# Patient Record
Sex: FEMALE | Race: WHITE | NOT HISPANIC OR LATINO | Employment: FULL TIME | URBAN - METROPOLITAN AREA
[De-identification: names, ages, dates, MRNs, and addresses within clinical notes are randomized per-mention and may not be internally consistent; named-entity substitution may affect disease eponyms.]

---

## 2019-04-11 ENCOUNTER — OFFICE VISIT (OUTPATIENT)
Dept: ORTHOPEDICS | Facility: CLINIC | Age: 49
End: 2019-04-11
Payer: MEDICAID

## 2019-04-11 VITALS — BODY MASS INDEX: 34.57 KG/M2 | TEMPERATURE: 99 F | HEIGHT: 63 IN | WEIGHT: 195.13 LBS

## 2019-04-11 DIAGNOSIS — S52.351A CLOSED DISPLACED COMMINUTED FRACTURE OF SHAFT OF RIGHT RADIUS, INITIAL ENCOUNTER: Primary | ICD-10-CM

## 2019-04-11 PROCEDURE — 99202 PR OFFICE/OUTPT VISIT, NEW, LEVL II, 15-29 MIN: ICD-10-PCS | Mod: S$GLB,,, | Performed by: ORTHOPAEDIC SURGERY

## 2019-04-11 PROCEDURE — 73090 PR  X-RAY FOREARM 2 VW: ICD-10-PCS | Mod: TC,RT,S$GLB, | Performed by: ORTHOPAEDIC SURGERY

## 2019-04-11 PROCEDURE — 73090 X-RAY EXAM OF FOREARM: CPT | Mod: TC,RT,S$GLB, | Performed by: ORTHOPAEDIC SURGERY

## 2019-04-11 PROCEDURE — 99202 OFFICE O/P NEW SF 15 MIN: CPT | Mod: S$GLB,,, | Performed by: ORTHOPAEDIC SURGERY

## 2019-04-11 RX ORDER — HYDROCODONE BITARTRATE AND ACETAMINOPHEN 10; 325 MG/1; MG/1
TABLET ORAL
COMMUNITY
Start: 2019-04-02 | End: 2021-01-28

## 2019-04-11 RX ORDER — GABAPENTIN 300 MG/1
300 CAPSULE ORAL 3 TIMES DAILY
COMMUNITY
Start: 2019-01-09 | End: 2023-07-05

## 2019-04-11 RX ORDER — TIZANIDINE 4 MG/1
TABLET ORAL
COMMUNITY
Start: 2019-01-10 | End: 2021-01-28

## 2019-04-11 RX ORDER — ESCITALOPRAM OXALATE 5 MG/1
TABLET ORAL
COMMUNITY
Start: 2019-01-09 | End: 2021-01-28

## 2019-04-11 RX ORDER — PROMETHAZINE HYDROCHLORIDE 6.25 MG/5ML
SYRUP ORAL
COMMUNITY
Start: 2019-03-28 | End: 2021-01-28

## 2019-04-11 RX ORDER — IBUPROFEN 800 MG/1
TABLET ORAL
COMMUNITY
Start: 2019-01-10 | End: 2021-01-28

## 2019-04-11 RX ORDER — DOXYCYCLINE 100 MG/1
CAPSULE ORAL
COMMUNITY
Start: 2019-03-28 | End: 2021-01-28

## 2019-04-11 NOTE — PROGRESS NOTES
Subjective:      Patient ID: Cynthia Raymond is a 48 y.o. female.    Chief Complaint: Arm Injury (rt)    HPI patient was thrown from a horse a week ago.  The horse then stepped on her right forearm.  Outside x-rays show a displaced midshaft right radius fracture    Review of Systems   Constitution: Negative for fever and weight loss.   Cardiovascular: Negative for chest pain and leg swelling.   Musculoskeletal: Positive for stiffness. Negative for arthritis, joint pain, joint swelling and muscle weakness.   Gastrointestinal: Negative for change in bowel habit.   Genitourinary: Negative for bladder incontinence and hematuria.   Neurological: Negative for focal weakness, numbness, paresthesias and sensory change.         Objective:            Ortho/SPM Exam            Assessment:       Encounter Diagnosis   Name Primary?    Closed displaced comminuted fracture of shaft of right radius, initial encounter Yes          Plan:       Cynthia was seen today for arm injury.    Diagnoses and all orders for this visit:    Closed displaced comminuted fracture of shaft of right radius, initial encounter     Repeat x-rays today show the fracture has displaced from the initial films which were taken outside.  Recommend open reduction internal fixation.  Complications alternatives wrist indications and benefits were discussed with the patient and she agrees to proceed

## 2019-04-17 ENCOUNTER — OUTSIDE PLACE OF SERVICE (OUTPATIENT)
Dept: ADMINISTRATIVE | Facility: OTHER | Age: 49
End: 2019-04-17

## 2019-04-22 RX ORDER — METHYLPREDNISOLONE 4 MG/1
TABLET ORAL
COMMUNITY
Start: 2019-03-28 | End: 2021-01-28

## 2019-04-23 ENCOUNTER — OFFICE VISIT (OUTPATIENT)
Dept: ORTHOPEDICS | Facility: CLINIC | Age: 49
End: 2019-04-23
Payer: MEDICAID

## 2019-04-23 DIAGNOSIS — S52.351A CLOSED DISPLACED COMMINUTED FRACTURE OF SHAFT OF RIGHT RADIUS, INITIAL ENCOUNTER: Primary | ICD-10-CM

## 2019-04-23 PROCEDURE — 73090 X-RAY EXAM OF FOREARM: CPT | Mod: TC,RT,S$GLB, | Performed by: ORTHOPAEDIC SURGERY

## 2019-04-23 PROCEDURE — 99024 POSTOP FOLLOW-UP VISIT: CPT | Mod: S$GLB,,, | Performed by: ORTHOPAEDIC SURGERY

## 2019-04-23 PROCEDURE — 99024 PR POST-OP FOLLOW-UP VISIT: ICD-10-PCS | Mod: S$GLB,,, | Performed by: ORTHOPAEDIC SURGERY

## 2019-04-23 PROCEDURE — 73090 PR  X-RAY FOREARM 2 VW: ICD-10-PCS | Mod: TC,RT,S$GLB, | Performed by: ORTHOPAEDIC SURGERY

## 2019-04-23 RX ORDER — OXYCODONE AND ACETAMINOPHEN 10; 325 MG/1; MG/1
TABLET ORAL
COMMUNITY
Start: 2019-04-17 | End: 2019-04-30 | Stop reason: DRUGHIGH

## 2019-04-23 NOTE — PROGRESS NOTES
Subjective:      Patient ID: Cynthia Raymond is a 48 y.o. female.    Chief Complaint: Post-op Evaluation of the Right Forearm    HPI patient is 1 week postop ORIF of her right radial shaft fracture  ROS    unchanged from prior visit  Objective:            Ortho/SPM Exam    The incision is clean.  There is minimal swelling. She has no drainage.  Active and passive range of motion of the elbow and wrist is normal        Assessment:       Encounter Diagnosis   Name Primary?    Closed displaced comminuted fracture of shaft of right radius, initial encounter Yes          Plan:       Cynthia was seen today for post-op evaluation.    Diagnoses and all orders for this visit:    Closed displaced comminuted fracture of shaft of right radius, initial encounter     X-ray shows anatomic reduction of the fracture with hardware in good position

## 2019-04-30 ENCOUNTER — OFFICE VISIT (OUTPATIENT)
Dept: ORTHOPEDICS | Facility: CLINIC | Age: 49
End: 2019-04-30
Payer: MEDICAID

## 2019-04-30 DIAGNOSIS — S52.351A CLOSED DISPLACED COMMINUTED FRACTURE OF SHAFT OF RIGHT RADIUS, INITIAL ENCOUNTER: Primary | ICD-10-CM

## 2019-04-30 PROCEDURE — 99024 POSTOP FOLLOW-UP VISIT: CPT | Mod: S$GLB,,, | Performed by: ORTHOPAEDIC SURGERY

## 2019-04-30 PROCEDURE — 99024 PR POST-OP FOLLOW-UP VISIT: ICD-10-PCS | Mod: S$GLB,,, | Performed by: ORTHOPAEDIC SURGERY

## 2019-04-30 RX ORDER — MUPIROCIN 20 MG/G
OINTMENT TOPICAL
COMMUNITY
Start: 2019-04-29 | End: 2021-01-28

## 2019-04-30 RX ORDER — SULFAMETHOXAZOLE AND TRIMETHOPRIM 800; 160 MG/1; MG/1
TABLET ORAL
COMMUNITY
Start: 2019-04-29 | End: 2021-01-28

## 2019-04-30 RX ORDER — OXYCODONE AND ACETAMINOPHEN 7.5; 325 MG/1; MG/1
1 TABLET ORAL EVERY 8 HOURS PRN
Qty: 24 TABLET | Refills: 0
Start: 2019-04-30 | End: 2019-06-04 | Stop reason: SDUPTHER

## 2019-04-30 NOTE — PROGRESS NOTES
Subjective:      Patient ID: Cynthia Ramyond is a 48 y.o. female.    Chief Complaint: Post-op Evaluation of the Right Forearm    HPI patient is 2 weeks postop ORIF of her right radial shaft fracture.  She still complains of moderate discomfort.    ROS unchanged from prior visit      Objective:            Ortho/SPM Exam  The incision is clean.  There is no drainage or significant swelling. She has 45° of extension at the wrist which is painful.  She has 45° of flexion at the wrist. She lacks 10° full extension at the elbow          Assessment:       Encounter Diagnosis   Name Primary?    Closed displaced comminuted fracture of shaft of right radius, initial encounter Yes          Plan:       Cynthia was seen today for post-op evaluation.    Diagnoses and all orders for this visit:    Closed displaced comminuted fracture of shaft of right radius, initial encounter     Staples are removed today.  She is placed in a volar splint which she can remove for active and passive range of motion of her elbow and wrist. Return 2 weeks for x-rays

## 2019-05-03 ENCOUNTER — HISTORICAL (OUTPATIENT)
Dept: ADMINISTRATIVE | Facility: HOSPITAL | Age: 49
End: 2019-05-03

## 2019-05-14 ENCOUNTER — OFFICE VISIT (OUTPATIENT)
Dept: ORTHOPEDICS | Facility: CLINIC | Age: 49
End: 2019-05-14
Payer: MEDICAID

## 2019-05-14 DIAGNOSIS — S52.351A CLOSED DISPLACED COMMINUTED FRACTURE OF SHAFT OF RIGHT RADIUS, INITIAL ENCOUNTER: Primary | ICD-10-CM

## 2019-05-14 PROCEDURE — 99024 POSTOP FOLLOW-UP VISIT: CPT | Mod: S$GLB,,, | Performed by: ORTHOPAEDIC SURGERY

## 2019-05-14 PROCEDURE — 99024 PR POST-OP FOLLOW-UP VISIT: ICD-10-PCS | Mod: S$GLB,,, | Performed by: ORTHOPAEDIC SURGERY

## 2019-05-14 RX ORDER — OXYCODONE AND ACETAMINOPHEN 5; 325 MG/1; MG/1
TABLET ORAL
COMMUNITY
Start: 2019-05-13 | End: 2019-06-04 | Stop reason: SDUPTHER

## 2019-05-14 RX ORDER — TRIPROLIDINE/PSEUDOEPHEDRINE 2.5MG-60MG
TABLET ORAL
COMMUNITY
Start: 2019-05-13 | End: 2021-01-28
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
## 2019-06-04 ENCOUNTER — OFFICE VISIT (OUTPATIENT)
Dept: ORTHOPEDICS | Facility: CLINIC | Age: 49
End: 2019-06-04
Payer: MEDICAID

## 2019-06-04 DIAGNOSIS — S52.351A CLOSED DISPLACED COMMINUTED FRACTURE OF SHAFT OF RIGHT RADIUS, INITIAL ENCOUNTER: Primary | ICD-10-CM

## 2019-06-04 PROCEDURE — 99024 POSTOP FOLLOW-UP VISIT: CPT | Mod: S$GLB,,, | Performed by: ORTHOPAEDIC SURGERY

## 2019-06-04 PROCEDURE — 99024 PR POST-OP FOLLOW-UP VISIT: ICD-10-PCS | Mod: S$GLB,,, | Performed by: ORTHOPAEDIC SURGERY

## 2019-06-04 PROCEDURE — 73090 PR  X-RAY FOREARM 2 VW: ICD-10-PCS | Mod: TC,RT,S$GLB, | Performed by: ORTHOPAEDIC SURGERY

## 2019-06-04 PROCEDURE — 73090 X-RAY EXAM OF FOREARM: CPT | Mod: TC,RT,S$GLB, | Performed by: ORTHOPAEDIC SURGERY

## 2019-06-04 RX ORDER — OXYCODONE AND ACETAMINOPHEN 5; 325 MG/1; MG/1
1 TABLET ORAL EVERY 8 HOURS PRN
Qty: 15 TABLET | Refills: 0
Start: 2019-06-04 | End: 2021-01-28

## 2019-06-04 NOTE — PROGRESS NOTES
Subjective:      Patient ID: Cynthia Raymond is a 48 y.o. female.    Chief Complaint: Post-op Evaluation of the Right Forearm    HPI patient is 6 weeks postop right radius fracture open reduction internal fixation.  She complains of occasional burning pain around the incision  ROS    unchanged from prior visit  Objective:            Ortho/SPM Exam  The incision is well-healed.  Active and passive range of motion the elbow and wrist is normal          Assessment:       Encounter Diagnosis   Name Primary?    Closed displaced comminuted fracture of shaft of right radius, initial encounter Yes          Plan:       Cynthia was seen today for post-op evaluation.    Diagnoses and all orders for this visit:    Closed displaced comminuted fracture of shaft of right radius, initial encounter     X-ray shows internal and external callus formation.  The fracture lines are still slightly visible  She will be seen back in 1 month for final x-rays.  She is given a prescription for Percocet 5/325.  Fifteen.  One 3 times a day p.r.n. pain

## 2019-07-11 ENCOUNTER — OFFICE VISIT (OUTPATIENT)
Dept: ORTHOPEDICS | Facility: CLINIC | Age: 49
End: 2019-07-11
Payer: MEDICAID

## 2019-07-11 DIAGNOSIS — S52.351A CLOSED DISPLACED COMMINUTED FRACTURE OF SHAFT OF RIGHT RADIUS, INITIAL ENCOUNTER: Primary | ICD-10-CM

## 2019-07-11 DIAGNOSIS — S52.351D CLOSED DISPLACED COMMINUTED FRACTURE OF SHAFT OF RIGHT RADIUS WITH ROUTINE HEALING, SUBSEQUENT ENCOUNTER: ICD-10-CM

## 2019-07-11 PROCEDURE — 99024 PR POST-OP FOLLOW-UP VISIT: ICD-10-PCS | Mod: S$GLB,,, | Performed by: ORTHOPAEDIC SURGERY

## 2019-07-11 PROCEDURE — 73090 PR  X-RAY FOREARM 2 VW: ICD-10-PCS | Mod: TC,RT,S$GLB, | Performed by: ORTHOPAEDIC SURGERY

## 2019-07-11 PROCEDURE — 99024 POSTOP FOLLOW-UP VISIT: CPT | Mod: S$GLB,,, | Performed by: ORTHOPAEDIC SURGERY

## 2019-07-11 PROCEDURE — 73090 X-RAY EXAM OF FOREARM: CPT | Mod: TC,RT,S$GLB, | Performed by: ORTHOPAEDIC SURGERY

## 2019-07-11 RX ORDER — HYDROCODONE BITARTRATE AND ACETAMINOPHEN 5; 325 MG/1; MG/1
TABLET ORAL
COMMUNITY
Start: 2019-07-08 | End: 2021-01-28

## 2019-07-11 RX ORDER — FLUOXETINE HYDROCHLORIDE 20 MG/1
CAPSULE ORAL
COMMUNITY
Start: 2019-07-08 | End: 2021-01-28

## 2019-07-11 RX ORDER — LORATADINE 10 MG/1
TABLET ORAL
COMMUNITY
Start: 2019-06-12 | End: 2021-01-28

## 2019-07-11 RX ORDER — OMEPRAZOLE 20 MG/1
20 CAPSULE, DELAYED RELEASE ORAL 2 TIMES DAILY
COMMUNITY
Start: 2019-05-14 | End: 2023-10-04 | Stop reason: SDUPTHER

## 2019-07-11 RX ORDER — PREDNISONE 10 MG/1
TABLET ORAL
COMMUNITY
Start: 2019-06-13 | End: 2021-01-28

## 2019-07-11 NOTE — PROGRESS NOTES
Subjective:      Patient ID: Cynthia Raymond is a 48 y.o. female.    Chief Complaint: No chief complaint on file.    HPI patient is here for follow-up for her right radial shaft fracture.  She has minimal complaints.    ROS    unchanged from prior visit  Objective:            Ortho/SPM Exam    Active and passive range of motion of the elbow and wrist is normal. She has no tenderness with palpation of the fracture site.        Assessment:       Encounter Diagnoses   Name Primary?    Closed displaced comminuted fracture of shaft of right radius, initial encounter Yes    Closed displaced comminuted fracture of shaft of right radius with routine healing, subsequent encounter           Plan:       Diagnoses and all orders for this visit:    Closed displaced comminuted fracture of shaft of right radius, initial encounter    Closed displaced comminuted fracture of shaft of right radius with routine healing, subsequent encounter     X-ray of the right forearm shows the fracture to be solidly healed.  Return p.r.n. she is given a work release for 07/19/2019

## 2019-10-29 ENCOUNTER — HISTORICAL (OUTPATIENT)
Dept: ADMINISTRATIVE | Facility: HOSPITAL | Age: 49
End: 2019-10-29

## 2020-01-17 ENCOUNTER — HISTORICAL (OUTPATIENT)
Dept: ADMINISTRATIVE | Facility: HOSPITAL | Age: 50
End: 2020-01-17

## 2020-01-18 ENCOUNTER — HISTORICAL (OUTPATIENT)
Dept: ADMINISTRATIVE | Facility: HOSPITAL | Age: 50
End: 2020-01-18

## 2020-01-20 ENCOUNTER — HISTORICAL (OUTPATIENT)
Dept: ADMINISTRATIVE | Facility: HOSPITAL | Age: 50
End: 2020-01-20

## 2020-12-29 ENCOUNTER — HISTORICAL (OUTPATIENT)
Dept: ADMINISTRATIVE | Facility: HOSPITAL | Age: 50
End: 2020-12-29

## 2021-01-19 DIAGNOSIS — M77.9 BONE SPUR: Primary | ICD-10-CM

## 2021-01-28 ENCOUNTER — OFFICE VISIT (OUTPATIENT)
Dept: ORTHOPEDICS | Facility: CLINIC | Age: 51
End: 2021-01-28
Payer: MEDICAID

## 2021-01-28 VITALS — TEMPERATURE: 99 F | BODY MASS INDEX: 33.93 KG/M2 | HEIGHT: 62 IN | WEIGHT: 184.38 LBS

## 2021-01-28 DIAGNOSIS — M77.52 RETROCALCANEAL BURSITIS (BACK OF HEEL), LEFT: Primary | ICD-10-CM

## 2021-01-28 DIAGNOSIS — M72.2 PLANTAR FASCIITIS OF LEFT FOOT: ICD-10-CM

## 2021-01-28 PROCEDURE — 99213 PR OFFICE/OUTPT VISIT, EST, LEVL III, 20-29 MIN: ICD-10-PCS | Mod: 25,S$GLB,, | Performed by: ORTHOPAEDIC SURGERY

## 2021-01-28 PROCEDURE — 20550 NJX 1 TENDON SHEATH/LIGAMENT: CPT | Mod: LT,S$GLB,, | Performed by: ORTHOPAEDIC SURGERY

## 2021-01-28 PROCEDURE — 20550 PR INJECT TENDON SHEATH/LIGAMENT: ICD-10-PCS | Mod: LT,S$GLB,, | Performed by: ORTHOPAEDIC SURGERY

## 2021-01-28 PROCEDURE — 99213 OFFICE O/P EST LOW 20 MIN: CPT | Mod: 25,S$GLB,, | Performed by: ORTHOPAEDIC SURGERY

## 2021-01-28 RX ORDER — LOSARTAN POTASSIUM 100 MG/1
TABLET ORAL
COMMUNITY
Start: 2021-01-05 | End: 2023-02-01 | Stop reason: DRUGHIGH

## 2021-01-28 RX ORDER — ATORVASTATIN CALCIUM 20 MG/1
20 TABLET, FILM COATED ORAL DAILY
COMMUNITY
Start: 2021-01-05 | End: 2023-09-01 | Stop reason: SDUPTHER

## 2021-01-28 RX ORDER — AMLODIPINE BESYLATE 5 MG/1
TABLET ORAL
COMMUNITY
Start: 2021-01-05 | End: 2023-02-01

## 2021-01-28 RX ORDER — DICLOFENAC SODIUM 75 MG/1
75 TABLET, DELAYED RELEASE ORAL 2 TIMES DAILY
COMMUNITY
Start: 2021-01-05 | End: 2023-07-13

## 2021-01-28 RX ORDER — IBUPROFEN 600 MG/1
600 TABLET ORAL EVERY 6 HOURS PRN
COMMUNITY
Start: 2020-12-29 | End: 2023-06-14 | Stop reason: SDUPTHER

## 2021-02-23 ENCOUNTER — HISTORICAL (OUTPATIENT)
Dept: ADMINISTRATIVE | Facility: HOSPITAL | Age: 51
End: 2021-02-23

## 2021-02-23 LAB
ANION GAP SERPL CALC-SCNC: 8 MMOL/L (ref 7–16)
ANISOCYTOSIS BLD QL SMEAR: NORMAL
BASOPHILS # BLD AUTO: 0.03 X10(3)/MCL (ref 0.01–0.08)
BASOPHILS NFR BLD AUTO: 0.3 % (ref 0.1–1.2)
BUN SERPL-MCNC: 13 MG/DL (ref 7–20)
CALCIUM SERPL-MCNC: 9.3 MG/DL (ref 8.4–10.2)
CHLORIDE SERPL-SCNC: 101 MMOL/L (ref 94–110)
CO2 SERPL-SCNC: 26 MMOL/L (ref 21–32)
CREAT SERPL-MCNC: 0.7 MG/DL (ref 0.52–1.04)
CREAT/UREA NIT SERPL: 18.6 (ref 12–20)
EOSINOPHIL # BLD AUTO: 0.2 X10(3)/MCL (ref 0.04–0.36)
EOSINOPHIL NFR BLD AUTO: 2.2 % (ref 0.7–7)
ERYTHROCYTE [DISTWIDTH] IN BLOOD BY AUTOMATED COUNT: 16.7 % (ref 11–14.5)
GLUCOSE SERPL-MCNC: 101 MGM./DL (ref 70–115)
HCT VFR BLD AUTO: 34.8 % (ref 36–48)
HGB BLD-MCNC: 10.5 G/DL (ref 11.8–16)
HYPOCHROMIA BLD QL SMEAR: NORMAL
IMM GRANULOCYTES # BLD AUTO: 0.02 X10E3/UL (ref 0–0.03)
IMM GRANULOCYTES NFR BLD AUTO: 0.2 % (ref 0–0.5)
LYMPHOCYTES # BLD AUTO: 1.84 X10(3)/MCL (ref 1.16–3.74)
LYMPHOCYTES NFR BLD AUTO: 19.9 % (ref 20–55)
MCH RBC QN AUTO: 22.2 PG (ref 27–34)
MCHC RBC AUTO-ENTMCNC: 30.2 G/DL (ref 31–37)
MCV RBC AUTO: 73.4 FL (ref 79–99)
MICROCYTES BLD QL SMEAR: NORMAL
MONOCYTES # BLD AUTO: 0.59 X10(3)/MCL (ref 0.24–0.36)
MONOCYTES NFR BLD AUTO: 6.4 % (ref 4.7–12.5)
NEUTROPHILS # BLD AUTO: 6.56 X10(3)/MCL (ref 1.56–6.13)
NEUTROPHILS NFR BLD AUTO: 71 % (ref 37–73)
PLATELET # BLD AUTO: 503 X10(3)/MCL (ref 140–371)
PMV BLD AUTO: 9.8 FL (ref 9.4–12.4)
POTASSIUM SERPL-SCNC: 4.2 MMOL/L (ref 3.5–5.1)
RBC # BLD AUTO: 4.74 X10(6)/MCL (ref 4–5.1)
RBC MORPH BLD: NORMAL
SODIUM SERPL-SCNC: 135 MMOL/L (ref 135–145)
WBC # SPEC AUTO: 9.2 X10(3)/MCL (ref 4–11.5)

## 2021-08-04 LAB
ALBUMIN SERPL-MCNC: 3.5 G/DL (ref 3.4–5)
ALBUMIN/GLOB SERPL: 1.3 {RATIO}
ALP SERPL-CCNC: 84 U/L (ref 50–144)
ALT SERPL-CCNC: 19 U/L (ref 1–45)
ANION GAP SERPL CALC-SCNC: 4 MMOL/L (ref 7–16)
AST SERPL-CCNC: 15 U/L (ref 14–36)
BASOPHILS # BLD AUTO: 0.08 X10(3)/MCL (ref 0.01–0.08)
BASOPHILS NFR BLD AUTO: 0.9 % (ref 0.1–1.2)
BILIRUB SERPL-MCNC: 0.23 MG/DL (ref 0.1–1)
BUN SERPL-MCNC: 17 MG/DL (ref 7–20)
CALCIUM SERPL-MCNC: 8.6 MG/DL (ref 8.4–10.2)
CHLORIDE SERPL-SCNC: 104 MMOL/L (ref 94–110)
CO2 SERPL-SCNC: 27 MMOL/L (ref 21–32)
CREAT SERPL-MCNC: 1.1 MG/DL (ref 0.52–1.04)
CREAT/UREA NIT SERPL: 15.5 (ref 12–20)
EOSINOPHIL # BLD AUTO: 0.33 X10(3)/MCL (ref 0.04–0.36)
EOSINOPHIL NFR BLD AUTO: 3.6 % (ref 0.7–7)
ERYTHROCYTE [DISTWIDTH] IN BLOOD BY AUTOMATED COUNT: 17 % (ref 11–14.5)
GLOBULIN SER-MCNC: 2.7 G/DL (ref 2–3.9)
GLUCOSE SERPL-MCNC: 83 MGM./DL (ref 70–115)
HCT VFR BLD AUTO: 30.9 % (ref 36–48)
HGB BLD-MCNC: 9.5 G/DL (ref 11.8–16)
IMM GRANULOCYTES # BLD AUTO: 0.06 X10E3/UL (ref 0–0.03)
IMM GRANULOCYTES NFR BLD AUTO: 0.7 % (ref 0–0.5)
LYMPHOCYTES # BLD AUTO: 3 X10(3)/MCL (ref 1.16–3.74)
LYMPHOCYTES NFR BLD AUTO: 33 % (ref 20–55)
MCH RBC QN AUTO: 21.9 PG (ref 27–34)
MCHC RBC AUTO-ENTMCNC: 30.7 G/DL (ref 31–37)
MCV RBC AUTO: 71.2 FL (ref 79–99)
MONOCYTES # BLD AUTO: 1.09 X10(3)/MCL (ref 0.24–0.36)
MONOCYTES NFR BLD AUTO: 12 % (ref 4.7–12.5)
NEUTROPHILS # BLD AUTO: 4.54 X10(3)/MCL (ref 1.56–6.13)
NEUTROPHILS NFR BLD AUTO: 49.8 % (ref 37–73)
PLATELET # BLD AUTO: 461 X10(3)/MCL (ref 140–371)
PMV BLD AUTO: 9.5 FL (ref 9.4–12.4)
POTASSIUM SERPL-SCNC: 4.2 MMOL/L (ref 3.5–5.1)
PROT SERPL-MCNC: 6.2 G/DL (ref 6.3–8.2)
RBC # BLD AUTO: 4.34 X10(6)/MCL (ref 4–5.1)
SODIUM SERPL-SCNC: 135 MMOL/L (ref 135–145)
WBC # SPEC AUTO: 9.1 X10(3)/MCL (ref 4–11.5)

## 2021-08-18 ENCOUNTER — HISTORICAL (OUTPATIENT)
Dept: ADMINISTRATIVE | Facility: HOSPITAL | Age: 51
End: 2021-08-18

## 2021-08-23 LAB
BILIRUB SERPL-MCNC: NEGATIVE MG/DL
BLOOD URINE, POC: NEGATIVE
CLARITY, POC UA: CLEAR
COLOR, POC UA: COLORLESS
GLUCOSE UR QL STRIP: NEGATIVE
HUMAN PAPILLOMAVIRUS (HPV): NORMAL
KETONES UR QL STRIP: NEGATIVE
LEUKOCYTE EST, POC UA: NORMAL
NITRITE, POC UA: NEGATIVE
PAP RECOMMENDATION EXT: NORMAL
PAP SMEAR: NORMAL
PH, POC UA: 6
PROTEIN, POC: NEGATIVE
SPECIFIC GRAVITY, POC UA: 1.02
TRICHOMONAS VAGINALIS: POSITIVE
UROBILINOGEN, POC UA: NORMAL

## 2021-08-30 ENCOUNTER — HISTORICAL (OUTPATIENT)
Dept: ADMINISTRATIVE | Facility: HOSPITAL | Age: 51
End: 2021-08-30

## 2021-11-03 LAB
BASOPHILS # BLD AUTO: 0.06 10*3/UL (ref 0.01–0.08)
BASOPHILS NFR BLD AUTO: 0.8 % (ref 0.1–1.2)
EOSINOPHIL # BLD AUTO: 0.11 10*3/UL (ref 0.04–0.36)
EOSINOPHIL NFR BLD AUTO: 1.4 % (ref 0.7–7)
ERYTHROCYTE [DISTWIDTH] IN BLOOD BY AUTOMATED COUNT: 16.9 % (ref 11–14.5)
FERRITIN SERPL-MCNC: 6.5 NG/ML (ref 6.2–264)
HCT VFR BLD AUTO: 38 % (ref 36–48)
HGB BLD-MCNC: 11.3 G/DL (ref 11.8–16)
IMM GRANULOCYTES # BLD AUTO: 0.09 10*3/UL (ref 0–0.03)
IMM GRANULOCYTES NFR BLD AUTO: 1.2 % (ref 0–0.5)
INFLUENZA A ANTIGEN, POC: POSITIVE
INFLUENZA B ANTIGEN, POC: NEGATIVE
IRON SATN MFR SERPL: 6.3 %
IRON SERPL-MCNC: 36 MCG/DL (ref 37–170)
LYMPHOCYTES # BLD AUTO: 1.46 10*3/UL (ref 1.16–3.74)
LYMPHOCYTES NFR BLD AUTO: 18.9 % (ref 20–55)
MCH RBC QN AUTO: 20.8 PG (ref 27–34)
MCHC RBC AUTO-ENTMCNC: 29.7 G/DL (ref 31–37)
MCV RBC AUTO: 70 FL (ref 79–99)
MONOCYTES # BLD AUTO: 0.72 10*3/UL (ref 0.24–0.36)
MONOCYTES NFR BLD AUTO: 9.3 % (ref 4.7–12.5)
NEUTROPHILS # BLD AUTO: 5.28 10*3/UL (ref 1.56–6.13)
NEUTROPHILS NFR BLD AUTO: 68.4 % (ref 37–73)
PLATELET # BLD AUTO: 541 10*3/UL (ref 140–371)
PMV BLD AUTO: 9.6 FL (ref 9.4–12.4)
RBC # BLD AUTO: 5.43 10*6/UL (ref 4–5.1)
SARS-COV-2 RNA RESP QL NAA+PROBE: NEGATIVE
TIBC SERPL-MCNC: 572 MCG/DL (ref 265–497)
WBC # SPEC AUTO: 7.7 10*3/UL (ref 4–11.5)

## 2021-12-23 LAB
INFLUENZA A ANTIGEN, POC: POSITIVE
INFLUENZA B ANTIGEN, POC: NEGATIVE

## 2022-03-21 ENCOUNTER — HISTORICAL (OUTPATIENT)
Dept: ADMINISTRATIVE | Facility: HOSPITAL | Age: 52
End: 2022-03-21

## 2022-04-11 ENCOUNTER — HISTORICAL (OUTPATIENT)
Dept: ADMINISTRATIVE | Facility: HOSPITAL | Age: 52
End: 2022-04-11
Payer: MEDICAID

## 2022-04-25 VITALS
HEIGHT: 63 IN | OXYGEN SATURATION: 98 % | DIASTOLIC BLOOD PRESSURE: 72 MMHG | SYSTOLIC BLOOD PRESSURE: 110 MMHG | BODY MASS INDEX: 35.08 KG/M2 | WEIGHT: 198 LBS

## 2022-05-05 NOTE — HISTORICAL [facility] CERNER
This is a historical note converted from [employer]. Formatting and pictures may have been removed.  Please reference [employer] for original formatting and attached multimedia. Chief Complaint  chest pain since FEb. 8th, SOB  History of Present Illness  50 WF PMH chronic pain, GERD, HLD, HTN, MISAEL, tobacco abuse?here with c/o anterior chest discomfort, it is constant since beginning around ~Feb 6th, worsened with deep breathing, has had dyspnea noted with any activity. She has cough with yellow productive sputum, wheezing occasionally,?and chest congestion. Symptoms persistent >2 weeks. and not getting better.??Also with sinus congestion, decreased appetite, generalized lack of energy, did have several days of nausea but that has now resolved; denies fever, chills, abdominal pain. Works in a public environment, was around people with a viral gastroenteritis last week. No COVID exposure that shes aware of. Went to [facility] on Feb. 8th, tested negative for COVID and for Influenza.  ?   BUN 13, creatinine 0.70; liver fx wnl. Mild anemia-chronic H&H 10.5/34.8  No hx clotting disorder, no recent surg, travel  ?  Review of Systems  Constitutional:?no fever; +fatigue  Eye:?no eye redness, drainage, or pain  ENMT:?no sore throat, ear pain, sinus pain/congestion, nasal congestion/drainage  Respiratory:?no cough, no wheezing, no shortness of breath  Cardiovascular:?no chest pain, no palpitations, no edema  Gastrointestinal:?no nausea, vomiting, or diarrhea. No abdominal pain  Genitourinary:?no dysuria, no urinary frequency or urgency  Musculoskeletal:?no muscle or joint pain, no joint swelling  Integumentary:?no skin rash or abnormal lesion  Neurologic: no headache, no dizziness  ?  Physical Exam  Vitals & Measurements  T:?36.3? ?C (Tympanic)? HR:?71(Peripheral)? BP:?114/70?  HT:?162.00?cm? WT:?84.100?kg? BMI:?32.05? LMP:?02/16/2021 00:00 CST?  General:?AAOx3, in no acute distress  Eye: PERRL, clear conjunctiva, eyelids  normal  HENT:?TMs/ear canals clear, oropharynx without erythema/exudate  Neck: no thyromegaly or lymphadenopathy  Respiratory:?clear to auscultation bilaterally  Cardiovascular:?regular rate and rhythm without murmurs  Gastrointestinal:?soft, non-tender, with normal bowel sounds?  Integumentary: no rashes or skin lesions present, no peripheral edema  Musculoskeletal: steady gait  ?  ?EKG= sinus miroslava, rate 58 bpm; non-specific Twave abnormality  CXR=no actue finding  Assessment/Plan  1.?Lower respiratory infection ? J22  Albuterol INH  Zpack  Medrol dose pack  Guaifenesin  2.?Chest pain, pleuritic ? R07.81  Ibuprofen 600mg Q8 hours  3.?Tobacco abuse ? Z72.0  ?Encouraged smoking cessation   Problem List/Past Medical History  Ongoing  Chronic pain  Gastroesophageal reflux disease  Hypertensive disorder  Mixed hyperlipidemia  Obesity  Restless legs  Tobacco abuse  Historical  No qualifying data  Procedure/Surgical History  Cholecystectomy (2020)  Caesarean Section   section  ORIF - Open reduction and internal fixation of fracture  Tubal Ligation  Unlisted procedure, forearm or wrist   Medications  amLODIPine 5 mg oral tablet, 5 mg= 1 tab(s), Oral, Daily, 2 refills  atorvastatin 20 mg oral tablet, 20 mg= 1 tab(s), Oral, Daily, 2 refills  Azithromycin 5 Day Dose Pack 250 mg oral tablet, 1 packet(s), Oral, Daily  gabapentin 300 mg oral capsule, 300 mg= 1 cap(s), Oral, TID, 4 refills  guaifenesin 600 mg oral tablet, extended release, 600 mg= 1 tab(s), Oral, q12hr  ibuprofen 600 mg oral tablet, 600 mg= 1 tab(s), Oral, q8hr,? ?Still taking, not as prescribed  losartan 100 mg oral tablet, 100 mg= 1 tab(s), Oral, Daily, 2 refills  omeprazole 20 mg oral DR capsule, 20 mg= 1 cap(s), Oral, Daily, PRN, 2 refills  Ventolin HFA 90 mcg/inh inhalation aerosol, 2 puff(s), INH, q6hr, PRN  Allergies  traMADol?(Itching)  Social History  Abuse/Neglect  No, 2020  Tobacco  10 or more cigarettes (1/2 pack or more)/day in  last 30 days, Yes, 02/23/2021  Family History  Cardiac arrhythmia.: Father.  Depression.: Sister.  Diabetes mellitus type 2: Mother.  Hyperlipidemia.: Mother and Father.  Hypertension.: Mother.  Primary malignant neoplasm of prostate: Father.  Immunizations  Vaccine Date Status   influenza virus vaccine, inactivated 12/23/2020 Given   tetanus/diphtheria/pertussis, acel(Tdap) 11/11/2020 Recorded   influenza virus vaccine, inactivated 10/10/2017 Recorded   pneumococcal 13-valent conjugate vaccine 06/13/2017 Recorded

## 2022-09-05 ENCOUNTER — HISTORICAL (OUTPATIENT)
Dept: ADMINISTRATIVE | Facility: HOSPITAL | Age: 52
End: 2022-09-05

## 2022-09-21 ENCOUNTER — HISTORICAL (OUTPATIENT)
Dept: ADMINISTRATIVE | Facility: HOSPITAL | Age: 52
End: 2022-09-21
Payer: MEDICAID

## 2022-09-25 ENCOUNTER — HISTORICAL (OUTPATIENT)
Dept: ADMINISTRATIVE | Facility: HOSPITAL | Age: 52
End: 2022-09-25

## 2023-01-20 ENCOUNTER — DOCUMENTATION ONLY (OUTPATIENT)
Dept: ADMINISTRATIVE | Facility: HOSPITAL | Age: 53
End: 2023-01-20
Payer: MEDICAID

## 2023-02-01 ENCOUNTER — OFFICE VISIT (OUTPATIENT)
Dept: FAMILY MEDICINE | Facility: CLINIC | Age: 53
End: 2023-02-01
Payer: MEDICAID

## 2023-02-01 VITALS
SYSTOLIC BLOOD PRESSURE: 138 MMHG | BODY MASS INDEX: 33.17 KG/M2 | DIASTOLIC BLOOD PRESSURE: 82 MMHG | WEIGHT: 187.19 LBS | HEIGHT: 63 IN | OXYGEN SATURATION: 99 % | TEMPERATURE: 98 F

## 2023-02-01 DIAGNOSIS — S46.811A STRAIN OF RIGHT TRAPEZIUS MUSCLE, INITIAL ENCOUNTER: Primary | ICD-10-CM

## 2023-02-01 DIAGNOSIS — M54.2 NECK PAIN: ICD-10-CM

## 2023-02-01 DIAGNOSIS — Z23 IMMUNIZATION DUE: ICD-10-CM

## 2023-02-01 PROBLEM — E78.2 MIXED HYPERLIPIDEMIA: Status: ACTIVE | Noted: 2019-11-20

## 2023-02-01 PROBLEM — E66.9 OBESITY: Status: ACTIVE | Noted: 2023-02-01

## 2023-02-01 PROBLEM — G25.81 RESTLESS LEGS SYNDROME: Status: ACTIVE | Noted: 2019-11-18

## 2023-02-01 PROBLEM — I10 HYPERTENSION: Status: ACTIVE | Noted: 2019-11-18

## 2023-02-01 PROBLEM — Z72.0 TOBACCO USER: Status: ACTIVE | Noted: 2023-02-01

## 2023-02-01 PROBLEM — Z80.0 FAMILY HISTORY OF MALIGNANT NEOPLASM OF COLON: Status: ACTIVE | Noted: 2023-02-01

## 2023-02-01 PROBLEM — G89.29 OTHER CHRONIC PAIN: Status: ACTIVE | Noted: 2019-11-18

## 2023-02-01 PROBLEM — K21.9 GASTROESOPHAGEAL REFLUX DISEASE: Status: ACTIVE | Noted: 2019-11-18

## 2023-02-01 PROBLEM — D50.9 IRON DEFICIENCY ANEMIA: Status: ACTIVE | Noted: 2023-02-01

## 2023-02-01 PROBLEM — J32.9 CHRONIC RHINOSINUSITIS: Status: ACTIVE | Noted: 2023-02-01

## 2023-02-01 PROBLEM — G43.909 MIGRAINE HEADACHE: Status: ACTIVE | Noted: 2023-02-01

## 2023-02-01 PROCEDURE — 1159F MED LIST DOCD IN RCRD: CPT | Mod: CPTII,,, | Performed by: NURSE PRACTITIONER

## 2023-02-01 PROCEDURE — 90750 HZV VACC RECOMBINANT IM: CPT | Mod: ,,, | Performed by: NURSE PRACTITIONER

## 2023-02-01 PROCEDURE — 3075F PR MOST RECENT SYSTOLIC BLOOD PRESS GE 130-139MM HG: ICD-10-PCS | Mod: CPTII,,, | Performed by: NURSE PRACTITIONER

## 2023-02-01 PROCEDURE — 99213 PR OFFICE/OUTPT VISIT, EST, LEVL III, 20-29 MIN: ICD-10-PCS | Mod: 25,,, | Performed by: NURSE PRACTITIONER

## 2023-02-01 PROCEDURE — 3008F BODY MASS INDEX DOCD: CPT | Mod: CPTII,,, | Performed by: NURSE PRACTITIONER

## 2023-02-01 PROCEDURE — 90472 ZOSTER RECOMBINANT VACCINE: ICD-10-PCS | Mod: ,,, | Performed by: NURSE PRACTITIONER

## 2023-02-01 PROCEDURE — 3079F PR MOST RECENT DIASTOLIC BLOOD PRESSURE 80-89 MM HG: ICD-10-PCS | Mod: CPTII,,, | Performed by: NURSE PRACTITIONER

## 2023-02-01 PROCEDURE — 1160F RVW MEDS BY RX/DR IN RCRD: CPT | Mod: CPTII,,, | Performed by: NURSE PRACTITIONER

## 2023-02-01 PROCEDURE — 3075F SYST BP GE 130 - 139MM HG: CPT | Mod: CPTII,,, | Performed by: NURSE PRACTITIONER

## 2023-02-01 PROCEDURE — 90472 IMMUNIZATION ADMIN EACH ADD: CPT | Mod: ,,, | Performed by: NURSE PRACTITIONER

## 2023-02-01 PROCEDURE — 99213 OFFICE O/P EST LOW 20 MIN: CPT | Mod: 25,,, | Performed by: NURSE PRACTITIONER

## 2023-02-01 PROCEDURE — 1159F PR MEDICATION LIST DOCUMENTED IN MEDICAL RECORD: ICD-10-PCS | Mod: CPTII,,, | Performed by: NURSE PRACTITIONER

## 2023-02-01 PROCEDURE — 1160F PR REVIEW ALL MEDS BY PRESCRIBER/CLIN PHARMACIST DOCUMENTED: ICD-10-PCS | Mod: CPTII,,, | Performed by: NURSE PRACTITIONER

## 2023-02-01 PROCEDURE — 90750 ZOSTER RECOMBINANT VACCINE: ICD-10-PCS | Mod: ,,, | Performed by: NURSE PRACTITIONER

## 2023-02-01 PROCEDURE — 3008F PR BODY MASS INDEX (BMI) DOCUMENTED: ICD-10-PCS | Mod: CPTII,,, | Performed by: NURSE PRACTITIONER

## 2023-02-01 PROCEDURE — 4010F PR ACE/ARB THEARPY RXD/TAKEN: ICD-10-PCS | Mod: CPTII,,, | Performed by: NURSE PRACTITIONER

## 2023-02-01 PROCEDURE — 4010F ACE/ARB THERAPY RXD/TAKEN: CPT | Mod: CPTII,,, | Performed by: NURSE PRACTITIONER

## 2023-02-01 PROCEDURE — 3079F DIAST BP 80-89 MM HG: CPT | Mod: CPTII,,, | Performed by: NURSE PRACTITIONER

## 2023-02-01 PROCEDURE — 90686 FLU VACCINE (QUAD) GREATER THAN OR EQUAL TO 3YO PRESERVATIVE FREE IM: ICD-10-PCS | Mod: ,,, | Performed by: NURSE PRACTITIONER

## 2023-02-01 PROCEDURE — 90686 IIV4 VACC NO PRSV 0.5 ML IM: CPT | Mod: ,,, | Performed by: NURSE PRACTITIONER

## 2023-02-01 PROCEDURE — 90471 FLU VACCINE (QUAD) GREATER THAN OR EQUAL TO 3YO PRESERVATIVE FREE IM: ICD-10-PCS | Mod: ,,, | Performed by: NURSE PRACTITIONER

## 2023-02-01 PROCEDURE — 90471 IMMUNIZATION ADMIN: CPT | Mod: ,,, | Performed by: NURSE PRACTITIONER

## 2023-02-01 RX ORDER — FLUTICASONE PROPIONATE 50 MCG
1 SPRAY, SUSPENSION (ML) NASAL 2 TIMES DAILY
COMMUNITY
Start: 2022-09-07

## 2023-02-01 RX ORDER — ACETAMINOPHEN AND CODEINE PHOSPHATE 300; 30 MG/1; MG/1
1 TABLET ORAL EVERY 6 HOURS PRN
COMMUNITY
Start: 2023-01-31 | End: 2023-06-01

## 2023-02-01 RX ORDER — ALBUTEROL SULFATE 90 UG/1
2 AEROSOL, METERED RESPIRATORY (INHALATION) EVERY 6 HOURS PRN
COMMUNITY
Start: 2022-09-07 | End: 2024-03-18 | Stop reason: SDUPTHER

## 2023-02-01 RX ORDER — TIZANIDINE 4 MG/1
4 TABLET ORAL EVERY 8 HOURS PRN
Qty: 20 TABLET | Refills: 0 | Status: SHIPPED | OUTPATIENT
Start: 2023-02-01 | End: 2023-02-11

## 2023-02-01 RX ORDER — HYDROCHLOROTHIAZIDE 25 MG/1
12.5 TABLET ORAL DAILY
COMMUNITY
Start: 2023-01-24 | End: 2023-07-13

## 2023-02-01 RX ORDER — LOSARTAN POTASSIUM 25 MG/1
25 TABLET ORAL DAILY
COMMUNITY
Start: 2023-01-24 | End: 2023-06-08

## 2023-02-01 NOTE — PROGRESS NOTES
"Patient ID: Cynthia Raymond  MRN: [id number]    Chief Complaint: Neck Pain (Throbbing sharp pain on right side of neck few months )    Allergies: Patient is allergic to tramadol.     Social History:  reports that she has been smoking cigarettes. She has been smoking an average of .5 packs per day. She does not have any smokeless tobacco history on file. She reports that she does not currently use alcohol. She reports that she does not currently use drugs after having used the following drugs: "Crack" cocaine, Marijuana, and Methamphetamines.    Problem List:  Patient Active Problem List   Diagnosis    Other chronic pain    Chronic rhinosinusitis    Family history of malignant neoplasm of colon    Gastroesophageal reflux disease    Hypertension    Iron deficiency anemia    Migraine headache    Mixed hyperlipidemia    Obesity    Restless legs syndrome    Tobacco user      Current Medications:  Current Outpatient Medications   Medication Instructions    acetaminophen-codeine 300-30mg (TYLENOL #3) 300-30 mg Tab 1 tablet, Oral, Every 6 hours PRN    albuterol (PROVENTIL/VENTOLIN HFA) 90 mcg/actuation inhaler 2 puffs, Inhalation, Every 6 hours PRN    atorvastatin (LIPITOR) 20 mg, Oral, Daily    diclofenac (VOLTAREN) 75 mg, Oral, 2 times daily    fluticasone propionate (FLONASE) 50 mcg/actuation nasal spray 1 spray, Each Nostril, 2 times daily    gabapentin (NEURONTIN) 300 mg, Oral, 3 times daily    hydroCHLOROthiazide (HYDRODIURIL) 25 mg, Oral, Daily     mg, Oral, Every 6 hours PRN    losartan (COZAAR) 25 mg, Oral, Daily    omeprazole (PRILOSEC) 20 mg, 2 times daily     History of Present Illness:  The patient is a 52 y.o. White female who presents to clinic for evaluation and management with a chief complaint of Neck Pain (Throbbing sharp pain on right side of neck few months )     Patient reports neck pain began months. Denies any fall or injury. Pain is always located on right side of neck. Denies any radiation " "down arm , back or into ear. No improvement with NSAIDs. Has chronic recurring headaches. Patient is established with ortho for left thumb contracture/weakness.     Neck Pain   This is a recurrent problem. The current episode started more than 1 month ago. The problem occurs constantly. The pain is present in the right side. The quality of the pain is described as aching and stabbing. The pain is at a severity of 5/10. The pain is moderate. The pain is Same all the time. Pertinent negatives include no chest pain, fever, headaches, numbness, pain with swallowing, photophobia, syncope, tingling, trouble swallowing, visual change, weakness or weight loss. She has tried acetaminophen, NSAIDs and oral narcotics for the symptoms.      Review of Systems   Constitutional:  Negative for activity change, appetite change, fever and weight loss.   HENT:  Negative for nasal congestion, rhinorrhea, sore throat and trouble swallowing.    Eyes:  Negative for photophobia.   Cardiovascular:  Negative for chest pain and syncope.   Gastrointestinal:  Negative for abdominal pain, constipation and diarrhea.   Musculoskeletal:  Positive for arthralgias, neck pain, neck stiffness and joint deformity.   Neurological:  Negative for tingling, weakness, numbness and headaches.      Visit Vitals  /82 (BP Location: Right arm)   Temp 98.4 °F (36.9 °C) (Temporal)   Ht 5' 2.99" (1.6 m)   Wt 84.9 kg (187 lb 2.7 oz)   LMP 01/28/2023 (Exact Date)   SpO2 99%   BMI 33.17 kg/m²       Physical Exam  Vitals reviewed.   Constitutional:       General: She is not in acute distress.     Appearance: Normal appearance.   HENT:      Head: Normocephalic and atraumatic.      Right Ear: Tympanic membrane, ear canal and external ear normal.      Left Ear: Tympanic membrane, ear canal and external ear normal.      Nose: Nose normal. No congestion.      Mouth/Throat:      Mouth: Mucous membranes are moist.      Pharynx: Oropharynx is clear. No oropharyngeal " exudate or posterior oropharyngeal erythema.   Eyes:      Extraocular Movements: Extraocular movements intact.      Conjunctiva/sclera: Conjunctivae normal.      Pupils: Pupils are equal, round, and reactive to light.   Cardiovascular:      Rate and Rhythm: Normal rate and regular rhythm.      Pulses: Normal pulses.   Pulmonary:      Effort: Pulmonary effort is normal.      Breath sounds: Normal breath sounds.   Musculoskeletal:         General: Deformity (left thumb contracture) present. No swelling.      Cervical back: Normal range of motion and neck supple. Tenderness (right trapezius, negative spurling, no tenderness over cervical spine) present.   Lymphadenopathy:      Cervical: No cervical adenopathy.   Skin:     General: Skin is warm and dry.      Capillary Refill: Capillary refill takes less than 2 seconds.      Coloration: Skin is not jaundiced.      Findings: No rash.   Neurological:      Mental Status: She is alert and oriented to person, place, and time.      Cranial Nerves: No cranial nerve deficit.   Psychiatric:         Mood and Affect: Mood normal.         Behavior: Behavior normal.        Assessment & Plan:  1. Strain of right trapezius muscle, initial encounter    2. Neck pain    3. Immunization due  Comments:  flu and shingles given, f/u in 2-6 months for 2nd shingles  Orders:  -     Influenza - Quadrivalent *Preferred* (6 months+) (PF)  -     (In Office Administered) Zoster Recombinant Vaccine    Other orders  -     tiZANidine (ZANAFLEX) 4 MG tablet; Take 1 tablet (4 mg total) by mouth every 8 (eight) hours as needed (muscle pain).  Dispense: 20 tablet; Refill: 0    Educated on need to avoid taking more than one NSAID at a time. Educated that muscle relaxants can cause drowsiness. Patient states understanding. If no improvement, Will order xray of cervical spine. Handouts for stretching exercises provided.       Future Appointments   Date Time Provider Department Center   8/24/2023  8:45 AM LAB,  [facility] LABORATORY DRAW STATION [facility] SAHRA Hussein   8/29/2023 10:00 AM GENOVEVA Rodriguez [facility] JOHN Hussein       Follow up if symptoms worsen or fail to improve. Call sooner if needed.    TRINIDAD CHARLES

## 2023-02-06 ENCOUNTER — HOSPITAL ENCOUNTER (EMERGENCY)
Facility: HOSPITAL | Age: 53
Discharge: HOME | End: 2023-02-06
Payer: MEDICAID

## 2023-02-06 VITALS
BODY MASS INDEX: 32.78 KG/M2 | RESPIRATION RATE: 20 BRPM | HEIGHT: 63 IN | HEART RATE: 89 BPM | TEMPERATURE: 99 F | OXYGEN SATURATION: 97 % | SYSTOLIC BLOOD PRESSURE: 109 MMHG | DIASTOLIC BLOOD PRESSURE: 70 MMHG | WEIGHT: 185 LBS

## 2023-02-06 DIAGNOSIS — B34.9 VIRAL ILLNESS: Primary | ICD-10-CM

## 2023-02-06 LAB
INFLUENZA A (OHS): NEGATIVE
INFLUENZA B (OHS): NEGATIVE
SARS-COV-2 RDRP RESP QL NAA+PROBE: NEGATIVE

## 2023-02-06 PROCEDURE — 99283 EMERGENCY DEPT VISIT LOW MDM: CPT

## 2023-02-06 PROCEDURE — 87635 SARS-COV-2 COVID-19 AMP PRB: CPT | Performed by: NURSE PRACTITIONER

## 2023-02-06 PROCEDURE — 87400 INFLUENZA A/B EACH AG IA: CPT | Performed by: NURSE PRACTITIONER

## 2023-02-06 PROCEDURE — 25000003 PHARM REV CODE 250: Performed by: NURSE PRACTITIONER

## 2023-02-06 RX ORDER — IBUPROFEN 600 MG/1
600 TABLET ORAL
Status: COMPLETED | OUTPATIENT
Start: 2023-02-06 | End: 2023-02-06

## 2023-02-06 RX ADMIN — IBUPROFEN 600 MG: 600 TABLET, FILM COATED ORAL at 12:02

## 2023-02-06 NOTE — Clinical Note
"Cynthia Alanis" Segundo was seen and treated in our emergency department on 2/6/2023.  She may return to work on 02/08/2023.       If you have any questions or concerns, please don't hesitate to call.      MARISOL Gaming"

## 2023-02-06 NOTE — ED PROVIDER NOTES
"Encounter Date: 2023       History     Chief Complaint   Patient presents with    Diarrhea    Fever    Headache     Pt ambulatory with c/o dry cough, headache, fever and body aches, onset last evening.  Pt coughing while in triage.  Has been taking otc meds.     C/o fever, cough, headache and body aches x 2 days    Review of patient's allergies indicates:   Allergen Reactions    Tramadol Itching     Past Medical History:   Diagnosis Date    GERD (gastroesophageal reflux disease)     Hypercholesteremia     Hypertension      Past Surgical History:   Procedure Laterality Date     SECTION      SHOULDER ARTHROSCOPY      SHOULDER SURGERY       Family History   Problem Relation Age of Onset    Hypertension Father     Heart disease Father      Social History     Tobacco Use    Smoking status: Every Day     Packs/day: 0.50     Types: Cigarettes   Substance Use Topics    Alcohol use: Not Currently    Drug use: Not Currently     Types: "Crack" cocaine, Marijuana, Methamphetamines     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        Body aches   Respiratory:  Positive for cough.    Neurological:  Positive for headaches.   All other systems reviewed and are negative.    Physical Exam     Initial Vitals [23 1210]   BP Pulse Resp Temp SpO2   117/79 103 (!) 24 (!) 100.5 °F (38.1 °C) 98 %      MAP       --         Physical Exam    Nursing note and vitals reviewed.  Constitutional: She appears well-developed and well-nourished.   HENT:   Head: Normocephalic and atraumatic.   Eyes: Conjunctivae and EOM are normal. Pupils are equal, round, and reactive to light.   Neck: Neck supple.   Normal range of motion.  Cardiovascular:  Normal rate, regular rhythm, normal heart sounds and intact distal pulses.           Pulmonary/Chest: Breath sounds normal.   Musculoskeletal:         General: Normal range of motion.      Cervical back: Normal range of motion and neck supple. "     Neurological: She is alert and oriented to person, place, and time. She has normal strength.   Skin: Skin is warm and dry. Capillary refill takes less than 2 seconds.   Psychiatric: She has a normal mood and affect. Her behavior is normal. Judgment and thought content normal.       ED Course   Procedures  Labs Reviewed   RAPID INFLUENZA A/B - Normal   SARS-COV-2 RNA AMPLIFICATION, QUAL - Normal          Imaging Results    None          Medications   ibuprofen tablet 600 mg (600 mg Oral Given 2/6/23 1244)                              Clinical Impression:   Final diagnoses:  [B34.9] Viral illness (Primary)        ED Disposition Condition    Discharge Stable          ED Prescriptions    None       Follow-up Information       Follow up With Specialties Details Why Contact Info    [facility]  As needed [address]  [address]  [facility] [place] [facility] [address]  [phone]               MARISOL Gaming  02/06/23 1919

## 2023-02-07 ENCOUNTER — TELEPHONE (OUTPATIENT)
Dept: FAMILY MEDICINE | Facility: CLINIC | Age: 53
End: 2023-02-07
Payer: MEDICAID

## 2023-02-07 DIAGNOSIS — B34.9 VIRAL ILLNESS: Primary | ICD-10-CM

## 2023-02-07 RX ORDER — BENZONATATE 200 MG/1
200 CAPSULE ORAL 3 TIMES DAILY PRN
Qty: 30 CAPSULE | Refills: 0 | Status: SHIPPED | OUTPATIENT
Start: 2023-02-07 | End: 2023-02-17

## 2023-02-07 NOTE — TELEPHONE ENCOUNTER
----- Message from Kelly Ibarra MA sent at 2/7/2023  3:14 PM CST -----  Regarding: RE: Call Back  I called and spoke to pt. She has been sick 2 days and went to ER yesterday. I informed her to continue to take the OTC meds and drink plenty of fluids and if not better in a couple days to call us back. Her boyfriend has the flu. She also informed me the only medication that helped her with the cough was cough syrup with  codeine .  ----- Message -----  From: Orlando Menendez  Sent: 2/7/2023   2:59 PM CST  To: Susie GAVIRIA Staff  Subject: Call Back                                        Pt went to the er/miguel, virual influenza, pt was told to take over the counter meds and pt feels like crap, not eating but drinking can not sleep. Pt would like an appt.    [facility]    [phone]

## 2023-02-07 NOTE — TELEPHONE ENCOUNTER
I agree with what she has already been told; take OTC medications and drink plenty of fluids. I would be willing to send Tessalon pearls but will not send a controlled medication for cough.

## 2023-05-04 ENCOUNTER — OFFICE VISIT (OUTPATIENT)
Dept: FAMILY MEDICINE | Facility: CLINIC | Age: 53
End: 2023-05-04
Payer: MEDICAID

## 2023-05-04 VITALS
WEIGHT: 194.63 LBS | TEMPERATURE: 98 F | HEART RATE: 70 BPM | DIASTOLIC BLOOD PRESSURE: 64 MMHG | BODY MASS INDEX: 34.48 KG/M2 | SYSTOLIC BLOOD PRESSURE: 92 MMHG | HEIGHT: 63 IN | OXYGEN SATURATION: 99 %

## 2023-05-04 DIAGNOSIS — M66.842 NONTRAUMATIC RUPTURE OF TENDON OF LEFT THUMB: Primary | ICD-10-CM

## 2023-05-04 PROCEDURE — 3078F DIAST BP <80 MM HG: CPT | Mod: CPTII,,, | Performed by: NURSE PRACTITIONER

## 2023-05-04 PROCEDURE — 4010F PR ACE/ARB THEARPY RXD/TAKEN: ICD-10-PCS | Mod: CPTII,,, | Performed by: NURSE PRACTITIONER

## 2023-05-04 PROCEDURE — 1159F MED LIST DOCD IN RCRD: CPT | Mod: CPTII,,, | Performed by: NURSE PRACTITIONER

## 2023-05-04 PROCEDURE — 99213 PR OFFICE/OUTPT VISIT, EST, LEVL III, 20-29 MIN: ICD-10-PCS | Mod: ,,, | Performed by: NURSE PRACTITIONER

## 2023-05-04 PROCEDURE — 99213 OFFICE O/P EST LOW 20 MIN: CPT | Mod: ,,, | Performed by: NURSE PRACTITIONER

## 2023-05-04 PROCEDURE — 3008F PR BODY MASS INDEX (BMI) DOCUMENTED: ICD-10-PCS | Mod: CPTII,,, | Performed by: NURSE PRACTITIONER

## 2023-05-04 PROCEDURE — 1160F RVW MEDS BY RX/DR IN RCRD: CPT | Mod: CPTII,,, | Performed by: NURSE PRACTITIONER

## 2023-05-04 PROCEDURE — 4010F ACE/ARB THERAPY RXD/TAKEN: CPT | Mod: CPTII,,, | Performed by: NURSE PRACTITIONER

## 2023-05-04 PROCEDURE — 1160F PR REVIEW ALL MEDS BY PRESCRIBER/CLIN PHARMACIST DOCUMENTED: ICD-10-PCS | Mod: CPTII,,, | Performed by: NURSE PRACTITIONER

## 2023-05-04 PROCEDURE — 3074F SYST BP LT 130 MM HG: CPT | Mod: CPTII,,, | Performed by: NURSE PRACTITIONER

## 2023-05-04 PROCEDURE — 3078F PR MOST RECENT DIASTOLIC BLOOD PRESSURE < 80 MM HG: ICD-10-PCS | Mod: CPTII,,, | Performed by: NURSE PRACTITIONER

## 2023-05-04 PROCEDURE — 3074F PR MOST RECENT SYSTOLIC BLOOD PRESSURE < 130 MM HG: ICD-10-PCS | Mod: CPTII,,, | Performed by: NURSE PRACTITIONER

## 2023-05-04 PROCEDURE — 1159F PR MEDICATION LIST DOCUMENTED IN MEDICAL RECORD: ICD-10-PCS | Mod: CPTII,,, | Performed by: NURSE PRACTITIONER

## 2023-05-04 PROCEDURE — 3008F BODY MASS INDEX DOCD: CPT | Mod: CPTII,,, | Performed by: NURSE PRACTITIONER

## 2023-05-04 RX ORDER — SUMATRIPTAN SUCCINATE 25 MG/1
25 TABLET ORAL DAILY PRN
COMMUNITY
Start: 2022-06-08 | End: 2023-09-12 | Stop reason: SDUPTHER

## 2023-05-04 NOTE — PROGRESS NOTES
"Patient ID: Cynthia Raymond  : 1970    Chief Complaint: Hand Pain (Would like a referral to ortho)    Allergies: Patient is allergic to tramadol.     History of Present Illness:  The patient is a 52 y.o. White female who presents to clinic for follow up on Hand Pain (Would like a referral to ortho)     She began with spontaneous left thumb pain last year and ended up at  who referred her to an Orthopedic surgeon in [place]; he did nerve conduction test and told her that she has some degree of Carpal tunnel and some neuropathy of the hand. She was eventually referred to a Ortho in [facility] and was told to have an EPL tendon rupture. She did a MRI yesterday in [place]. She does not have any function of the left thumb at this point in time. She is right hand dominant.    She is requesting a referral to an Orthopedic doctor that is located closer to home as it is difficult to get to [facility].        Social History:  reports that she has been smoking cigarettes. She has a 10.00 pack-year smoking history. She does not have any smokeless tobacco history on file. She reports that she does not currently use alcohol. She reports that she does not currently use drugs after having used the following drugs: "Crack" cocaine, Hydrocodone, Marijuana, and Methamphetamines.    Past Medical History:  has a past medical history of GERD (gastroesophageal reflux disease), Hypercholesteremia, Hypertension, MISAEL (iron deficiency anemia), Migraine headache, and RLS (restless legs syndrome).    Current Medications:  Current Outpatient Medications   Medication Instructions    acetaminophen-codeine 300-30mg (TYLENOL #3) 300-30 mg Tab 1 tablet, Oral, Every 6 hours PRN    albuterol (PROVENTIL/VENTOLIN HFA) 90 mcg/actuation inhaler 2 puffs, Inhalation, Every 6 hours PRN    atorvastatin (LIPITOR) 20 mg, Oral, Daily    diclofenac (VOLTAREN) 75 mg, Oral, 2 times daily    fluticasone propionate (FLONASE) 50 mcg/actuation nasal spray " "1 spray, Each Nostril, 2 times daily    gabapentin (NEURONTIN) 300 mg, Oral, 3 times daily    hydroCHLOROthiazide (HYDRODIURIL) 25 mg, Oral, Daily     mg, Oral, Every 6 hours PRN    losartan (COZAAR) 25 mg, Oral, Daily    omeprazole (PRILOSEC) 20 mg, 2 times daily    sumatriptan (IMITREX) 25 mg, Oral, Daily PRN       Review of Systems   See HPI    Visit Vitals  BP 92/64   Pulse 70   Temp 97.9 °F (36.6 °C)   Ht 5' 3" (1.6 m)   Wt 88.3 kg (194 lb 9.6 oz)   LMP 04/18/2023 (Approximate)   SpO2 99%   BMI 34.47 kg/m²       Physical Exam  Vitals reviewed.   Constitutional:       Appearance: Normal appearance.   Cardiovascular:      Heart sounds: Normal heart sounds.   Pulmonary:      Breath sounds: Normal breath sounds.   Musculoskeletal:      Right hand: Normal.      Left hand: Tenderness present. No swelling. Decreased range of motion (left thumb contracture). Normal sensation. Normal pulse.   Skin:     General: Skin is warm and dry.   Neurological:      Mental Status: She is oriented to person, place, and time.          Assessment & Plan:  1. Nontraumatic rupture of tendon of left thumb  Overview:  MRI pending (05/03/23)  Possible EPL tendon rupture; established with Ortho in [facility] but requesting provider closer to home due to difficulties travelling out of town to [facility].     Will send referral to Dr Kipp Cryer in [place] at patients request, although I am unsure if he works on hands. May need to redirect to Dr Espinosa or Dr Luis Felipe Brower.         Future Appointments   Date Time Provider Department Center   6/14/2023 11:00 AM GENOVEVA Rodriguez [facility]   8/24/2023  8:40 AM LAB, [facility] LABORATORY DRAW STATION [facility] SAHRA Dinh [facility]   8/29/2023 10:00 [facility]     Keep f/u for August.     Follow up if symptoms worsen or fail to improve. Call sooner if needed.    GENOVEVA Harding           "

## 2023-05-31 ENCOUNTER — TELEPHONE (OUTPATIENT)
Dept: FAMILY MEDICINE | Facility: CLINIC | Age: 53
End: 2023-05-31
Payer: MEDICAID

## 2023-05-31 NOTE — TELEPHONE ENCOUNTER
----- Message from Jumana Lim sent at 5/31/2023  9:37 AM CDT -----  Regarding: call back  Pt called says her blood pressure has been jamey running low, just not normal, says its in the mornings and afternoons, wanting to know if she needs to come in and been seen, asking for a call back.       [phone]

## 2023-05-31 NOTE — TELEPHONE ENCOUNTER
Pt stated that when she gets up in the morning she gets dizzy and her bp has been around 107-112/70. She sometimes gets dizzy in the afternoon when she changes position. I explained that she would need to come for an appointment. She is scheduled for tomorrow at 8:30am

## 2023-06-01 ENCOUNTER — OFFICE VISIT (OUTPATIENT)
Dept: FAMILY MEDICINE | Facility: CLINIC | Age: 53
End: 2023-06-01
Payer: MEDICAID

## 2023-06-01 VITALS
HEART RATE: 82 BPM | HEIGHT: 63 IN | DIASTOLIC BLOOD PRESSURE: 58 MMHG | OXYGEN SATURATION: 98 % | BODY MASS INDEX: 34.44 KG/M2 | WEIGHT: 194.38 LBS | SYSTOLIC BLOOD PRESSURE: 100 MMHG | TEMPERATURE: 97 F

## 2023-06-01 DIAGNOSIS — I10 PRIMARY HYPERTENSION: ICD-10-CM

## 2023-06-01 DIAGNOSIS — R42 DIZZINESS: Primary | ICD-10-CM

## 2023-06-01 DIAGNOSIS — Z86.19 HISTORY OF TRICHOMONIASIS: ICD-10-CM

## 2023-06-01 DIAGNOSIS — R30.0 DYSURIA: ICD-10-CM

## 2023-06-01 LAB
ALBUMIN SERPL-MCNC: 4.5 G/DL (ref 3.4–5)
ALBUMIN/GLOB SERPL: 1.6 RATIO
ALP SERPL-CCNC: 119 UNIT/L (ref 50–144)
ALT SERPL-CCNC: 28 UNIT/L (ref 1–45)
ANION GAP SERPL CALC-SCNC: 7 MEQ/L (ref 2–13)
AST SERPL-CCNC: 28 UNIT/L (ref 14–36)
BASOPHILS # BLD AUTO: 0.07 X10(3)/MCL (ref 0.01–0.08)
BASOPHILS NFR BLD AUTO: 0.8 % (ref 0.1–1.2)
BILIRUBIN DIRECT+TOT PNL SERPL-MCNC: 0.5 MG/DL (ref 0–1)
BUN SERPL-MCNC: 10 MG/DL (ref 7–20)
CALCIUM SERPL-MCNC: 9.4 MG/DL (ref 8.4–10.2)
CHLORIDE SERPL-SCNC: 101 MMOL/L (ref 98–110)
CO2 SERPL-SCNC: 29 MMOL/L (ref 21–32)
CREAT SERPL-MCNC: 0.59 MG/DL (ref 0.66–1.25)
CREAT/UREA NIT SERPL: 17 (ref 12–20)
EOSINOPHIL # BLD AUTO: 0.19 X10(3)/MCL (ref 0.04–0.36)
EOSINOPHIL NFR BLD AUTO: 2.2 % (ref 0.7–7)
ERYTHROCYTE [DISTWIDTH] IN BLOOD BY AUTOMATED COUNT: 17.6 % (ref 11–14.5)
GFR SERPLBLD CREATININE-BSD FMLA CKD-EPI: >90 MLS/MIN/1.73/M2
GLOBULIN SER-MCNC: 2.8 GM/DL (ref 2–3.9)
GLUCOSE SERPL-MCNC: 102 MG/DL (ref 70–115)
HCT VFR BLD AUTO: 36.3 % (ref 36–48)
HGB BLD-MCNC: 11.6 G/DL (ref 11.8–16)
IMM GRANULOCYTES # BLD AUTO: 0.04 X10(3)/MCL (ref 0–0.03)
IMM GRANULOCYTES NFR BLD AUTO: 0.5 % (ref 0–0.5)
LYMPHOCYTES # BLD AUTO: 1.78 X10(3)/MCL (ref 1.16–3.74)
LYMPHOCYTES NFR BLD AUTO: 20.4 % (ref 20–55)
MCH RBC QN AUTO: 22.5 PG (ref 27–34)
MCHC RBC AUTO-ENTMCNC: 32 G/DL (ref 31–37)
MCV RBC AUTO: 70.3 FL (ref 79–99)
MONOCYTES # BLD AUTO: 0.9 X10(3)/MCL (ref 0.24–0.36)
MONOCYTES NFR BLD AUTO: 10.3 % (ref 4.7–12.5)
NEUTROPHILS # BLD AUTO: 5.73 X10(3)/MCL (ref 1.56–6.13)
NEUTROPHILS NFR BLD AUTO: 65.8 % (ref 37–73)
NRBC BLD AUTO-RTO: 0 %
PLATELET # BLD AUTO: 543 X10(3)/MCL (ref 140–371)
PMV BLD AUTO: 9.6 FL (ref 9.4–12.4)
POTASSIUM SERPL-SCNC: 3.8 MMOL/L (ref 3.5–5.1)
PROT SERPL-MCNC: 7.3 GM/DL (ref 6.3–8.2)
RBC # BLD AUTO: 5.16 X10(6)/MCL (ref 4–5.1)
SODIUM SERPL-SCNC: 137 MMOL/L (ref 135–145)
WBC # SPEC AUTO: 8.71 X10(3)/MCL (ref 4–11.5)

## 2023-06-01 PROCEDURE — 99213 PR OFFICE/OUTPT VISIT, EST, LEVL III, 20-29 MIN: ICD-10-PCS | Mod: ,,, | Performed by: NURSE PRACTITIONER

## 2023-06-01 PROCEDURE — 1159F PR MEDICATION LIST DOCUMENTED IN MEDICAL RECORD: ICD-10-PCS | Mod: CPTII,,, | Performed by: NURSE PRACTITIONER

## 2023-06-01 PROCEDURE — 3074F PR MOST RECENT SYSTOLIC BLOOD PRESSURE < 130 MM HG: ICD-10-PCS | Mod: CPTII,,, | Performed by: NURSE PRACTITIONER

## 2023-06-01 PROCEDURE — 87591 N.GONORRHOEAE DNA AMP PROB: CPT | Performed by: NURSE PRACTITIONER

## 2023-06-01 PROCEDURE — 3074F SYST BP LT 130 MM HG: CPT | Mod: CPTII,,, | Performed by: NURSE PRACTITIONER

## 2023-06-01 PROCEDURE — 1159F MED LIST DOCD IN RCRD: CPT | Mod: CPTII,,, | Performed by: NURSE PRACTITIONER

## 2023-06-01 PROCEDURE — 85025 COMPLETE CBC W/AUTO DIFF WBC: CPT | Performed by: NURSE PRACTITIONER

## 2023-06-01 PROCEDURE — 3078F PR MOST RECENT DIASTOLIC BLOOD PRESSURE < 80 MM HG: ICD-10-PCS | Mod: CPTII,,, | Performed by: NURSE PRACTITIONER

## 2023-06-01 PROCEDURE — 3008F BODY MASS INDEX DOCD: CPT | Mod: CPTII,,, | Performed by: NURSE PRACTITIONER

## 2023-06-01 PROCEDURE — 1160F PR REVIEW ALL MEDS BY PRESCRIBER/CLIN PHARMACIST DOCUMENTED: ICD-10-PCS | Mod: CPTII,,, | Performed by: NURSE PRACTITIONER

## 2023-06-01 PROCEDURE — 4010F PR ACE/ARB THEARPY RXD/TAKEN: ICD-10-PCS | Mod: CPTII,,, | Performed by: NURSE PRACTITIONER

## 2023-06-01 PROCEDURE — 80053 COMPREHEN METABOLIC PANEL: CPT | Performed by: NURSE PRACTITIONER

## 2023-06-01 PROCEDURE — 4010F ACE/ARB THERAPY RXD/TAKEN: CPT | Mod: CPTII,,, | Performed by: NURSE PRACTITIONER

## 2023-06-01 PROCEDURE — 1160F RVW MEDS BY RX/DR IN RCRD: CPT | Mod: CPTII,,, | Performed by: NURSE PRACTITIONER

## 2023-06-01 PROCEDURE — 3008F PR BODY MASS INDEX (BMI) DOCUMENTED: ICD-10-PCS | Mod: CPTII,,, | Performed by: NURSE PRACTITIONER

## 2023-06-01 PROCEDURE — 3078F DIAST BP <80 MM HG: CPT | Mod: CPTII,,, | Performed by: NURSE PRACTITIONER

## 2023-06-01 PROCEDURE — 99213 OFFICE O/P EST LOW 20 MIN: CPT | Mod: ,,, | Performed by: NURSE PRACTITIONER

## 2023-06-01 RX ORDER — METRONIDAZOLE 500 MG/1
500 TABLET ORAL EVERY 12 HOURS
Qty: 14 TABLET | Refills: 0 | Status: SHIPPED | OUTPATIENT
Start: 2023-06-01 | End: 2023-06-08

## 2023-06-01 RX ORDER — TRAMADOL HYDROCHLORIDE 50 MG/1
50 TABLET ORAL EVERY 8 HOURS PRN
COMMUNITY
Start: 2023-05-17 | End: 2023-07-13

## 2023-06-01 NOTE — PROGRESS NOTES
"Patient ID: Cynthianellie Raymond  : 1970    Chief Complaint: Dizziness (Dizzy low bp x1week)    Allergies: Patient is allergic to tramadol.     History of Present Illness:  The patient is a 52 y.o. White female who presents to clinic for follow up on Dizziness (Dizzy low bp x1week)     C/o dizziness with low BP; notices mostly in the early mornings. She is on HCTZ, which she takes at bedtime. She has been spending a lot of time outside more recently. Symptoms started about a week ago after spending the day at the beach. It is intermittent in nature. No other symptoms.    She has believes that she has recurrence of Trich; was treated previously at GYN (probably last year). Has had new sexual partner since that time. She has itching, burning, thin vaginal discharge and odor. Denies lesions. She states "this is the exact symptoms I had last time Parisa treated me with Trich."    Last labs Aug 2022. Mild anemia at that time-iron deficiency.        Social History:  reports that she has been smoking cigarettes. She has a 10.00 pack-year smoking history. She does not have any smokeless tobacco history on file. She reports that she does not currently use alcohol. She reports that she does not currently use drugs after having used the following drugs: "Crack" cocaine, Hydrocodone, Marijuana, and Methamphetamines.    Past Medical History:  has a past medical history of GERD (gastroesophageal reflux disease), Hypercholesteremia, Hypertension, MISAEL (iron deficiency anemia), Migraine headache, and RLS (restless legs syndrome).    Current Medications:  Current Outpatient Medications   Medication Instructions    albuterol (PROVENTIL/VENTOLIN HFA) 90 mcg/actuation inhaler 2 puffs, Inhalation, Every 6 hours PRN    atorvastatin (LIPITOR) 20 mg, Oral, Daily    diclofenac (VOLTAREN) 75 mg, Oral, 2 times daily    fluticasone propionate (FLONASE) 50 mcg/actuation nasal spray 1 spray, Each Nostril, 2 times daily    gabapentin " "(NEURONTIN) 300 mg, Oral, 3 times daily    hydroCHLOROthiazide (HYDRODIURIL) 12.5 mg, Oral, Daily     mg, Oral, Every 6 hours PRN    losartan (COZAAR) 25 mg, Oral, Daily    omeprazole (PRILOSEC) 20 mg, 2 times daily    sumatriptan (IMITREX) 25 mg, Oral, Daily PRN    traMADoL (ULTRAM) 50 mg, Oral, Every 8 hours PRN       Review of Systems   See HPI    Visit Vitals  BP (!) 100/58 (BP Location: Left arm)   Pulse 82   Temp 97.2 °F (36.2 °C) (Temporal)   Ht 5' 3" (1.6 m)   Wt 88.2 kg (194 lb 6.4 oz)   LMP 04/18/2023 (Approximate)   SpO2 98%   BMI 34.44 kg/m²       Physical Exam  Vitals reviewed.   Constitutional:       Appearance: Normal appearance.   Cardiovascular:      Rate and Rhythm: Normal rate and regular rhythm.      Heart sounds: Normal heart sounds.   Pulmonary:      Breath sounds: Normal breath sounds.   Musculoskeletal:      Right lower leg: No edema.      Left lower leg: No edema.   Skin:     General: Skin is warm and dry.   Neurological:      General: No focal deficit present.      Mental Status: She is oriented to person, place, and time.            Assessment & Plan:  1. Dizziness  Will cut HCTZ in half; encouraged increased water intake, particularly when she is outside in the heat.   Will check some labs today to check renal function and check on anemia.  F/u 1 week.      -     CBC Auto Differential; Future; Expected date: 06/01/2023  -     Comprehensive Metabolic Panel; Future; Expected date: 06/01/2023    2. Primary hypertension  Overview:  On Losartan 25 mg daily and HCTZ 25 mg daily.     Take 1/2 HCTZ daily instead of 1 tab.     3. History of trichomoniasis  Flagyl 500 mg BID.     -     Chlamydia/GC, PCR  -     POCT URINE DIPSTICK WITHOUT MICROSCOPE    4. Dysuria  -     Chlamydia/GC, PCR  -     POCT URINE DIPSTICK WITHOUT MICROSCOPE       Future Appointments   Date Time Provider Department Center   6/2/2023  2:00 PM OA MRI1 450 LB LIMIT [facility]   6/14/2023 11:00 AM Margarita LEI" GENOVEVA Richards   8/24/2023  8:40 AM [facility]   8/29/2023 10:00 [facility]       Follow up in about 1 week (around 6/8/2023) for BP. Call sooner if needed.    GENOVEVA Harding    Lab Frequency Next Occurrence   Ambulatory referral/consult to Orthopedics Once 05/11/2023   MRI Cervical Spine Without Contrast Once 05/31/2023

## 2023-06-02 ENCOUNTER — HOSPITAL ENCOUNTER (OUTPATIENT)
Dept: RADIOLOGY | Facility: HOSPITAL | Age: 53
Discharge: HOME | End: 2023-06-02
Attending: NURSE PRACTITIONER
Payer: MEDICAID

## 2023-06-02 DIAGNOSIS — M50.10 CERVICAL DISC SYNDROME: ICD-10-CM

## 2023-06-02 LAB
C TRACH DNA SPEC QL NAA+PROBE: NOT DETECTED
N GONORRHOEA DNA SPEC QL NAA+PROBE: NOT DETECTED

## 2023-06-02 PROCEDURE — 72141 MRI NECK SPINE W/O DYE: CPT | Mod: TC

## 2023-06-03 ENCOUNTER — NURSE TRIAGE (OUTPATIENT)
Dept: ADMINISTRATIVE | Facility: CLINIC | Age: 53
End: 2023-06-03
Payer: MEDICAID

## 2023-06-04 NOTE — TELEPHONE ENCOUNTER
"Reason for Disposition   [1] Drinking very little AND [2] dehydration suspected (e.g., no urine > 12 hours, very dry mouth, very lightheaded)    Additional Information   Negative: Started suddenly after an allergic medicine, an allergic food, or bee sting   Negative: Shock suspected (e.g., cold/pale/clammy skin, too weak to stand, low BP, rapid pulse)   Negative: Difficult to awaken or acting confused (e.g., disoriented, slurred speech)   Negative: Fainted   Negative: [1] Systolic BP < 90 AND [2] dizzy, lightheaded, or weak   Negative: Chest pain   Negative: Bleeding (e.g., vomiting blood, rectal bleeding or tarry stools, severe vaginal bleeding)(Exception: Fainted from sight of small amount of blood; small cut or abrasion.)   Negative: Extra heartbeats, irregular heart beating, or heart is beating very fast  (i.e., "palpitations")   Negative: Sounds like a life-threatening emergency to the triager   Negative: [1] Systolic BP < 80 AND [2] NOT dizzy, lightheaded or weak   Negative: Abdominal pain   Negative: Fever > 100.4 F (38.0 C)   Negative: Major surgery in the past month    Protocols used: Blood Pressure - Low-A-  Pt called re seen thurs with low bP. Pt states BP dropped over past week. told to stop fluid pill. Didn't take since thurs pm. check BP 96/62HR?. 20 mins ago 101/65 HR? feeling realy bad. tried salty food. Rec ED as pt admits to feeling weak/dizzy. Pt agrees.   "

## 2023-06-05 NOTE — TELEPHONE ENCOUNTER
"I called to check on her. She said that she did not go to the ER. She increased her sodium intake and has felt better, even though her bp continues to fluctuated and goes low. I asked her if she wanted to come tomorrow to see you, but she is unable to. She also said that she her chest has been hurting but thinks she "may be coming down with a cold". I explained to her that if she is having chest pain, I had to advise her to go to the ER. She said "if it gets worse, I'll go" She has a follow up on Thursday for her bp  "

## 2023-06-08 ENCOUNTER — OFFICE VISIT (OUTPATIENT)
Dept: FAMILY MEDICINE | Facility: CLINIC | Age: 53
End: 2023-06-08
Payer: MEDICAID

## 2023-06-08 VITALS
WEIGHT: 200.81 LBS | HEART RATE: 72 BPM | SYSTOLIC BLOOD PRESSURE: 122 MMHG | TEMPERATURE: 97 F | DIASTOLIC BLOOD PRESSURE: 70 MMHG | HEIGHT: 63 IN | OXYGEN SATURATION: 97 % | BODY MASS INDEX: 35.58 KG/M2

## 2023-06-08 DIAGNOSIS — I10 PRIMARY HYPERTENSION: Primary | ICD-10-CM

## 2023-06-08 DIAGNOSIS — Z72.0 TOBACCO USER: ICD-10-CM

## 2023-06-08 DIAGNOSIS — D75.839 THROMBOCYTOSIS: ICD-10-CM

## 2023-06-08 DIAGNOSIS — R60.0 PERIPHERAL EDEMA: ICD-10-CM

## 2023-06-08 DIAGNOSIS — Z12.11 COLON CANCER SCREENING: ICD-10-CM

## 2023-06-08 DIAGNOSIS — Z80.0 FAMILY HISTORY OF MALIGNANT NEOPLASM OF COLON: ICD-10-CM

## 2023-06-08 DIAGNOSIS — M77.32 HEEL SPUR, LEFT: ICD-10-CM

## 2023-06-08 DIAGNOSIS — F19.11 HISTORY OF SUBSTANCE ABUSE: ICD-10-CM

## 2023-06-08 DIAGNOSIS — D50.8 OTHER IRON DEFICIENCY ANEMIA: ICD-10-CM

## 2023-06-08 PROCEDURE — 3078F DIAST BP <80 MM HG: CPT | Mod: CPTII,,, | Performed by: NURSE PRACTITIONER

## 2023-06-08 PROCEDURE — 3078F PR MOST RECENT DIASTOLIC BLOOD PRESSURE < 80 MM HG: ICD-10-PCS | Mod: CPTII,,, | Performed by: NURSE PRACTITIONER

## 2023-06-08 PROCEDURE — 99214 OFFICE O/P EST MOD 30 MIN: CPT | Mod: ,,, | Performed by: NURSE PRACTITIONER

## 2023-06-08 PROCEDURE — 3008F BODY MASS INDEX DOCD: CPT | Mod: CPTII,,, | Performed by: NURSE PRACTITIONER

## 2023-06-08 PROCEDURE — 1160F PR REVIEW ALL MEDS BY PRESCRIBER/CLIN PHARMACIST DOCUMENTED: ICD-10-PCS | Mod: CPTII,,, | Performed by: NURSE PRACTITIONER

## 2023-06-08 PROCEDURE — 3074F PR MOST RECENT SYSTOLIC BLOOD PRESSURE < 130 MM HG: ICD-10-PCS | Mod: CPTII,,, | Performed by: NURSE PRACTITIONER

## 2023-06-08 PROCEDURE — 1159F PR MEDICATION LIST DOCUMENTED IN MEDICAL RECORD: ICD-10-PCS | Mod: CPTII,,, | Performed by: NURSE PRACTITIONER

## 2023-06-08 PROCEDURE — 4010F ACE/ARB THERAPY RXD/TAKEN: CPT | Mod: CPTII,,, | Performed by: NURSE PRACTITIONER

## 2023-06-08 PROCEDURE — 3008F PR BODY MASS INDEX (BMI) DOCUMENTED: ICD-10-PCS | Mod: CPTII,,, | Performed by: NURSE PRACTITIONER

## 2023-06-08 PROCEDURE — 4010F PR ACE/ARB THEARPY RXD/TAKEN: ICD-10-PCS | Mod: CPTII,,, | Performed by: NURSE PRACTITIONER

## 2023-06-08 PROCEDURE — 99214 PR OFFICE/OUTPT VISIT, EST, LEVL IV, 30-39 MIN: ICD-10-PCS | Mod: ,,, | Performed by: NURSE PRACTITIONER

## 2023-06-08 PROCEDURE — 1159F MED LIST DOCD IN RCRD: CPT | Mod: CPTII,,, | Performed by: NURSE PRACTITIONER

## 2023-06-08 PROCEDURE — 3074F SYST BP LT 130 MM HG: CPT | Mod: CPTII,,, | Performed by: NURSE PRACTITIONER

## 2023-06-08 PROCEDURE — 1160F RVW MEDS BY RX/DR IN RCRD: CPT | Mod: CPTII,,, | Performed by: NURSE PRACTITIONER

## 2023-06-08 NOTE — PROGRESS NOTES
"Patient ID: Cynthia Raymond  : 1970    Chief Complaint: Follow-up (B/P f/u, labs)    Allergies: Patient is allergic to tramadol.     History of Present Illness:  The patient is a 52 y.o. White female who presents to clinic for follow up on Follow-up (B/P f/u, labs)     Seen here last week with complains of dizziness and has low BP. HCTZ was cut in half as she had reported that she had been spending a lot of time outside in the heat. We continued the low dose Losartan; however, over the weekend she had another low pressure so she stopped taking both medications. Now she feels better in general and her BP looks great but she now has some mild peripheral edema.      She has a history of drug use in past; clean x 5 years. Cocaine and meth were drugs of choice, used frequently for at least 5 years. Also has family hx of CAD at early age (father, maternal grandfather).     Hx of thrombocytosis; she tells me that this has been ongoing for many years. This was research by the previous provider apparently.     She has never had a colonoscopy; her mother  of colon cancer. Denies changes in bowel patterns, no melena, hematochezia, rectal pain, rectal bleeding, or changes in caliber of stool.      Social History:  reports that she has been smoking cigarettes. She has a 10.00 pack-year smoking history. She does not have any smokeless tobacco history on file. She reports that she does not currently use alcohol. She reports that she does not currently use drugs after having used the following drugs: "Crack" cocaine, Hydrocodone, Marijuana, and Methamphetamines.    Past Medical History:  has a past medical history of GERD (gastroesophageal reflux disease), Hypercholesteremia, Hypertension, MISAEL (iron deficiency anemia), Migraine headache, and RLS (restless legs syndrome).    Current Medications:  Current Outpatient Medications   Medication Instructions    albuterol (PROVENTIL/VENTOLIN HFA) 90 mcg/actuation inhaler 2 " "puffs, Inhalation, Every 6 hours PRN    atorvastatin (LIPITOR) 20 mg, Oral, Daily    diclofenac (VOLTAREN) 75 mg, Oral, 2 times daily    fluticasone propionate (FLONASE) 50 mcg/actuation nasal spray 1 spray, Each Nostril, 2 times daily    gabapentin (NEURONTIN) 300 mg, Oral, 3 times daily    hydroCHLOROthiazide (HYDRODIURIL) 12.5 mg, Oral, Daily     mg, Oral, Every 6 hours PRN    omeprazole (PRILOSEC) 20 mg, 2 times daily    sumatriptan (IMITREX) 25 mg, Oral, Daily PRN    traMADoL (ULTRAM) 50 mg, Oral, Every 8 hours PRN       Review of Systems   See HPI    Visit Vitals  /70 (BP Location: Left arm)   Pulse 72   Temp 96.8 °F (36 °C) (Temporal)   Ht 5' 3" (1.6 m)   Wt 91.1 kg (200 lb 12.8 oz)   SpO2 97%   BMI 35.57 kg/m²       Physical Exam  Vitals reviewed.   Constitutional:       Appearance: Normal appearance.   Cardiovascular:      Heart sounds: Normal heart sounds.   Pulmonary:      Breath sounds: Normal breath sounds.   Skin:     General: Skin is warm and dry.   Neurological:      Mental Status: She is oriented to person, place, and time.          Labs Reviewed:  Chemistry:  Lab Results   Component Value Date     06/01/2023    K 3.8 06/01/2023    CHLORIDE 101 06/01/2023    BUN 10.0 06/01/2023    CREATININE 0.59 (L) 06/01/2023    EGFRNORACEVR >90 06/01/2023    GLUCOSE 102 06/01/2023    CALCIUM 9.4 06/01/2023    ALKPHOS 119 06/01/2023    LABPROT 7.3 06/01/2023    ALBUMIN 4.5 06/01/2023    AST 28 06/01/2023    ALT 28 06/01/2023      Hematology:  Lab Results   Component Value Date    WBC 8.71 06/01/2023    RBC 5.16 (H) 06/01/2023    HGB 11.6 (L) 06/01/2023    HCT 36.3 06/01/2023    MCV 70.3 (L) 06/01/2023    MCH 22.5 (L) 06/01/2023    MCHC 32.0 06/01/2023    RDW 17.6 (H) 06/01/2023     (H) 06/01/2023    MPV 9.6 06/01/2023       Assessment & Plan:  1. Primary hypertension  Overview:  On Losartan 25 mg daily and HCTZ 25 mg daily.     STOP Losartan and continue with HCTZ 12.5 mg daily.   Well " controlled.   Low Sodium Diet (DASH Diet - Less than 2 grams of sodium per day).  Monitor blood pressure daily and log. Report consistent numbers greater than 140/90.  Maintain healthy weight with goal BMI <30. Exercise 30 minutes per day, 5 days per week.  Smoking cessation encouraged to aid in BP reduction.    2. Peripheral edema  Discussed following a low-sodium diet, wear compression stockings, elevate legs frequently.  Hopefully resumption of HCTZ will also help with this peripheral edema.  Given her history of frequent substance abuse in the past we will send her to cardiology for an echocardiogram and further assessment.    3. Other iron deficiency anemia  Stable.    4. Thrombocytosis  Platelets 543; likely reactive secondary MISAEL. Will research in [employer] to see what conclusion previously provider came to.  Add peripheral smear to labs.     5. Colon cancer screening  -     Ambulatory referral/consult to Family Practice; Future; Expected date: 06/15/2023    6. Family history of malignant neoplasm of colon  Overview:  Mother  at 70 y/o with colorectal cancer    7. Tobacco user  Overview:  1/2 PPD    8. History of substance abuse  Overview:  Clean x 5 years; previous frequent cocaine and Meth use.        Future Appointments   Date Time Provider Department Center   2023 11:00 AM GENOVEVA Rodriguez [facility]   2023  2:45 PM NEUROSURGERY RESIDENT [facility] JUAN CARLOS [facility]   2023  8:40 AM LAB, [facility] LABORATORY DRAW STATION [facility] SAHRA Dinh [facility]   2023 10:00 AM [facility]     Cancel shingle appt next week; patient will go to a local pharmacy for vaccination.  Follow up if symptoms worsen or fail to improve. Call sooner if needed.    GENOVEVA Harding    Lab Frequency Next Occurrence   Ambulatory referral/consult to Orthopedics Once 2023

## 2023-06-14 DIAGNOSIS — M54.2 NECK PAIN: Primary | ICD-10-CM

## 2023-06-14 RX ORDER — IBUPROFEN 600 MG/1
600 TABLET ORAL EVERY 6 HOURS PRN
Qty: 30 TABLET | Refills: 3 | Status: SHIPPED | OUTPATIENT
Start: 2023-06-14 | End: 2024-02-05 | Stop reason: SDUPTHER

## 2023-07-01 ENCOUNTER — PATIENT MESSAGE (OUTPATIENT)
Dept: FAMILY MEDICINE | Facility: CLINIC | Age: 53
End: 2023-07-01
Payer: MEDICAID

## 2023-07-05 ENCOUNTER — TELEPHONE (OUTPATIENT)
Dept: FAMILY MEDICINE | Facility: CLINIC | Age: 53
End: 2023-07-05
Payer: MEDICAID

## 2023-07-05 RX ORDER — GABAPENTIN 600 MG/1
600 TABLET ORAL 3 TIMES DAILY
Qty: 90 TABLET | Refills: 11 | Status: SHIPPED | OUTPATIENT
Start: 2023-07-05 | End: 2024-07-04

## 2023-07-05 RX ORDER — CYCLOBENZAPRINE HCL 5 MG
5 TABLET ORAL 3 TIMES DAILY PRN
Qty: 90 TABLET | Refills: 0 | Status: SHIPPED | OUTPATIENT
Start: 2023-07-05 | End: 2023-08-01 | Stop reason: SDUPTHER

## 2023-07-05 NOTE — TELEPHONE ENCOUNTER
----- Message from Orlando Shon sent at 7/5/2023  9:53 AM CDT -----  Regarding: general  Pt went to her neurosurgeon Dr. Marcus told pt for her pcp to increase  to gabapentin 800 mg twice a day and on an muscle relaxer has taken flexeril has been taken in the pass by pt.    [phone]

## 2023-07-12 NOTE — PROGRESS NOTES
"Chief Complaint: Annual exam    Chief Complaint   Patient presents with    Well Woman     Annual Gyn Exam.        HPI:   52 y.o. F  s/p tubal ligation, presents for an annual gyn exam. Pt reports she has missed x2 cycles and reports hot flashes and night sweats since missed cycle.  Has recently been treated for trich by PCP (2023), desires STD screening. Pt c/o irritation to vulva x4-5 days.    Pt also c/o long standing pelvic pain (a couple years), history of ovarian cyst seen on 2021 Pelvic US. Impression of US states "A 4.0 cm, partially exophytic, benign appearing, simple cyst is noted originating from the left ovary."    Second pelvic US requested, pt never completed.     FmHx: +colon cancer in Mother, negative for breast, uterine, and ovarian cancers.     Labs / Significant Studies:  LMP: 2023   Frequency: monthly    Cycle Length: 5-6 days   Flow: heavy  Intermenstrual Bleeding: Occasionally  Postcoital Bleeding: No  Dysmenorrhea: Yes, Severe  Sexually Active: Yes   Dyspareunia: No  Contraception: Tubal ligation   H/o STI: TV  Last pap: 21 - Negative w/ Neg HPV  H/o Abnl pap:no  Colposcopy: no  Gardasil x0  MM2021, benign  H/o abnl MMG: No  Colonoscopy: Never    Family History   Problem Relation Age of Onset    Hypertension Father     Heart disease Father     Arthritis Father     Asthma Father     Colon cancer Mother 72    Cancer Mother     Diabetes Mother     Depression Sister     Breast cancer Neg Hx     Ovarian cancer Neg Hx     Uterine cancer Neg Hx     Cervical cancer Neg Hx          Past Medical History:   Diagnosis Date    GERD (gastroesophageal reflux disease)     Hypercholesteremia     Hypertension     MISAEL (iron deficiency anemia)     Migraine headache     PID (pelvic inflammatory disease)     RLS (restless legs syndrome)     STD (sexually transmitted disease)     Substance abuse     Tobacco use      Past Surgical History:   Procedure Laterality Date     " "SECTION       SECTION       SECTION WITH TUBAL LIGATION  1996    CHOLECYSTECTOMY  2020    DILATION AND CURETTAGE OF UTERUS  1990    FOREARM SURGERY Right     Fractured    KNEE ARTHROSCOPY Left     SHOULDER ARTHROSCOPY Right        Current Outpatient Medications:     albuterol (PROVENTIL/VENTOLIN HFA) 90 mcg/actuation inhaler, Inhale 2 puffs into the lungs every 6 (six) hours as needed., Disp: , Rfl:     atorvastatin (LIPITOR) 20 MG tablet, Take 20 mg by mouth once daily., Disp: , Rfl:     cyclobenzaprine (FLEXERIL) 5 MG tablet, Take 1 tablet (5 mg total) by mouth 3 (three) times daily as needed for Muscle spasms., Disp: 90 tablet, Rfl: 0    fluticasone propionate (FLONASE) 50 mcg/actuation nasal spray, 1 spray by Each Nostril route 2 (two) times daily., Disp: , Rfl:     gabapentin (NEURONTIN) 600 MG tablet, Take 1 tablet (600 mg total) by mouth 3 (three) times daily., Disp: 90 tablet, Rfl: 11    hydroCHLOROthiazide (HYDRODIURIL) 12.5 MG Tab, Take 12.5 mg by mouth., Disp: , Rfl:      mg tablet, Take 1 tablet (600 mg total) by mouth every 6 (six) hours as needed for Pain., Disp: 30 tablet, Rfl: 3    nitroGLYCERIN (NITROSTAT) 0.4 MG SL tablet, Place under the tongue., Disp: , Rfl:     omeprazole (PRILOSEC) 20 MG capsule, 20 mg 2 (two) times a day., Disp: , Rfl:     sumatriptan (IMITREX) 25 MG Tab, Take 25 mg by mouth daily as needed., Disp: , Rfl:     UNABLE TO FIND, Apply 1 mL topically 3 (three) times daily as needed. medication name: Compound Medication (Diclofenac and Lidocaine), Disp: , Rfl:     Review of patient's allergies indicates:   Allergen Reactions    Tramadol Itching       Social History     Tobacco Use    Smoking status: Every Day     Packs/day: 0.50     Years: 20.00     Pack years: 10.00     Types: Cigarettes     Start date:      Passive exposure: Never   Substance Use Topics    Alcohol use: Not Currently    Drug use: Not Currently     Types: "Crack" cocaine, " "Hydrocodone, Marijuana, Methamphetamines, Oxycodone     Comment: 5 years clean       Review of Systems:  General/Constitutional: Chills denies. Fatigue/weakness denies. Fever denies. Night sweats admits. Hot flashes admits    Respiratory: Cough denies. Hemoptysis denies. SOB denies. Sputum production denies. Wheezing denies .   Cardiovascular: Chest pain denies . Dizziness denies. Palpitations denies. Swelling in hands/feet denies    Gastrointestinal: Abdominal pain denies. Blood in stool denies. Constipation denies. Diarrhea denies. Heartburn denies. Nausea denies. Vomiting denies    Genitourinary: Incontinence denies. Blood in urine denies. Frequent urination denies. Painful urination denies. Urinary urgency denies. Nocturia denies    Gynecologic: Irregular menses denies. Heavy bleeding denies. Painful menses denies. Vaginal discharge denies. Vaginal odor denies. Vaginal itching denies. Vaginal lesion denies. Pelvic pain admits. Decreased libido denies. Vulvar lesion denies. Prolapse of genital organs denies. Painful intercourse denies. Postcoital bleeding denies  Vulva irritation, admits.   Psychiatric: Depression denies. Anxiety denies     Physical Exam:   Vitals:    07/13/23 1401   BP: 110/80   BP Location: Left arm   Patient Position: Sitting   BP Method: Large (Manual)   Temp: 97.9 °F (36.6 °C)   TempSrc: Temporal   Weight: 92.5 kg (203 lb 14.8 oz)   Height: 5' 2" (1.575 m)       Body mass index is 37.3 kg/m².       Chaperone: present.     General appearance: healthy, well-nourished and well-developed     Psychiatric: Orientation to time, place and person. Normal mood and affect and active, alert     Skin: Appearance: no rashes or lesions.     Neck:   Neck: supple, FROM, trachea midline. and no masses   Thyroid: no enlargement or nodules and non-tender.       Cardiovascular:   Auscultation: RRR and no murmur.   Peripheral Vascular: no varicosities, LLE edema, RLE edema, calf tenderness, and palpable cord and " pedal pulses intact.     Lungs:   Respiratory effort: no intercostal retractions or accessory muscle usage.   Auscultation: no wheezing, rales/crackles, or rhonchi and clear to auscultation.     Breast:   Inspection/Palpation: no tenderness, discrete/distinct masses, skin changes, or abnormal secretions. Nipple appearance normal.     Abdomen:   Auscultation/Inspection/Palpation: no hepatomegaly, splenomegaly, masses, tenderness or CVA tenderness and soft, non-distended bowel sounds preset.    Hernia: no palpable hernias.     Female Genitalia:    Vulva: no masses, tenderness or lesions    Bladder/Urethra: no urethral discharge or mass, normal meatus, bladder non-distended.    Vagina: no tenderness, erythema, cystocele, rectocele, abnormal vaginal discharge or vesicle(s) or ulcers   +scant gray discharge   Cervix: no discharge, no cervical lacerations noted or motion tenderness  +friable   Uterus: normal size and shape and midline, non-tender, and no uterine prolapse.    Adnexa/Parametria: no parametrial tenderness or mass, no (R) adnexal tenderness or ovarian mass.   +L ovary tender    Lymph Nodes:   Palpation: non tender submandibular nodes, axillary nodes, or inguinal nodes.     Rectal Exam:   Rectum: normal perianal skin.       Assessment:     Patient Active Problem List   Diagnosis    Other chronic pain    Chronic rhinosinusitis    Family history of malignant neoplasm of colon    Gastroesophageal reflux disease    Hypertension    Iron deficiency anemia    Migraine headache    Mixed hyperlipidemia    Obesity    Restless legs syndrome    Tobacco user    Nontraumatic rupture of tendon of left thumb    Thrombocytosis    Peripheral edema    History of substance abuse    Heel spur, left       Health Maintenance Due   Topic Date Due    Hepatitis C Screening  Never done    Lipid Panel  Never done    HIV Screening  Never done    Hemoglobin A1c (Diabetic Prevention Screening)  Never done    Colorectal Cancer Screening   Never done    Pneumococcal Vaccines (Age 0-64) (2 - PPSV23 if available, else PCV20) 08/08/2017    COVID-19 Vaccine (4 - Moderna series) 03/11/2022    Mammogram  08/18/2022    Shingles Vaccine (2 of 2) 03/29/2023     Health Maintenance Topics with due status: Not Due       Topic Last Completion Date    TETANUS VACCINE 07/05/2021    Cervical Cancer Screening 08/23/2021    Influenza Vaccine 02/01/2023         Plan:    Cynthia was seen today for well woman.    Diagnoses and all orders for this visit:    Abnormal gynecological examination  PAP HPV  GC/CZ/TV  One Swab Myco/Urea  Counseled regarding contraceptive options, and need for contraception until no menses for 1 year.  Reviewed calcium needs, exercise, and prevention of osteoporosis.  Healthy diet and light weightbearing exercise if tolerated.  Reviewed normal perimenopausal transition.  Mammogram recommended yearly.  Colonoscopy recommended at age 45.  RTC 1 y    Bacterial vaginosis  Rx: Flagyl 500mg BID x7 days    Vaginal discharge  - AVOID: Scented soaps or shampoos, Bubble bath, Scented detergens, Feminine sprays, douches, powders    - Fragrance-free pH neutral soap    - Unscented detergents    Vaginal irritation    Pelvic pain  - Educated    - NSAIDs, heating pad    - Pain/ER precautions    Pelvic US ordered.    Friable cervix    Oligomenorrhea, unspecified type  -     POCT urine pregnancy (Negative)    Cervical cancer screening    Encounter for screening mammogram for malignant neoplasm of breast  -     Cancel: Mammo Digital Screening Bilat w/ Bigg; Future  -     Mammo Digital Screening Bilat w/ Bigg; Future    Family history of colon cancer  - Educated    - Advised pt to continue with Colonoscopy per PCP.       RTC for u/s results, pap and culture results, MMG results

## 2023-07-13 ENCOUNTER — OFFICE VISIT (OUTPATIENT)
Dept: OBSTETRICS AND GYNECOLOGY | Facility: CLINIC | Age: 53
End: 2023-07-13
Payer: MEDICAID

## 2023-07-13 VITALS
HEIGHT: 62 IN | WEIGHT: 203.94 LBS | DIASTOLIC BLOOD PRESSURE: 80 MMHG | BODY MASS INDEX: 37.53 KG/M2 | TEMPERATURE: 98 F | SYSTOLIC BLOOD PRESSURE: 110 MMHG

## 2023-07-13 DIAGNOSIS — N89.8 VAGINAL IRRITATION: ICD-10-CM

## 2023-07-13 DIAGNOSIS — Z12.4 CERVICAL CANCER SCREENING: ICD-10-CM

## 2023-07-13 DIAGNOSIS — N91.5 OLIGOMENORRHEA, UNSPECIFIED TYPE: ICD-10-CM

## 2023-07-13 DIAGNOSIS — R10.2 PELVIC PAIN: ICD-10-CM

## 2023-07-13 DIAGNOSIS — Z01.411 ABNORMAL GYNECOLOGICAL EXAMINATION: Primary | ICD-10-CM

## 2023-07-13 DIAGNOSIS — N89.8 VAGINAL DISCHARGE: ICD-10-CM

## 2023-07-13 DIAGNOSIS — F17.200 NEEDS SMOKING CESSATION EDUCATION: ICD-10-CM

## 2023-07-13 DIAGNOSIS — Z80.0 FAMILY HISTORY OF COLON CANCER: ICD-10-CM

## 2023-07-13 DIAGNOSIS — N88.8 FRIABLE CERVIX: ICD-10-CM

## 2023-07-13 DIAGNOSIS — Z12.31 ENCOUNTER FOR SCREENING MAMMOGRAM FOR MALIGNANT NEOPLASM OF BREAST: ICD-10-CM

## 2023-07-13 DIAGNOSIS — N76.0 BACTERIAL VAGINOSIS: ICD-10-CM

## 2023-07-13 DIAGNOSIS — B96.89 BACTERIAL VAGINOSIS: ICD-10-CM

## 2023-07-13 LAB
B-HCG UR QL: NEGATIVE
BACTERIA HYPHAE, POC: NEGATIVE
CTP QC/QA: YES
GARDNERELLA VAGINALIS: NEGATIVE
OTHER MICROSC. OBSERVATIONS: POSITIVE
POC BACTERIAL VAGINOSIS: POSITIVE
POC CLUE CELLS: POSITIVE
TRICHOMONAS, POC: NEGATIVE
YEAST WET PREP: NEGATIVE
YEAST, POC: NEGATIVE

## 2023-07-13 PROCEDURE — 4010F ACE/ARB THERAPY RXD/TAKEN: CPT | Mod: CPTII,,, | Performed by: NURSE PRACTITIONER

## 2023-07-13 PROCEDURE — 3079F DIAST BP 80-89 MM HG: CPT | Mod: CPTII,,, | Performed by: NURSE PRACTITIONER

## 2023-07-13 PROCEDURE — 87210 SMEAR WET MOUNT SALINE/INK: CPT | Mod: QW,,, | Performed by: NURSE PRACTITIONER

## 2023-07-13 PROCEDURE — 81025 URINE PREGNANCY TEST: CPT | Mod: ,,, | Performed by: NURSE PRACTITIONER

## 2023-07-13 PROCEDURE — 3008F PR BODY MASS INDEX (BMI) DOCUMENTED: ICD-10-PCS | Mod: CPTII,,, | Performed by: NURSE PRACTITIONER

## 2023-07-13 PROCEDURE — 81025 POCT URINE PREGNANCY: ICD-10-PCS | Mod: ,,, | Performed by: NURSE PRACTITIONER

## 2023-07-13 PROCEDURE — 3074F PR MOST RECENT SYSTOLIC BLOOD PRESSURE < 130 MM HG: ICD-10-PCS | Mod: CPTII,,, | Performed by: NURSE PRACTITIONER

## 2023-07-13 PROCEDURE — 99396 PREV VISIT EST AGE 40-64: CPT | Mod: ,,, | Performed by: NURSE PRACTITIONER

## 2023-07-13 PROCEDURE — 1160F RVW MEDS BY RX/DR IN RCRD: CPT | Mod: CPTII,,, | Performed by: NURSE PRACTITIONER

## 2023-07-13 PROCEDURE — 3074F SYST BP LT 130 MM HG: CPT | Mod: CPTII,,, | Performed by: NURSE PRACTITIONER

## 2023-07-13 PROCEDURE — 4010F PR ACE/ARB THEARPY RXD/TAKEN: ICD-10-PCS | Mod: CPTII,,, | Performed by: NURSE PRACTITIONER

## 2023-07-13 PROCEDURE — 87210 POCT KOH: ICD-10-PCS | Mod: QW,,, | Performed by: NURSE PRACTITIONER

## 2023-07-13 PROCEDURE — 1159F PR MEDICATION LIST DOCUMENTED IN MEDICAL RECORD: ICD-10-PCS | Mod: CPTII,,, | Performed by: NURSE PRACTITIONER

## 2023-07-13 PROCEDURE — 1160F PR REVIEW ALL MEDS BY PRESCRIBER/CLIN PHARMACIST DOCUMENTED: ICD-10-PCS | Mod: CPTII,,, | Performed by: NURSE PRACTITIONER

## 2023-07-13 PROCEDURE — 1159F MED LIST DOCD IN RCRD: CPT | Mod: CPTII,,, | Performed by: NURSE PRACTITIONER

## 2023-07-13 PROCEDURE — 3008F BODY MASS INDEX DOCD: CPT | Mod: CPTII,,, | Performed by: NURSE PRACTITIONER

## 2023-07-13 PROCEDURE — 99396 PR PREVENTIVE VISIT,EST,40-64: ICD-10-PCS | Mod: ,,, | Performed by: NURSE PRACTITIONER

## 2023-07-13 PROCEDURE — 3079F PR MOST RECENT DIASTOLIC BLOOD PRESSURE 80-89 MM HG: ICD-10-PCS | Mod: CPTII,,, | Performed by: NURSE PRACTITIONER

## 2023-07-13 RX ORDER — HYDROCHLOROTHIAZIDE 12.5 MG/1
12.5 TABLET ORAL
COMMUNITY
Start: 2023-06-14 | End: 2023-10-20 | Stop reason: SDUPTHER

## 2023-07-13 RX ORDER — METRONIDAZOLE 500 MG/1
500 TABLET ORAL EVERY 12 HOURS
Qty: 14 TABLET | Refills: 0 | Status: SHIPPED | OUTPATIENT
Start: 2023-07-13 | End: 2023-07-20

## 2023-07-13 RX ORDER — NITROGLYCERIN 0.4 MG/1
TABLET SUBLINGUAL
COMMUNITY
Start: 2023-07-05

## 2023-07-14 ENCOUNTER — OFFICE VISIT (OUTPATIENT)
Dept: FAMILY MEDICINE | Facility: CLINIC | Age: 53
End: 2023-07-14
Payer: MEDICAID

## 2023-07-14 VITALS
HEIGHT: 62 IN | WEIGHT: 205 LBS | SYSTOLIC BLOOD PRESSURE: 100 MMHG | HEART RATE: 63 BPM | BODY MASS INDEX: 37.73 KG/M2 | OXYGEN SATURATION: 96 % | TEMPERATURE: 97 F | DIASTOLIC BLOOD PRESSURE: 68 MMHG

## 2023-07-14 DIAGNOSIS — B02.9 HERPES ZOSTER WITHOUT COMPLICATION: Primary | ICD-10-CM

## 2023-07-14 DIAGNOSIS — R23.8 VESICULAR RASH: ICD-10-CM

## 2023-07-14 PROCEDURE — 1159F MED LIST DOCD IN RCRD: CPT | Mod: CPTII,,, | Performed by: NURSE PRACTITIONER

## 2023-07-14 PROCEDURE — 87254 VIRUS INOCULATION SHELL VIA: CPT | Performed by: NURSE PRACTITIONER

## 2023-07-14 PROCEDURE — 3074F SYST BP LT 130 MM HG: CPT | Mod: CPTII,,, | Performed by: NURSE PRACTITIONER

## 2023-07-14 PROCEDURE — 3078F PR MOST RECENT DIASTOLIC BLOOD PRESSURE < 80 MM HG: ICD-10-PCS | Mod: CPTII,,, | Performed by: NURSE PRACTITIONER

## 2023-07-14 PROCEDURE — 99214 PR OFFICE/OUTPT VISIT, EST, LEVL IV, 30-39 MIN: ICD-10-PCS | Mod: ,,, | Performed by: NURSE PRACTITIONER

## 2023-07-14 PROCEDURE — 3008F BODY MASS INDEX DOCD: CPT | Mod: CPTII,,, | Performed by: NURSE PRACTITIONER

## 2023-07-14 PROCEDURE — 1159F PR MEDICATION LIST DOCUMENTED IN MEDICAL RECORD: ICD-10-PCS | Mod: CPTII,,, | Performed by: NURSE PRACTITIONER

## 2023-07-14 PROCEDURE — 4010F ACE/ARB THERAPY RXD/TAKEN: CPT | Mod: CPTII,,, | Performed by: NURSE PRACTITIONER

## 2023-07-14 PROCEDURE — 3008F PR BODY MASS INDEX (BMI) DOCUMENTED: ICD-10-PCS | Mod: CPTII,,, | Performed by: NURSE PRACTITIONER

## 2023-07-14 PROCEDURE — 1160F PR REVIEW ALL MEDS BY PRESCRIBER/CLIN PHARMACIST DOCUMENTED: ICD-10-PCS | Mod: CPTII,,, | Performed by: NURSE PRACTITIONER

## 2023-07-14 PROCEDURE — 99214 OFFICE O/P EST MOD 30 MIN: CPT | Mod: ,,, | Performed by: NURSE PRACTITIONER

## 2023-07-14 PROCEDURE — 3074F PR MOST RECENT SYSTOLIC BLOOD PRESSURE < 130 MM HG: ICD-10-PCS | Mod: CPTII,,, | Performed by: NURSE PRACTITIONER

## 2023-07-14 PROCEDURE — 4010F PR ACE/ARB THEARPY RXD/TAKEN: ICD-10-PCS | Mod: CPTII,,, | Performed by: NURSE PRACTITIONER

## 2023-07-14 PROCEDURE — 1160F RVW MEDS BY RX/DR IN RCRD: CPT | Mod: CPTII,,, | Performed by: NURSE PRACTITIONER

## 2023-07-14 PROCEDURE — 3078F DIAST BP <80 MM HG: CPT | Mod: CPTII,,, | Performed by: NURSE PRACTITIONER

## 2023-07-14 RX ORDER — TRAMADOL HYDROCHLORIDE 50 MG/1
50 TABLET ORAL EVERY 8 HOURS PRN
Qty: 20 TABLET | Refills: 0 | Status: SHIPPED | OUTPATIENT
Start: 2023-07-14 | End: 2023-07-21

## 2023-07-14 RX ORDER — VALACYCLOVIR HYDROCHLORIDE 1 G/1
1000 TABLET, FILM COATED ORAL
Qty: 14 TABLET | Refills: 0 | Status: SHIPPED | OUTPATIENT
Start: 2023-07-14 | End: 2023-08-29

## 2023-07-14 NOTE — PROGRESS NOTES
"Patient ID: Cynthianellie Raymond  : 1970     Chief Complaint: Rash    Allergies: Patient is allergic to tramadol.     History of Present Illness:  The patient is a 52 y.o. White female who presents to clinic for evaluation and management with a chief complaint of Rash   Reports burning in right chest /shoulder area 2 days ago and then noticed 3 bumps to area last night     Rash  This is a new problem. The current episode started yesterday. The problem has been gradually worsening since onset. The affected locations include the chest. The rash is characterized by blistering and burning. It is unknown if there was an exposure to a precipitant. Associated symptoms include fatigue and joint pain. Pertinent negatives include no anorexia, congestion, cough, diarrhea, eye pain, facial edema, fever, nail changes, rhinorrhea, shortness of breath, sore throat or vomiting. Past treatments include analgesics. The treatment provided no relief.      Past Medical History:  has a past medical history of GERD (gastroesophageal reflux disease), Hypercholesteremia, Hypertension, MISAEL (iron deficiency anemia), Migraine headache, PID (pelvic inflammatory disease), RLS (restless legs syndrome), STD (sexually transmitted disease), Substance abuse, and Tobacco use.    Social History:  reports that she has been smoking cigarettes. She started smoking about 20 years ago. She has a 10.00 pack-year smoking history. She has never been exposed to tobacco smoke. She does not have any smokeless tobacco history on file. She reports that she does not currently use alcohol. She reports that she does not currently use drugs after having used the following drugs: "Crack" cocaine, Hydrocodone, Marijuana, Methamphetamines, and Oxycodone.    Care Team: Patient Care Team:  GENOVEVA Rodriguez as PCP - General (Family Medicine)  Evan Mahajan OD as Consulting Physician (Optometry)  Tong Rodriguez MD (Orthopedic Surgery)  Parisa " "Kale Mendoza NP as Nurse Practitioner (Obstetrics and Gynecology)     Current Medications:  Current Outpatient Medications   Medication Instructions    albuterol (PROVENTIL/VENTOLIN HFA) 90 mcg/actuation inhaler 2 puffs, Inhalation, Every 6 hours PRN    atorvastatin (LIPITOR) 20 mg, Oral, Daily    cyclobenzaprine (FLEXERIL) 5 mg, Oral, 3 times daily PRN    fluticasone propionate (FLONASE) 50 mcg/actuation nasal spray 1 spray, Each Nostril, 2 times daily    gabapentin (NEURONTIN) 600 mg, Oral, 3 times daily    hydroCHLOROthiazide (HYDRODIURIL) 12.5 mg, Oral     mg, Oral, Every 6 hours PRN    metroNIDAZOLE (FLAGYL) 500 mg, Oral, Every 12 hours    nitroGLYCERIN (NITROSTAT) 0.4 MG SL tablet Sublingual    omeprazole (PRILOSEC) 20 mg, 2 times daily    sumatriptan (IMITREX) 25 mg, Oral, Daily PRN    UNABLE TO FIND 1 mL, Topical (Top), 3 times daily PRN, medication name: Compound Medication (Diclofenac and Lidocaine)       Review of Systems   Constitutional:  Positive for fatigue. Negative for fever.   HENT:  Negative for nasal congestion, rhinorrhea and sore throat.    Eyes:  Negative for pain.   Respiratory:  Negative for cough and shortness of breath.    Gastrointestinal:  Negative for anorexia, diarrhea and vomiting.   Musculoskeletal:  Positive for joint pain.   Integumentary:  Positive for rash. Negative for nail changes.      Visit Vitals  /68   Pulse 63   Temp 97.2 °F (36.2 °C) (Temporal)   Ht 5' 2" (1.575 m)   Wt 93 kg (205 lb)   LMP 05/15/2023 (Approximate)   SpO2 96%   BMI 37.49 kg/m²       Physical Exam  Vitals reviewed.   Constitutional:       General: She is not in acute distress.     Appearance: Normal appearance.   HENT:      Head: Normocephalic and atraumatic.      Nose: Nose normal. No congestion.      Mouth/Throat:      Mouth: Mucous membranes are moist.      Pharynx: Oropharynx is clear.   Eyes:      Conjunctiva/sclera: Conjunctivae normal.   Cardiovascular:      Rate and Rhythm: Normal " rate and regular rhythm.      Pulses: Normal pulses.   Pulmonary:      Effort: Pulmonary effort is normal.      Breath sounds: Normal breath sounds.   Musculoskeletal:      Cervical back: Tenderness: right trapezius, negative spurling, no tenderness over cervical spine.   Lymphadenopathy:      Cervical: No cervical adenopathy.   Skin:     General: Skin is warm and dry.      Capillary Refill: Capillary refill takes less than 2 seconds.      Coloration: Skin is not jaundiced.      Findings: Rash present. Rash is vesicular (4 vesicles with clear fluid to right anterior chest wall with mild erythema, significant tenderness with mild palpation).          Neurological:      Mental Status: She is alert and oriented to person, place, and time.      Cranial Nerves: No cranial nerve deficit.   Psychiatric:         Mood and Affect: Mood normal.         Behavior: Behavior normal.        Labs Reviewed:  Chemistry:  Lab Results   Component Value Date     06/01/2023    K 3.8 06/01/2023    CHLORIDE 101 06/01/2023    BUN 10.0 06/01/2023    CREATININE 0.59 (L) 06/01/2023    EGFRNORACEVR >90 06/01/2023    GLUCOSE 102 06/01/2023    CALCIUM 9.4 06/01/2023    ALKPHOS 119 06/01/2023    LABPROT 7.3 06/01/2023    ALBUMIN 4.5 06/01/2023    AST 28 06/01/2023    ALT 28 06/01/2023        Hematology:  Lab Results   Component Value Date    WBC 8.71 06/01/2023    RBC 5.16 (H) 06/01/2023    HGB 11.6 (L) 06/01/2023    HCT 36.3 06/01/2023    MCV 70.3 (L) 06/01/2023    MCH 22.5 (L) 06/01/2023    MCHC 32.0 06/01/2023    RDW 17.6 (H) 06/01/2023     (H) 06/01/2023    MPV 9.6 06/01/2023         Assessment & Plan:  1. Herpes zoster without complication  Comments:  treat based on symptoms. culture obtained  Orders:  -     valACYclovir (VALTREX) 1000 MG tablet; Take 1 tablet (1,000 mg total) by mouth every 8 (eight) hours while awake. for 7 days  Dispense: 14 tablet; Refill: 0  -     traMADoL (ULTRAM) 50 mg tablet; Take 1 tablet (50 mg total) by  mouth every 8 (eight) hours as needed for Pain.  Dispense: 20 tablet; Refill: 0    2. Vesicular rash  -     Varicella-Zoster Virus, PCR; Future; Expected date: 07/14/2023         Future Appointments   Date Time Provider Department Center   7/27/2023 10:20 AM EBONI Fontanez OB   8/24/2023  8:40 AM LAB, [facility] LABORATORY DRAW STATION [facility] Fabrizio Hussein   8/29/2023 10:00 AM GENOVEVA Rodriguez [facility] Fabrizio [facility]       Follow up if symptoms worsen or fail to improve, for Keep Apt as Scheduled. Call sooner if needed.    TRINIDAD CHARLES    Lab Frequency Next Occurrence   Ambulatory referral/consult to Orthopedics Once 05/11/2023   Ambulatory referral/consult to Family Practice Once 06/15/2023   Path Review, Peripheral Smear Once 06/08/2023   Colonoscopy Once 06/26/2023   Mammo Digital Screening Bilat w/ Bigg Once 07/13/2023   US Pelvis Comp with Transvag NON-OB (xpd Once 07/13/2023   Ambulatory referral/consult to Smoking Cessation Program Once 07/20/2023

## 2023-07-18 ENCOUNTER — HOSPITAL ENCOUNTER (OUTPATIENT)
Dept: RADIOLOGY | Facility: HOSPITAL | Age: 53
Discharge: HOME | End: 2023-07-18
Attending: NURSE PRACTITIONER
Payer: MEDICAID

## 2023-07-18 VITALS — WEIGHT: 205 LBS | HEIGHT: 62 IN | BODY MASS INDEX: 37.73 KG/M2

## 2023-07-18 DIAGNOSIS — Z12.31 ENCOUNTER FOR SCREENING MAMMOGRAM FOR MALIGNANT NEOPLASM OF BREAST: ICD-10-CM

## 2023-07-18 DIAGNOSIS — R10.2 PELVIC PAIN: ICD-10-CM

## 2023-07-18 LAB — PSYCHE PATHOLOGY RESULT: NORMAL

## 2023-07-18 PROCEDURE — 76856 US EXAM PELVIC COMPLETE: CPT | Mod: TC

## 2023-07-18 PROCEDURE — 77067 SCR MAMMO BI INCL CAD: CPT | Mod: TC

## 2023-07-20 NOTE — PROGRESS NOTES
Attempted to call pt to notify her of +UU results from 007/13/2023. Pt did not answer, no vm. Please treat per protocol.

## 2023-07-21 ENCOUNTER — TELEPHONE (OUTPATIENT)
Dept: OBSTETRICS AND GYNECOLOGY | Facility: CLINIC | Age: 53
End: 2023-07-21
Payer: MEDICAID

## 2023-07-21 DIAGNOSIS — Z22.39 CARRIER OF UREAPLASMA UREALYTICUM: Primary | ICD-10-CM

## 2023-07-21 NOTE — TELEPHONE ENCOUNTER
----- Message from Parisa Mendoza NP sent at 7/19/2023  9:18 AM CDT -----    ----- Message -----  From: Parisa Mendoza NP  Sent: 7/19/2023   7:57 AM CDT  To: Kelly Henley Staff    + ureaplasma, please treat according to protocol

## 2023-07-24 ENCOUNTER — PATIENT MESSAGE (OUTPATIENT)
Dept: ADMINISTRATIVE | Facility: HOSPITAL | Age: 53
End: 2023-07-24
Payer: MEDICAID

## 2023-07-26 RX ORDER — DOXYCYCLINE 100 MG/1
100 CAPSULE ORAL 2 TIMES DAILY
Qty: 14 CAPSULE | Refills: 0 | Status: SHIPPED | OUTPATIENT
Start: 2023-07-26 | End: 2023-08-29 | Stop reason: ALTCHOICE

## 2023-07-26 NOTE — PROGRESS NOTES
Chief Complaint:     No chief complaint on file.        HPI:   52 y.o.  presents to follow up on Pelvic US and One swab results.     Pelvic US: 2023 reviewed with patient.  Findings:   Uterus: The uterus measures 7.7 x 4.3 x 5.1 cm with no worrisome myometrial abnormalities appreciated.  The endometrial stripe measures 5 mm in AP thickness on transvaginal imaging.     Ovaries: The right ovary measures 2.2 x 1.9 x 1.2 cm in the left ovary measures 2.2 x 2.0 x 2.3 cm with no worrisome ovarian or adnexal abnormalities appreciated.     Miscellaneous: There is no evidence of free fluid within the pelvis in the patient was not tender during the exam.     Impression:  1. No significant abnormalities are noted.  See above comments.    Myco/Urea One Swab 2023: +UU    LMP: 2023   Frequency: monthly    Cycle Length: 5-6 days   Flow: heavy  Intermenstrual Bleeding: Occasionally  Postcoital Bleeding: No  Dysmenorrhea: Yes, Severe  Sexually Active: Yes   Dyspareunia: No  Contraception: Tubal ligation   H/o STI: TV  Last pap: 21 - Negative w/ Neg HPV  H/o Abn        Current Outpatient Medications:     albuterol (PROVENTIL/VENTOLIN HFA) 90 mcg/actuation inhaler, Inhale 2 puffs into the lungs every 6 (six) hours as needed., Disp: , Rfl:     atorvastatin (LIPITOR) 20 MG tablet, Take 20 mg by mouth once daily., Disp: , Rfl:     cyclobenzaprine (FLEXERIL) 5 MG tablet, Take 1 tablet (5 mg total) by mouth 3 (three) times daily as needed for Muscle spasms., Disp: 90 tablet, Rfl: 0    fluticasone propionate (FLONASE) 50 mcg/actuation nasal spray, 1 spray by Each Nostril route 2 (two) times daily., Disp: , Rfl:     gabapentin (NEURONTIN) 600 MG tablet, Take 1 tablet (600 mg total) by mouth 3 (three) times daily., Disp: 90 tablet, Rfl: 11    hydroCHLOROthiazide (HYDRODIURIL) 12.5 MG Tab, Take 12.5 mg by mouth., Disp: , Rfl:      mg tablet, Take 1 tablet (600 mg total) by mouth every 6 (six) hours as needed  "for Pain., Disp: 30 tablet, Rfl: 3    nitroGLYCERIN (NITROSTAT) 0.4 MG SL tablet, Place under the tongue., Disp: , Rfl:     omeprazole (PRILOSEC) 20 MG capsule, 20 mg 2 (two) times a day., Disp: , Rfl:     sumatriptan (IMITREX) 25 MG Tab, Take 25 mg by mouth daily as needed., Disp: , Rfl:     UNABLE TO FIND, Apply 1 mL topically 3 (three) times daily as needed. medication name: Compound Medication (Diclofenac and Lidocaine), Disp: , Rfl:     valACYclovir (VALTREX) 1000 MG tablet, Take 1 tablet (1,000 mg total) by mouth every 8 (eight) hours while awake. for 7 days, Disp: 14 tablet, Rfl: 0    Review of patient's allergies indicates:   Allergen Reactions    Tramadol Itching       Social History     Tobacco Use    Smoking status: Every Day     Packs/day: 0.50     Years: 20.00     Pack years: 10.00     Types: Cigarettes     Start date: 2003     Passive exposure: Never   Substance Use Topics    Alcohol use: Not Currently    Drug use: Not Currently     Types: "Crack" cocaine, Hydrocodone, Marijuana, Methamphetamines, Oxycodone     Comment: 5 years clean       Review of Systems:   General/Constitutional: Chills denies. Fatigue/weakness denies. Fever denies. Night sweats denies. Hot flashes admits   Respiratory: Cough denies. Hemoptysis denies. SOB denies. Sputum production denies. Wheezing denies .   Cardiovascular: Chest pain denies . Dizziness denies. Palpitations denies. Swelling in hands/feet denies    Gastrointestinal: Abdominal pain denies. Blood in stool denies. Constipation denies. Diarrhea denies. Heartburn denies. Nausea denies. Vomiting denies    Genitourinary: Incontinence denies. Blood in urine denies. Frequent urination denies. Painful urination denies. Urinary urgency denies. Nocturia denies    Gynecologic: Irregular menses denies. Heavy bleeding denies. Painful menses denies. Vaginal discharge denies. Vaginal odor denies. Vaginal itching denies. Vaginal lesion denies. Pelvic pain admits. Decreased libido " denies. Vulvar lesion denies. Prolapse of genital organs denies. Painful intercourse denies. Postcoital bleeding denies    Psychiatric: Depression denies. Anxiety denies       Physical Exam:   There were no vitals filed for this visit.    There is no height or weight on file to calculate BMI.    Constitutional:       Appearance: She is well-developed  Abdominal:       General: Abdomen is flat.  Neurological:        Mental Status: She is alert and oriented to person, place and time.   Psychiatric:       Attention and Perception: Attention normal.       Mood and Affect: Mood normal. Mood is not anxious or depressed.       Assessment:     Patient Active Problem List   Diagnosis    Other chronic pain    Chronic rhinosinusitis    Family history of malignant neoplasm of colon    Gastroesophageal reflux disease    Hypertension    Iron deficiency anemia    Migraine headache    Mixed hyperlipidemia    Obesity    Restless legs syndrome    Tobacco user    Nontraumatic rupture of tendon of left thumb    Thrombocytosis    Peripheral edema    History of substance abuse    Heel spur, left       Health Maintenance Due   Topic Date Due    Hepatitis C Screening  Never done    Lipid Panel  Never done    HIV Screening  Never done    Hemoglobin A1c (Diabetic Prevention Screening)  Never done    Colorectal Cancer Screening  Never done    Pneumococcal Vaccines (Age 0-64) (2 - PPSV23 if available, else PCV20) 08/08/2017    COVID-19 Vaccine (4 - Moderna series) 03/11/2022    Shingles Vaccine (2 of 2) 03/29/2023     Health Maintenance Topics with due status: Not Due       Topic Last Completion Date    TETANUS VACCINE 07/05/2021    Influenza Vaccine 02/01/2023    Cervical Cancer Screening 07/13/2023    Mammogram 07/18/2023           Plan:    Diagnoses and all orders for this visit:    Pelvic pain  U/s results discussed with patient  - Educated    - NSAIDs, heating pad, warm bath    - Pain precautions    Carrier of ureaplasma  urealyticum  Doxycycline as prescribed - begin today  LORRAINE 6 weeks

## 2023-07-27 ENCOUNTER — OFFICE VISIT (OUTPATIENT)
Dept: OBSTETRICS AND GYNECOLOGY | Facility: CLINIC | Age: 53
End: 2023-07-27
Payer: MEDICAID

## 2023-07-27 VITALS
HEIGHT: 62 IN | TEMPERATURE: 98 F | DIASTOLIC BLOOD PRESSURE: 74 MMHG | SYSTOLIC BLOOD PRESSURE: 114 MMHG | BODY MASS INDEX: 38.28 KG/M2 | WEIGHT: 208 LBS

## 2023-07-27 DIAGNOSIS — R10.2 PELVIC PAIN: Primary | ICD-10-CM

## 2023-07-27 DIAGNOSIS — Z22.39 CARRIER OF UREAPLASMA UREALYTICUM: ICD-10-CM

## 2023-07-27 PROCEDURE — 99213 OFFICE O/P EST LOW 20 MIN: CPT | Mod: ,,, | Performed by: NURSE PRACTITIONER

## 2023-07-27 PROCEDURE — 3078F PR MOST RECENT DIASTOLIC BLOOD PRESSURE < 80 MM HG: ICD-10-PCS | Mod: CPTII,,, | Performed by: NURSE PRACTITIONER

## 2023-07-27 PROCEDURE — 1159F PR MEDICATION LIST DOCUMENTED IN MEDICAL RECORD: ICD-10-PCS | Mod: CPTII,,, | Performed by: NURSE PRACTITIONER

## 2023-07-27 PROCEDURE — 3078F DIAST BP <80 MM HG: CPT | Mod: CPTII,,, | Performed by: NURSE PRACTITIONER

## 2023-07-27 PROCEDURE — 4010F ACE/ARB THERAPY RXD/TAKEN: CPT | Mod: CPTII,,, | Performed by: NURSE PRACTITIONER

## 2023-07-27 PROCEDURE — 3074F PR MOST RECENT SYSTOLIC BLOOD PRESSURE < 130 MM HG: ICD-10-PCS | Mod: CPTII,,, | Performed by: NURSE PRACTITIONER

## 2023-07-27 PROCEDURE — 99213 PR OFFICE/OUTPT VISIT, EST, LEVL III, 20-29 MIN: ICD-10-PCS | Mod: ,,, | Performed by: NURSE PRACTITIONER

## 2023-07-27 PROCEDURE — 1160F PR REVIEW ALL MEDS BY PRESCRIBER/CLIN PHARMACIST DOCUMENTED: ICD-10-PCS | Mod: CPTII,,, | Performed by: NURSE PRACTITIONER

## 2023-07-27 PROCEDURE — 4010F PR ACE/ARB THEARPY RXD/TAKEN: ICD-10-PCS | Mod: CPTII,,, | Performed by: NURSE PRACTITIONER

## 2023-07-27 PROCEDURE — 1159F MED LIST DOCD IN RCRD: CPT | Mod: CPTII,,, | Performed by: NURSE PRACTITIONER

## 2023-07-27 PROCEDURE — 1160F RVW MEDS BY RX/DR IN RCRD: CPT | Mod: CPTII,,, | Performed by: NURSE PRACTITIONER

## 2023-07-27 PROCEDURE — 3008F BODY MASS INDEX DOCD: CPT | Mod: CPTII,,, | Performed by: NURSE PRACTITIONER

## 2023-07-27 PROCEDURE — 3008F PR BODY MASS INDEX (BMI) DOCUMENTED: ICD-10-PCS | Mod: CPTII,,, | Performed by: NURSE PRACTITIONER

## 2023-07-27 PROCEDURE — 3074F SYST BP LT 130 MM HG: CPT | Mod: CPTII,,, | Performed by: NURSE PRACTITIONER

## 2023-08-03 RX ORDER — CYCLOBENZAPRINE HCL 5 MG
5 TABLET ORAL 3 TIMES DAILY PRN
Qty: 90 TABLET | Refills: 0 | Status: SHIPPED | OUTPATIENT
Start: 2023-08-03 | End: 2023-08-18 | Stop reason: SDUPTHER

## 2023-08-18 DIAGNOSIS — S46.811A STRAIN OF RIGHT TRAPEZIUS MUSCLE, INITIAL ENCOUNTER: Primary | ICD-10-CM

## 2023-08-21 RX ORDER — CYCLOBENZAPRINE HCL 5 MG
5 TABLET ORAL 3 TIMES DAILY PRN
Qty: 90 TABLET | Refills: 0 | Status: SHIPPED | OUTPATIENT
Start: 2023-08-21 | End: 2023-10-04 | Stop reason: ALTCHOICE

## 2023-08-24 PROCEDURE — 80053 COMPREHEN METABOLIC PANEL: CPT | Performed by: NURSE PRACTITIONER

## 2023-08-24 PROCEDURE — 85025 COMPLETE CBC W/AUTO DIFF WBC: CPT | Performed by: NURSE PRACTITIONER

## 2023-08-24 PROCEDURE — 83036 HEMOGLOBIN GLYCOSYLATED A1C: CPT | Performed by: NURSE PRACTITIONER

## 2023-08-24 PROCEDURE — 80061 LIPID PANEL: CPT | Performed by: NURSE PRACTITIONER

## 2023-08-24 PROCEDURE — 84443 ASSAY THYROID STIM HORMONE: CPT | Performed by: NURSE PRACTITIONER

## 2023-08-29 ENCOUNTER — OFFICE VISIT (OUTPATIENT)
Dept: FAMILY MEDICINE | Facility: CLINIC | Age: 53
End: 2023-08-29
Payer: MEDICAID

## 2023-08-29 VITALS
WEIGHT: 206.81 LBS | BODY MASS INDEX: 38.06 KG/M2 | HEART RATE: 95 BPM | HEIGHT: 62 IN | DIASTOLIC BLOOD PRESSURE: 70 MMHG | TEMPERATURE: 97 F | SYSTOLIC BLOOD PRESSURE: 124 MMHG | OXYGEN SATURATION: 97 %

## 2023-08-29 DIAGNOSIS — M79.18 CHRONIC MUSCULOSKELETAL PAIN: ICD-10-CM

## 2023-08-29 DIAGNOSIS — K21.9 GASTROESOPHAGEAL REFLUX DISEASE WITHOUT ESOPHAGITIS: ICD-10-CM

## 2023-08-29 DIAGNOSIS — I10 PRIMARY HYPERTENSION: ICD-10-CM

## 2023-08-29 DIAGNOSIS — G89.29 CHRONIC MUSCULOSKELETAL PAIN: ICD-10-CM

## 2023-08-29 DIAGNOSIS — Z00.00 ROUTINE GENERAL MEDICAL EXAMINATION AT A HEALTH CARE FACILITY: Primary | ICD-10-CM

## 2023-08-29 DIAGNOSIS — M51.16 LUMBAR DISC DISEASE WITH RADICULOPATHY: ICD-10-CM

## 2023-08-29 DIAGNOSIS — R73.01 IFG (IMPAIRED FASTING GLUCOSE): ICD-10-CM

## 2023-08-29 DIAGNOSIS — E78.2 MIXED HYPERLIPIDEMIA: ICD-10-CM

## 2023-08-29 DIAGNOSIS — Z72.0 TOBACCO USER: ICD-10-CM

## 2023-08-29 DIAGNOSIS — D50.8 IRON DEFICIENCY ANEMIA SECONDARY TO INADEQUATE DIETARY IRON INTAKE: ICD-10-CM

## 2023-08-29 PROBLEM — E66.812 CLASS 2 OBESITY DUE TO EXCESS CALORIES WITHOUT SERIOUS COMORBIDITY WITH BODY MASS INDEX (BMI) OF 37.0 TO 37.9 IN ADULT: Status: ACTIVE | Noted: 2023-02-01

## 2023-08-29 PROBLEM — E66.09 CLASS 2 OBESITY DUE TO EXCESS CALORIES WITHOUT SERIOUS COMORBIDITY WITH BODY MASS INDEX (BMI) OF 37.0 TO 37.9 IN ADULT: Status: ACTIVE | Noted: 2023-02-01

## 2023-08-29 PROCEDURE — 1159F MED LIST DOCD IN RCRD: CPT | Mod: CPTII,,, | Performed by: NURSE PRACTITIONER

## 2023-08-29 PROCEDURE — 3008F BODY MASS INDEX DOCD: CPT | Mod: CPTII,,, | Performed by: NURSE PRACTITIONER

## 2023-08-29 PROCEDURE — 3008F PR BODY MASS INDEX (BMI) DOCUMENTED: ICD-10-PCS | Mod: CPTII,,, | Performed by: NURSE PRACTITIONER

## 2023-08-29 PROCEDURE — 3044F PR MOST RECENT HEMOGLOBIN A1C LEVEL <7.0%: ICD-10-PCS | Mod: CPTII,,, | Performed by: NURSE PRACTITIONER

## 2023-08-29 PROCEDURE — 3074F PR MOST RECENT SYSTOLIC BLOOD PRESSURE < 130 MM HG: ICD-10-PCS | Mod: CPTII,,, | Performed by: NURSE PRACTITIONER

## 2023-08-29 PROCEDURE — 1159F PR MEDICATION LIST DOCUMENTED IN MEDICAL RECORD: ICD-10-PCS | Mod: CPTII,,, | Performed by: NURSE PRACTITIONER

## 2023-08-29 PROCEDURE — 99396 PREV VISIT EST AGE 40-64: CPT | Mod: ,,, | Performed by: NURSE PRACTITIONER

## 2023-08-29 PROCEDURE — 4010F ACE/ARB THERAPY RXD/TAKEN: CPT | Mod: CPTII,,, | Performed by: NURSE PRACTITIONER

## 2023-08-29 PROCEDURE — 3078F DIAST BP <80 MM HG: CPT | Mod: CPTII,,, | Performed by: NURSE PRACTITIONER

## 2023-08-29 PROCEDURE — 3074F SYST BP LT 130 MM HG: CPT | Mod: CPTII,,, | Performed by: NURSE PRACTITIONER

## 2023-08-29 PROCEDURE — 4010F PR ACE/ARB THEARPY RXD/TAKEN: ICD-10-PCS | Mod: CPTII,,, | Performed by: NURSE PRACTITIONER

## 2023-08-29 PROCEDURE — 3078F PR MOST RECENT DIASTOLIC BLOOD PRESSURE < 80 MM HG: ICD-10-PCS | Mod: CPTII,,, | Performed by: NURSE PRACTITIONER

## 2023-08-29 PROCEDURE — 3044F HG A1C LEVEL LT 7.0%: CPT | Mod: CPTII,,, | Performed by: NURSE PRACTITIONER

## 2023-08-29 PROCEDURE — 99396 PR PREVENTIVE VISIT,EST,40-64: ICD-10-PCS | Mod: ,,, | Performed by: NURSE PRACTITIONER

## 2023-08-29 RX ORDER — FERROUS SULFATE 325(65) MG
325 TABLET ORAL EVERY OTHER DAY
Qty: 15 TABLET | Refills: 11 | Status: SHIPPED | OUTPATIENT
Start: 2023-08-29

## 2023-08-29 RX ORDER — DICLOFENAC SODIUM 10 MG/G
GEL TOPICAL
COMMUNITY
Start: 2023-08-11

## 2023-08-29 RX ORDER — DULOXETIN HYDROCHLORIDE 30 MG/1
CAPSULE, DELAYED RELEASE ORAL
Qty: 60 CAPSULE | Refills: 0 | Status: SHIPPED | OUTPATIENT
Start: 2023-08-29 | End: 2023-10-04 | Stop reason: SDUPTHER

## 2023-08-29 RX ORDER — DULOXETIN HYDROCHLORIDE 30 MG/1
CAPSULE, DELAYED RELEASE ORAL
Qty: 30 CAPSULE | Refills: 11 | Status: SHIPPED | OUTPATIENT
Start: 2023-08-29 | End: 2023-08-29

## 2023-08-29 NOTE — ASSESSMENT & PLAN NOTE
Low Sodium Diet (DASH Diet - Less than 2 grams of sodium per day).  Monitor blood pressure daily and log. Report consistent numbers greater than 140/90.  Maintain healthy weight with goal BMI <30. Exercise 30 minutes per day, 5 days per week.  Smoking cessation encouraged to aid in BP reduction.

## 2023-08-29 NOTE — ASSESSMENT & PLAN NOTE
LDL Goal: 100  Educated on dietary modifications. Follow a low cholesterol, low saturated fat diet with less that 200mg of cholesterol a day.  Avoid fried foods and high saturated fats.  Increase dietary fiber.  Regular exercise can reduce LDL (bad cholesterol) and raise HDL (good cholesterol). Encourage physical activity 5 times per week for 30 minutes per day.

## 2023-08-29 NOTE — PROGRESS NOTES
Patient ID: Cynthia Raymond  : 1970    Chief Complaint: Annual Exam (Wellness/labs)    Allergies: Patient is allergic to tramadol.     History of Present Illness:  The patient is a 52 y.o. White female who presents to clinic for annual wellness visit.      Here for wellness. Doing well in general except that she is having trouble with her lower back pain again since she went back to work. She is not doing any lifting or anything strenuous. While she was off for the 6 months it did nto bother her. She is on Gabapentin but it does not seem to be helping anymore. She is having radicular pain to the RT lower extremity.  No loss of bowel or bladder function, no loss of sensory or motor function.    A1c 5.9%; this is new. She does have a family history of diabetes.     Long history anemia, iron deficiency.  She has never tried taking iron supplementation in the past.      She was sent for colonoscopy and scheduled with Dr Saldana but was told she needed Cardiology clearance first. Saw cardiology and got cleared so she will call back to reschedule the colonoscopy.        Past Medical History:  has a past medical history of GERD (gastroesophageal reflux disease), Hypercholesteremia, Hypertension, MISAEL (iron deficiency anemia), Migraine headache, PID (pelvic inflammatory disease), RLS (restless legs syndrome), STD (sexually transmitted disease), Substance abuse, and Tobacco use.    Surgical History:  has a past surgical history that includes  section (); Shoulder arthroscopy (Right); Forearm surgery (Right); Cholecystectomy (2020); Knee arthroscopy (Left);  section ();  section with tubal ligation (); and Dilation and curettage of uterus ().    Family History: family history includes Arthritis in her father; Asthma in her father; Cancer in her mother; Colon cancer (age of onset: 72) in her mother; Depression in her sister; Diabetes in her mother; Heart disease in her  "father; Hypertension in her father.    Social History:  reports that she has been smoking cigarettes. She started smoking about 20 years ago. She has a 10.3 pack-year smoking history. She has never been exposed to tobacco smoke. She does not have any smokeless tobacco history on file. She reports that she does not currently use alcohol. She reports that she does not currently use drugs after having used the following drugs: "Crack" cocaine, Hydrocodone, Marijuana, Methamphetamines, and Oxycodone.    Care Team: Patient Care Team:  Margarita Richards FNP-C as PCP - General (Family Medicine)  Evan Mahajan OD as Consulting Physician (Optometry)  Tong Rodrgiuez MD (Orthopedic Surgery)  Parisa Mendoza NP as Nurse Practitioner (Obstetrics and Gynecology)  Hugh Quispe MD as Consulting Physician (Cardiology)     Current Medications:  Current Outpatient Medications   Medication Instructions    albuterol (PROVENTIL/VENTOLIN HFA) 90 mcg/actuation inhaler 2 puffs, Inhalation, Every 6 hours PRN    atorvastatin (LIPITOR) 20 mg, Oral, Daily    cyclobenzaprine (FLEXERIL) 5 mg, Oral, 3 times daily PRN    diclofenac sodium (VOLTAREN) 1 % Gel Topical (Top)    DULoxetine (CYMBALTA) 30 MG capsule Take 1 cap PO daily x 1 week, then increase to 2 caps daily    ferrous sulfate (FEOSOL) 325 mg, Oral, Every other day    fluticasone propionate (FLONASE) 50 mcg/actuation nasal spray 1 spray, Each Nostril, 2 times daily    gabapentin (NEURONTIN) 600 mg, Oral, 3 times daily    GABAPENTIN 4% CREAM APPLY 1 GRAM 3-4 TIMES DAILY FOR TREATMENT OF PAIN    hydroCHLOROthiazide (HYDRODIURIL) 12.5 mg, Oral     mg, Oral, Every 6 hours PRN    nitroGLYCERIN (NITROSTAT) 0.4 MG SL tablet Sublingual    omeprazole (PRILOSEC) 20 mg, 2 times daily    sumatriptan (IMITREX) 25 mg, Oral, Daily PRN    UNABLE TO FIND 1 mL, Topical (Top), 3 times daily PRN, medication name: Compound Medication (Diclofenac and Lidocaine)       Review " "of Systems   Constitutional:  Negative for activity change, appetite change, fatigue and unexpected weight change.   HENT:  Negative for ear pain and hearing loss.    Eyes:  Negative for visual disturbance.   Respiratory:  Negative for apnea, cough, chest tightness, shortness of breath and wheezing.    Cardiovascular:  Negative for chest pain, palpitations, leg swelling and claudication.   Gastrointestinal:  Negative for abdominal pain, anal bleeding, blood in stool, change in bowel habit, nausea, vomiting, reflux and change in bowel habit.   Endocrine: Negative for cold intolerance, heat intolerance, polydipsia, polyphagia and polyuria.   Genitourinary:  Negative for dysuria, frequency, hematuria and urgency.   Musculoskeletal:  Negative for arthralgias.   Integumentary:  Negative for rash and mole/lesion.   Allergic/Immunologic: Negative for environmental allergies.   Neurological:  Negative for dizziness, headaches and memory loss.          Visit Vitals  /70 (BP Location: Left arm)   Pulse 95   Temp 97.2 °F (36.2 °C) (Temporal)   Ht 5' 2" (1.575 m)   Wt 93.8 kg (206 lb 12.8 oz)   SpO2 97%   BMI 37.82 kg/m²       Physical Exam  Vitals reviewed.   Constitutional:       Appearance: Normal appearance. She is normal weight.   HENT:      Right Ear: Tympanic membrane, ear canal and external ear normal.      Left Ear: Tympanic membrane, ear canal and external ear normal.      Nose: Nose normal.      Mouth/Throat:      Mouth: Mucous membranes are moist.      Pharynx: Oropharynx is clear.   Eyes:      Conjunctiva/sclera: Conjunctivae normal.   Cardiovascular:      Rate and Rhythm: Normal rate and regular rhythm.      Pulses: Normal pulses.      Heart sounds: Normal heart sounds.   Pulmonary:      Effort: Pulmonary effort is normal.      Breath sounds: Normal breath sounds.   Abdominal:      General: Bowel sounds are normal.      Palpations: Abdomen is soft.   Musculoskeletal:         General: Normal range of motion. "      Cervical back: Normal range of motion and neck supple.   Skin:     General: Skin is warm and dry.      Capillary Refill: Capillary refill takes less than 2 seconds.   Neurological:      Mental Status: She is alert and oriented to person, place, and time.   Psychiatric:         Mood and Affect: Mood normal.         Behavior: Behavior normal.          Labs Reviewed:  Chemistry:  Lab Results   Component Value Date     08/24/2023    K 3.5 08/24/2023    CHLORIDE 99 08/24/2023    BUN 12.0 08/24/2023    CREATININE 0.72 08/24/2023    EGFRNORACEVR >90 08/24/2023    GLUCOSE 106 08/24/2023    CALCIUM 9.3 08/24/2023    ALKPHOS 94 08/24/2023    LABPROT 6.7 08/24/2023    ALBUMIN 3.9 08/24/2023    AST 29 08/24/2023    ALT 30 08/24/2023    TSH 1.450 08/24/2023        Lab Results   Component Value Date    HGBA1C 5.9 08/24/2023        Hematology:  Lab Results   Component Value Date    WBC 6.38 08/24/2023    RBC 5.04 08/24/2023    HGB 11.4 (L) 08/24/2023    HCT 35.9 (L) 08/24/2023    MCV 71.2 (L) 08/24/2023    MCH 22.6 (L) 08/24/2023    MCHC 31.8 08/24/2023    RDW 18.8 (H) 08/24/2023     (H) 08/24/2023    MPV 9.8 08/24/2023       Lipid Panel:  Lab Results   Component Value Date    CHOL 184 08/24/2023    HDL 40 08/24/2023    DLDL 108.3 (H) 08/24/2023    TRIG 170 08/24/2023        Assessment & Plan:  1. Routine general medical examination at a health care facility    2. Primary hypertension  Overview:  On Losartan 25 mg daily and HCTZ 25 mg daily.     Assessment & Plan:  Low Sodium Diet (DASH Diet - Less than 2 grams of sodium per day).  Monitor blood pressure daily and log. Report consistent numbers greater than 140/90.  Maintain healthy weight with goal BMI <30. Exercise 30 minutes per day, 5 days per week.  Smoking cessation encouraged to aid in BP reduction.        3. Mixed hyperlipidemia  Overview:  Lipid Panel:  Lab Results   Component Value Date    CHOL 184 08/24/2023    HDL 40 08/24/2023    DLDL 108.3 (H)  08/24/2023    TRIG 170 08/24/2023      On Atorvastatin 20 mg daily.     Assessment & Plan:  LDL Goal: 100  Educated on dietary modifications. Follow a low cholesterol, low saturated fat diet with less that 200mg of cholesterol a day.  Avoid fried foods and high saturated fats.  Increase dietary fiber.  Regular exercise can reduce LDL (bad cholesterol) and raise HDL (good cholesterol). Encourage physical activity 5 times per week for 30 minutes per day.       4. IFG (impaired fasting glucose)  Overview:  A1c 5.9%    Assessment & Plan:  Discussed lifestyle modifications at this point.      5. Iron deficiency anemia secondary to inadequate dietary iron intake  Assessment & Plan:  Start ferrous sulfate to 325 mg q.o.d.    Orders:  -     ferrous sulfate (FEOSOL) 325 mg (65 mg iron) Tab tablet; Take 1 tablet (325 mg total) by mouth every other day.  Dispense: 15 tablet; Refill: 11    6. Gastroesophageal reflux disease without esophagitis  Overview:  On Omeprazole 20 mg BID.      7. Lumbar disc disease with radiculopathy  Overview:  On Gabapentin 600 mg TID    Assessment & Plan:  Add Cymbalta 30 mg daily x 1 week, then increase to 60 mg daily      8. Chronic musculoskeletal pain  -     Discontinue: DULoxetine (CYMBALTA) 30 MG capsule; Take 1 cap PO daily x 1 week, then increase to 2 caps daily  Dispense: 30 capsule; Refill: 11  -     DULoxetine (CYMBALTA) 30 MG capsule; Take 1 cap PO daily x 1 week, then increase to 2 caps daily  Dispense: 60 capsule; Refill: 0    9. Tobacco user  Overview:  1/2 PPD    Assessment & Plan:  Smoking cessation encouraged.           Cervical Cancer Screening- done  Breast Cancer Screening- done   Colon Cancer Screening -  she will reschedule with Saldana  Eye Exam- Recommend annually.  Dental Exam- Recommend biannually.    Vaccinations:  Immunization History   Administered Date(s) Administered    COVID-19, MRNA, LN-S, PF (MODERNA FULL 0.5 ML DOSE) 04/07/2021, 05/05/2021, 01/14/2022    Influenza  - Quadrivalent 12/23/2020, 11/24/2021    Influenza - Quadrivalent - PF *Preferred* (6 months and older) 10/10/2017, 12/23/2020, 11/24/2021, 02/01/2023    Influenza - Trivalent - PF (ADULT) 10/10/2017, 11/18/2019    Pneumococcal Conjugate - 13 Valent 06/13/2017    Tdap 11/11/2020, 07/05/2021    Zoster Recombinant 02/01/2023       Future Appointments   Date Time Provider Department Center   9/11/2023 10:20 AM Parisa Mendoza, NP [facility]   10/4/2023  8:00 AM [facility]   8/27/2024  8:10 AM LAB, [facility] LABORATORY DRAW STATION [facility] SA[facility]RA [facility]   9/3/2024  9:00 AM Margarita Richards FNP-C [facility]       Follow up in about 1 year (around 8/29/2024) for wellness, labs prior PLUS 1 month f/u on back pain & new IFG. Call sooner if needed.    GENOVEVA Harding    Lab Frequency Next Occurrence   Ambulatory referral/consult to Orthopedics Once 05/11/2023   Ambulatory referral/consult to Family Practice Once 06/15/2023   Path Review, Peripheral Smear Once 06/08/2023   Colonoscopy Once 06/26/2023   Ambulatory referral/consult to Smoking Cessation Program Once 07/20/2023

## 2023-08-30 ENCOUNTER — TELEPHONE (OUTPATIENT)
Dept: FAMILY MEDICINE | Facility: CLINIC | Age: 53
End: 2023-08-30
Payer: MEDICAID

## 2023-08-30 NOTE — TELEPHONE ENCOUNTER
I called Geoff and spoke to Lali. She stated that the patient's insurance will no pay for her to take to caps daily but will pay for her to take one 60mg capsule of Cymbalta please advise.

## 2023-08-31 RX ORDER — DULOXETIN HYDROCHLORIDE 60 MG/1
60 CAPSULE, DELAYED RELEASE ORAL DAILY
Qty: 23 CAPSULE | Refills: 0 | Status: SHIPPED | OUTPATIENT
Start: 2023-08-31 | End: 2023-09-12

## 2023-08-31 RX ORDER — DULOXETIN HYDROCHLORIDE 30 MG/1
CAPSULE, DELAYED RELEASE ORAL
Qty: 7 CAPSULE | Refills: 0 | Status: SHIPPED | OUTPATIENT
Start: 2023-08-31 | End: 2023-09-12

## 2023-08-31 NOTE — TELEPHONE ENCOUNTER
I will adjust the prescription to make it work.   I'll send 7 caps of the 30 mg and then send 23 of the 60 mg caps

## 2023-09-01 DIAGNOSIS — E78.2 MIXED HYPERLIPIDEMIA: Primary | ICD-10-CM

## 2023-09-01 RX ORDER — ATORVASTATIN CALCIUM 20 MG/1
20 TABLET, FILM COATED ORAL DAILY
Qty: 30 TABLET | Refills: 11 | Status: SHIPPED | OUTPATIENT
Start: 2023-09-01

## 2023-09-11 ENCOUNTER — TELEPHONE (OUTPATIENT)
Dept: FAMILY MEDICINE | Facility: CLINIC | Age: 53
End: 2023-09-11
Payer: MEDICAID

## 2023-09-11 DIAGNOSIS — G43.909 MIGRAINE WITHOUT STATUS MIGRAINOSUS, NOT INTRACTABLE, UNSPECIFIED MIGRAINE TYPE: ICD-10-CM

## 2023-09-11 DIAGNOSIS — M51.16 LUMBAR DISC DISEASE WITH RADICULOPATHY: Primary | ICD-10-CM

## 2023-09-12 RX ORDER — DULOXETIN HYDROCHLORIDE 30 MG/1
30 CAPSULE, DELAYED RELEASE ORAL DAILY
Qty: 30 CAPSULE | Refills: 5 | Status: SHIPPED | OUTPATIENT
Start: 2023-09-12 | End: 2024-03-22 | Stop reason: SDUPTHER

## 2023-09-12 RX ORDER — SUMATRIPTAN SUCCINATE 25 MG/1
25 TABLET ORAL DAILY PRN
Qty: 9 TABLET | Refills: 1 | Status: SHIPPED | OUTPATIENT
Start: 2023-09-12

## 2023-10-04 ENCOUNTER — TELEPHONE (OUTPATIENT)
Dept: FAMILY MEDICINE | Facility: CLINIC | Age: 53
End: 2023-10-04

## 2023-10-04 ENCOUNTER — OFFICE VISIT (OUTPATIENT)
Dept: FAMILY MEDICINE | Facility: CLINIC | Age: 53
End: 2023-10-04
Payer: MEDICAID

## 2023-10-04 VITALS
HEIGHT: 62 IN | OXYGEN SATURATION: 99 % | TEMPERATURE: 98 F | DIASTOLIC BLOOD PRESSURE: 68 MMHG | WEIGHT: 200.63 LBS | BODY MASS INDEX: 36.92 KG/M2 | HEART RATE: 67 BPM | SYSTOLIC BLOOD PRESSURE: 118 MMHG

## 2023-10-04 DIAGNOSIS — M51.16 LUMBAR DISC DISEASE WITH RADICULOPATHY: Primary | ICD-10-CM

## 2023-10-04 DIAGNOSIS — E66.09 CLASS 2 OBESITY DUE TO EXCESS CALORIES WITHOUT SERIOUS COMORBIDITY WITH BODY MASS INDEX (BMI) OF 36.0 TO 36.9 IN ADULT: ICD-10-CM

## 2023-10-04 DIAGNOSIS — K21.9 GASTROESOPHAGEAL REFLUX DISEASE WITHOUT ESOPHAGITIS: Primary | ICD-10-CM

## 2023-10-04 DIAGNOSIS — R73.01 IFG (IMPAIRED FASTING GLUCOSE): ICD-10-CM

## 2023-10-04 DIAGNOSIS — D50.8 IRON DEFICIENCY ANEMIA SECONDARY TO INADEQUATE DIETARY IRON INTAKE: ICD-10-CM

## 2023-10-04 PROCEDURE — 99213 OFFICE O/P EST LOW 20 MIN: CPT | Mod: ,,, | Performed by: NURSE PRACTITIONER

## 2023-10-04 PROCEDURE — 3008F BODY MASS INDEX DOCD: CPT | Mod: CPTII,,, | Performed by: NURSE PRACTITIONER

## 2023-10-04 PROCEDURE — 1159F MED LIST DOCD IN RCRD: CPT | Mod: CPTII,,, | Performed by: NURSE PRACTITIONER

## 2023-10-04 PROCEDURE — 3078F DIAST BP <80 MM HG: CPT | Mod: CPTII,,, | Performed by: NURSE PRACTITIONER

## 2023-10-04 PROCEDURE — 3044F HG A1C LEVEL LT 7.0%: CPT | Mod: CPTII,,, | Performed by: NURSE PRACTITIONER

## 2023-10-04 PROCEDURE — 3008F PR BODY MASS INDEX (BMI) DOCUMENTED: ICD-10-PCS | Mod: CPTII,,, | Performed by: NURSE PRACTITIONER

## 2023-10-04 PROCEDURE — 3044F PR MOST RECENT HEMOGLOBIN A1C LEVEL <7.0%: ICD-10-PCS | Mod: CPTII,,, | Performed by: NURSE PRACTITIONER

## 2023-10-04 PROCEDURE — 3078F PR MOST RECENT DIASTOLIC BLOOD PRESSURE < 80 MM HG: ICD-10-PCS | Mod: CPTII,,, | Performed by: NURSE PRACTITIONER

## 2023-10-04 PROCEDURE — 1159F PR MEDICATION LIST DOCUMENTED IN MEDICAL RECORD: ICD-10-PCS | Mod: CPTII,,, | Performed by: NURSE PRACTITIONER

## 2023-10-04 PROCEDURE — 1160F PR REVIEW ALL MEDS BY PRESCRIBER/CLIN PHARMACIST DOCUMENTED: ICD-10-PCS | Mod: CPTII,,, | Performed by: NURSE PRACTITIONER

## 2023-10-04 PROCEDURE — 1160F RVW MEDS BY RX/DR IN RCRD: CPT | Mod: CPTII,,, | Performed by: NURSE PRACTITIONER

## 2023-10-04 PROCEDURE — 3074F PR MOST RECENT SYSTOLIC BLOOD PRESSURE < 130 MM HG: ICD-10-PCS | Mod: CPTII,,, | Performed by: NURSE PRACTITIONER

## 2023-10-04 PROCEDURE — 4010F PR ACE/ARB THEARPY RXD/TAKEN: ICD-10-PCS | Mod: CPTII,,, | Performed by: NURSE PRACTITIONER

## 2023-10-04 PROCEDURE — 4010F ACE/ARB THERAPY RXD/TAKEN: CPT | Mod: CPTII,,, | Performed by: NURSE PRACTITIONER

## 2023-10-04 PROCEDURE — 3074F SYST BP LT 130 MM HG: CPT | Mod: CPTII,,, | Performed by: NURSE PRACTITIONER

## 2023-10-04 PROCEDURE — 99213 PR OFFICE/OUTPT VISIT, EST, LEVL III, 20-29 MIN: ICD-10-PCS | Mod: ,,, | Performed by: NURSE PRACTITIONER

## 2023-10-04 RX ORDER — TIZANIDINE 4 MG/1
4 TABLET ORAL 2 TIMES DAILY PRN
Qty: 60 TABLET | Refills: 5 | Status: SHIPPED | OUTPATIENT
Start: 2023-10-04

## 2023-10-04 RX ORDER — OMEPRAZOLE 20 MG/1
20 CAPSULE, DELAYED RELEASE ORAL 2 TIMES DAILY
Qty: 60 CAPSULE | Refills: 11 | Status: SHIPPED | OUTPATIENT
Start: 2023-10-04

## 2023-10-04 NOTE — ASSESSMENT & PLAN NOTE

## 2023-10-04 NOTE — PROGRESS NOTES
"Patient ID: Cynthia Raymond  : 1970    Chief Complaint: Back Pain (1mth f/u back pain and new IFG)    Allergies: Patient is allergic to tramadol.     History of Present Illness:  The patient is a 52 y.o. White female who presents to clinic for follow up on Back Pain (1mth f/u back pain and new IFG)     At wellness visit we discussed her recent flare of her chronic lower back radicualr pain. Gabapentin no longer effective. Added Cymbalta and advised to titrate from 30mg to 60 mg. She was unable to tolerate the 60 mg dose, she felt that it made her feel confused. The low dose seems to be helping with her pain.     Also dx with IFG; recommended lifestyle modifications. Has lost 8# over the last 10 weeks. A1c 5.9%.    Started on iron supplement, takes every other day and not having any issue with constipation.     Social History:  reports that she has been smoking cigarettes. She started smoking about 20 years ago. She has a 10.4 pack-year smoking history. She has never been exposed to tobacco smoke. She does not have any smokeless tobacco history on file. She reports that she does not currently use alcohol. She reports that she does not currently use drugs after having used the following drugs: "Crack" cocaine, Hydrocodone, Marijuana, Methamphetamines, and Oxycodone.    Past Medical History:  has a past medical history of GERD (gastroesophageal reflux disease), Hypercholesteremia, Hypertension, MISAEL (iron deficiency anemia), Migraine headache, PID (pelvic inflammatory disease), RLS (restless legs syndrome), STD (sexually transmitted disease), Substance abuse, and Tobacco use.    Current Medications:  Current Outpatient Medications   Medication Instructions    albuterol (PROVENTIL/VENTOLIN HFA) 90 mcg/actuation inhaler 2 puffs, Inhalation, Every 6 hours PRN    atorvastatin (LIPITOR) 20 mg, Oral, Daily    diclofenac sodium (VOLTAREN) 1 % Gel Topical (Top)    DULoxetine (CYMBALTA) 30 mg, Oral, Daily, Take 1 " "capsule by mouth once daily.    ferrous sulfate (FEOSOL) 325 mg, Oral, Every other day    fluticasone propionate (FLONASE) 50 mcg/actuation nasal spray 1 spray, Each Nostril, 2 times daily    gabapentin (NEURONTIN) 600 mg, Oral, 3 times daily    GABAPENTIN 4% CREAM APPLY 1 GRAM 3-4 TIMES DAILY FOR TREATMENT OF PAIN    hydroCHLOROthiazide (HYDRODIURIL) 12.5 mg, Oral     mg, Oral, Every 6 hours PRN    nitroGLYCERIN (NITROSTAT) 0.4 MG SL tablet Sublingual    omeprazole (PRILOSEC) 20 mg, 2 times daily    sumatriptan (IMITREX) 25 mg, Oral, Daily PRN, Take 1 tablet by mouth daily as needed. May repeat dose after 2 hours up to a maximum of 200mg in 24 hours    tiZANidine (ZANAFLEX) 4 mg, Oral, 2 times daily PRN    UNABLE TO FIND 1 mL, Topical (Top), 3 times daily PRN, medication name: Compound Medication (Diclofenac and Lidocaine)       Review of Systems   See HPI    Visit Vitals  /68 (BP Location: Left arm)   Pulse 67   Temp 97.6 °F (36.4 °C) (Oral)   Ht 5' 2" (1.575 m)   Wt 91 kg (200 lb 9.6 oz)   SpO2 99%   BMI 36.69 kg/m²       Physical Exam  Vitals reviewed.   Constitutional:       Appearance: Normal appearance.   Cardiovascular:      Heart sounds: Normal heart sounds.   Pulmonary:      Breath sounds: Normal breath sounds.   Musculoskeletal:      Comments: Muscle tenderness along upper edge left shoulder blade; no decreased in ROM   Skin:     General: Skin is warm and dry.   Neurological:      Mental Status: She is oriented to person, place, and time.          Labs Reviewed:  Chemistry:  Lab Results   Component Value Date     08/24/2023    K 3.5 08/24/2023    CHLORIDE 99 08/24/2023    BUN 12.0 08/24/2023    CREATININE 0.72 08/24/2023    EGFRNORACEVR >90 08/24/2023    GLUCOSE 106 08/24/2023    CALCIUM 9.3 08/24/2023    ALKPHOS 94 08/24/2023    LABPROT 6.7 08/24/2023    ALBUMIN 3.9 08/24/2023    AST 29 08/24/2023    ALT 30 08/24/2023    TSH 1.450 08/24/2023        Lab Results   Component Value Date    " HGBA1C 5.9 08/24/2023        Hematology:  Lab Results   Component Value Date    WBC 6.38 08/24/2023    RBC 5.04 08/24/2023    HGB 11.4 (L) 08/24/2023    HCT 35.9 (L) 08/24/2023    MCV 71.2 (L) 08/24/2023    MCH 22.6 (L) 08/24/2023    MCHC 31.8 08/24/2023    RDW 18.8 (H) 08/24/2023     (H) 08/24/2023    MPV 9.8 08/24/2023       Lipid Panel:  Lab Results   Component Value Date    CHOL 184 08/24/2023    HDL 40 08/24/2023    DLDL 108.3 (H) 08/24/2023    TRIG 170 08/24/2023        Assessment & Plan:  1. Lumbar disc disease with radiculopathy  Overview:  On Gabapentin 600 mg TID, Cyclobenzaprine TID and Cymbalta 30 mg daily.     Assessment & Plan:  Stop Cyclobenzaprine and try Tizanidine 4 mg BID only as needed.     Orders:  -     tiZANidine (ZANAFLEX) 4 MG tablet; Take 1 tablet (4 mg total) by mouth 2 (two) times daily as needed (muscle spasms).  Dispense: 60 tablet; Refill: 5    2. Class 2 obesity due to excess calories without serious comorbidity with body mass index (BMI) of 36.0 to 36.9 in adult  Assessment & Plan:  Body mass index is 36.69 kg/m².  Goal BMI <30.  Exercise 5 times a week for 30 minutes per day.  Avoid soda, simple sugars, excessive rice, potatoes or bread. Limit fast foods and fried foods.  Choose complex carbs in moderation (example: green vegetables, beans, oatmeal). Eat plenty of fresh fruits and vegetables with lean meats daily.  Do not skip meals. Eat a balanced portion size.  Avoid fad diets. Consider permanent healthy life style changes.         3. Iron deficiency anemia secondary to inadequate dietary iron intake  Assessment & Plan:  Continue iron supplement QOD.      4. IFG (impaired fasting glucose)  Overview:  A1c 5.9%    Assessment & Plan:  Continue with lifestyle modifications and efforts to lose weight.            Future Appointments   Date Time Provider Department Center   8/27/2024  8:10 AM LAB, [facility] LABORATORY DRAW STATION [facility] SA[facility]RA Hussein   9/3/2024  9:00 AM Susie,  MARISOL Mcdonough-C [facility] JOHN Hussein       Follow up if symptoms worsen or fail to improve. Call sooner if needed.    GENOVEVA Harding    Lab Frequency Next Occurrence   Ambulatory referral/consult to Orthopedics Once 05/11/2023   Ambulatory referral/consult to Family Practice Once 06/15/2023   Path Review, Peripheral Smear Once 06/08/2023   Colonoscopy Once 06/26/2023   Ambulatory referral/consult to Smoking Cessation Program Once 07/20/2023

## 2023-10-20 ENCOUNTER — TELEPHONE (OUTPATIENT)
Dept: FAMILY MEDICINE | Facility: CLINIC | Age: 53
End: 2023-10-20
Payer: MEDICAID

## 2023-10-20 DIAGNOSIS — I10 PRIMARY HYPERTENSION: Primary | ICD-10-CM

## 2023-10-20 RX ORDER — HYDROCHLOROTHIAZIDE 12.5 MG/1
12.5 TABLET ORAL DAILY
Qty: 30 TABLET | Refills: 3 | Status: SHIPPED | OUTPATIENT
Start: 2023-10-20 | End: 2024-03-22 | Stop reason: SDUPTHER

## 2023-11-28 ENCOUNTER — OFFICE VISIT (OUTPATIENT)
Dept: FAMILY MEDICINE | Facility: CLINIC | Age: 53
End: 2023-11-28
Payer: MEDICAID

## 2023-11-28 VITALS
HEART RATE: 75 BPM | BODY MASS INDEX: 36.8 KG/M2 | WEIGHT: 200 LBS | OXYGEN SATURATION: 99 % | DIASTOLIC BLOOD PRESSURE: 80 MMHG | SYSTOLIC BLOOD PRESSURE: 116 MMHG | HEIGHT: 62 IN | TEMPERATURE: 98 F

## 2023-11-28 DIAGNOSIS — J06.9 VIRAL URI WITH COUGH: Primary | ICD-10-CM

## 2023-11-28 LAB
CTP QC/QA: YES
CTP QC/QA: YES
FLUAV AG NPH QL: NEGATIVE
FLUBV AG NPH QL: NEGATIVE
SARS-COV-2 AG RESP QL IA.RAPID: NEGATIVE

## 2023-11-28 PROCEDURE — 87400 INFLUENZA A/B EACH AG IA: CPT | Mod: QW,,, | Performed by: NURSE PRACTITIONER

## 2023-11-28 PROCEDURE — 3044F PR MOST RECENT HEMOGLOBIN A1C LEVEL <7.0%: ICD-10-PCS | Mod: CPTII,,, | Performed by: NURSE PRACTITIONER

## 2023-11-28 PROCEDURE — 1159F PR MEDICATION LIST DOCUMENTED IN MEDICAL RECORD: ICD-10-PCS | Mod: CPTII,,, | Performed by: NURSE PRACTITIONER

## 2023-11-28 PROCEDURE — 1160F PR REVIEW ALL MEDS BY PRESCRIBER/CLIN PHARMACIST DOCUMENTED: ICD-10-PCS | Mod: CPTII,,, | Performed by: NURSE PRACTITIONER

## 2023-11-28 PROCEDURE — 99213 OFFICE O/P EST LOW 20 MIN: CPT | Mod: ,,, | Performed by: NURSE PRACTITIONER

## 2023-11-28 PROCEDURE — 87811 SARS-COV-2 COVID19 W/OPTIC: CPT | Mod: QW,,, | Performed by: NURSE PRACTITIONER

## 2023-11-28 PROCEDURE — 3074F PR MOST RECENT SYSTOLIC BLOOD PRESSURE < 130 MM HG: ICD-10-PCS | Mod: CPTII,,, | Performed by: NURSE PRACTITIONER

## 2023-11-28 PROCEDURE — 1159F MED LIST DOCD IN RCRD: CPT | Mod: CPTII,,, | Performed by: NURSE PRACTITIONER

## 2023-11-28 PROCEDURE — 3044F HG A1C LEVEL LT 7.0%: CPT | Mod: CPTII,,, | Performed by: NURSE PRACTITIONER

## 2023-11-28 PROCEDURE — 99213 PR OFFICE/OUTPT VISIT, EST, LEVL III, 20-29 MIN: ICD-10-PCS | Mod: ,,, | Performed by: NURSE PRACTITIONER

## 2023-11-28 PROCEDURE — 4010F ACE/ARB THERAPY RXD/TAKEN: CPT | Mod: CPTII,,, | Performed by: NURSE PRACTITIONER

## 2023-11-28 PROCEDURE — 1160F RVW MEDS BY RX/DR IN RCRD: CPT | Mod: CPTII,,, | Performed by: NURSE PRACTITIONER

## 2023-11-28 PROCEDURE — 3008F PR BODY MASS INDEX (BMI) DOCUMENTED: ICD-10-PCS | Mod: CPTII,,, | Performed by: NURSE PRACTITIONER

## 2023-11-28 PROCEDURE — 3079F DIAST BP 80-89 MM HG: CPT | Mod: CPTII,,, | Performed by: NURSE PRACTITIONER

## 2023-11-28 PROCEDURE — 3079F PR MOST RECENT DIASTOLIC BLOOD PRESSURE 80-89 MM HG: ICD-10-PCS | Mod: CPTII,,, | Performed by: NURSE PRACTITIONER

## 2023-11-28 PROCEDURE — 3008F BODY MASS INDEX DOCD: CPT | Mod: CPTII,,, | Performed by: NURSE PRACTITIONER

## 2023-11-28 PROCEDURE — 4010F PR ACE/ARB THEARPY RXD/TAKEN: ICD-10-PCS | Mod: CPTII,,, | Performed by: NURSE PRACTITIONER

## 2023-11-28 PROCEDURE — 87400 POCT INFLUENZA A/B: ICD-10-PCS | Mod: QW,,, | Performed by: NURSE PRACTITIONER

## 2023-11-28 PROCEDURE — 3074F SYST BP LT 130 MM HG: CPT | Mod: CPTII,,, | Performed by: NURSE PRACTITIONER

## 2023-11-28 PROCEDURE — 87811 SARS CORONAVIRUS 2 ANTIGEN POCT, MANUAL READ: ICD-10-PCS | Mod: QW,,, | Performed by: NURSE PRACTITIONER

## 2023-11-28 RX ORDER — LORATADINE 10 MG/1
10 TABLET ORAL DAILY
COMMUNITY
Start: 2023-11-16 | End: 2023-11-28 | Stop reason: SDUPTHER

## 2023-11-28 RX ORDER — LORATADINE 10 MG/1
10 TABLET ORAL DAILY
Qty: 30 TABLET | Refills: 2 | Status: SHIPPED | OUTPATIENT
Start: 2023-11-28 | End: 2024-03-18

## 2023-11-28 RX ORDER — GUAIFENESIN/DEXTROMETHORPHAN 100-10MG/5
5 SYRUP ORAL EVERY 6 HOURS PRN
Qty: 118 ML | Refills: 0 | Status: SHIPPED | OUTPATIENT
Start: 2023-11-28 | End: 2023-12-08

## 2023-11-28 NOTE — PROGRESS NOTES
Patient ID: [id number]     Chief Complaint: Cough, Headache, Sore Throat, and Otalgia (today)      HPI:     Cynthia Raymond is a 53 y.o. female here today for a problem visit. Complaining of dry cough, right ear blockage, headache, nasal blockage, and sore throat  x 1 days.  Denies fever, body aches, chills, SOB, difficulty breathing, or CP. Admits to co-workers with similar symptoms.. No other complaints today.     ROS: See HPI for details      Past Medical History:  has a past medical history of GERD (gastroesophageal reflux disease), Hypercholesteremia, Hypertension, MISAEL (iron deficiency anemia), Migraine headache, PID (pelvic inflammatory disease), RLS (restless legs syndrome), STD (sexually transmitted disease), Substance abuse, and Tobacco use.    Current Outpatient Medications   Medication Instructions    albuterol (PROVENTIL/VENTOLIN HFA) 90 mcg/actuation inhaler 2 puffs, Inhalation, Every 6 hours PRN    atorvastatin (LIPITOR) 20 mg, Oral, Daily    diclofenac sodium (VOLTAREN) 1 % Gel Topical (Top)    DULoxetine (CYMBALTA) 30 mg, Oral, Daily, Take 1 capsule by mouth once daily.    ferrous sulfate (FEOSOL) 325 mg, Oral, Every other day    fluticasone propionate (FLONASE) 50 mcg/actuation nasal spray 1 spray, Each Nostril, 2 times daily    gabapentin (NEURONTIN) 600 mg, Oral, 3 times daily    GABAPENTIN 4% CREAM APPLY 1 GRAM 3-4 TIMES DAILY FOR TREATMENT OF PAIN    hydroCHLOROthiazide (HYDRODIURIL) 12.5 mg, Oral, Daily     mg, Oral, Every 6 hours PRN    loratadine (CLARITIN) 10 mg, Oral, Daily    nitroGLYCERIN (NITROSTAT) 0.4 MG SL tablet Sublingual    omeprazole (PRILOSEC) 20 mg, Oral, 2 times daily    sumatriptan (IMITREX) 25 mg, Oral, Daily PRN, Take 1 tablet by mouth daily as needed. May repeat dose after 2 hours up to a maximum of 200mg in 24 hours    tiZANidine (ZANAFLEX) 4 mg, Oral, 2 times daily PRN    UNABLE TO FIND 1 mL, Topical (Top), 3 times daily PRN, medication name: Compound  "Medication (Diclofenac and Lidocaine)       Patient is allergic to tramadol.     Patient Care Team:  Margarita Richards FNP-C as PCP - General (Family Medicine)  Evan Mahajan OD as Consulting Physician (Optometry)  Tong Rodriguez MD (Orthopedic Surgery)  Parisa Mendoza NP as Nurse Practitioner (Obstetrics and Gynecology)  Hugh Quispe MD as Consulting Physician (Cardiology)        Visit Vitals  /80 (BP Location: Left arm)   Pulse 75   Temp 97.9 °F (36.6 °C) (Temporal)   Ht 5' 2" (1.575 m)   Wt 90.7 kg (200 lb)   SpO2 99%   BMI 36.58 kg/m²       Physical Exam  Vitals reviewed.   Constitutional:       Appearance: Normal appearance.   HENT:      Right Ear: Tympanic membrane normal.      Left Ear: Tympanic membrane normal.      Nose: Congestion and rhinorrhea present.      Mouth/Throat:      Mouth: Mucous membranes are moist.      Pharynx: Oropharynx is clear. Posterior oropharyngeal erythema present. No oropharyngeal exudate.   Eyes:      Conjunctiva/sclera: Conjunctivae normal.   Cardiovascular:      Pulses: Normal pulses.      Heart sounds: Normal heart sounds.   Pulmonary:      Breath sounds: Normal breath sounds. No wheezing or rales.   Lymphadenopathy:      Cervical: No cervical adenopathy.   Skin:     General: Skin is warm and dry.       Negative FLU and COVID      Assessment:       ICD-10-CM ICD-9-CM   1. Viral URI with cough  J06.9 465.9        - POCT Influenza A/B Rapid Antigen  - SARS Coronavirus 2 Antigen, POCT Manual Read      Start  Use OTC cold meds for symptoms (HTN pt to avoid Sudafed or pseudoephedrine products).  Use OTC Tylenol or Ibuprofen for fever or body aches.  Get plenty of rest, eat a healthy diet, avoid alcohol and smoking.  Sore Throat: Use OTC lozenges or throat sprays, gargle with warm salt and water, warm tea with honey.  Nasal Congestion: Use OTC Mucinex D, humidifier or steamy shower.  Cough: Use Dextromethorphan/Doxylamine Cough Syrup at " night.  Report fever greater than 101 F, breathing problems, painful or worsening cough, or changes in mucous (green, yellow, bloody).  Cover your mouth with coughing, avoid sharing cups or lip balm, wash your hand before eating or touching your face.      No follow-ups on file. In addition to their scheduled follow up, the patient has also been instructed to follow up on as needed basis.     Future Appointments   Date Time Provider Department Center   8/27/2024  8:10 AM LAB, [facility] LABORATORY DRAW STATION [facility] SAHRA Hussein   9/3/2024  9:00 AM Margarita Richards FNP-AIR[facility] GENOVEVA Bundy

## 2023-11-28 NOTE — LETTER
November 28, 2023      [facility]  [address], [place] [address]  Phone: [phone]  Fax: [phone]       Patient: Cynthia Raymond   YOB: 1970  Date of Visit: 11/28/2023    To Whom It May Concern:    Lavell Raymond  was at [facility] on 11/28/2023 with a viral illness. The patient may return to work on Thursday, Nov 30th with no restrictions. If you have any questions or concerns, or if I can be of further assistance, please do not hesitate to contact me.      Sincerely,              MARISOL Rodriguez-C

## 2023-12-26 ENCOUNTER — OFFICE VISIT (OUTPATIENT)
Dept: FAMILY MEDICINE | Facility: CLINIC | Age: 53
End: 2023-12-26
Payer: MEDICAID

## 2023-12-26 VITALS
TEMPERATURE: 98 F | HEIGHT: 62 IN | DIASTOLIC BLOOD PRESSURE: 66 MMHG | WEIGHT: 205 LBS | BODY MASS INDEX: 37.73 KG/M2 | SYSTOLIC BLOOD PRESSURE: 118 MMHG | OXYGEN SATURATION: 97 % | HEART RATE: 82 BPM

## 2023-12-26 DIAGNOSIS — S69.92XA WRIST INJURY, LEFT, INITIAL ENCOUNTER: Primary | ICD-10-CM

## 2023-12-26 PROCEDURE — 99213 OFFICE O/P EST LOW 20 MIN: CPT | Mod: ,,, | Performed by: NURSE PRACTITIONER

## 2023-12-26 PROCEDURE — 3008F PR BODY MASS INDEX (BMI) DOCUMENTED: ICD-10-PCS | Mod: CPTII,,, | Performed by: NURSE PRACTITIONER

## 2023-12-26 PROCEDURE — 1160F RVW MEDS BY RX/DR IN RCRD: CPT | Mod: CPTII,,, | Performed by: NURSE PRACTITIONER

## 2023-12-26 PROCEDURE — 99213 PR OFFICE/OUTPT VISIT, EST, LEVL III, 20-29 MIN: ICD-10-PCS | Mod: ,,, | Performed by: NURSE PRACTITIONER

## 2023-12-26 PROCEDURE — 3074F PR MOST RECENT SYSTOLIC BLOOD PRESSURE < 130 MM HG: ICD-10-PCS | Mod: CPTII,,, | Performed by: NURSE PRACTITIONER

## 2023-12-26 PROCEDURE — 1159F PR MEDICATION LIST DOCUMENTED IN MEDICAL RECORD: ICD-10-PCS | Mod: CPTII,,, | Performed by: NURSE PRACTITIONER

## 2023-12-26 PROCEDURE — 3078F DIAST BP <80 MM HG: CPT | Mod: CPTII,,, | Performed by: NURSE PRACTITIONER

## 2023-12-26 PROCEDURE — 3044F PR MOST RECENT HEMOGLOBIN A1C LEVEL <7.0%: ICD-10-PCS | Mod: CPTII,,, | Performed by: NURSE PRACTITIONER

## 2023-12-26 PROCEDURE — 3008F BODY MASS INDEX DOCD: CPT | Mod: CPTII,,, | Performed by: NURSE PRACTITIONER

## 2023-12-26 PROCEDURE — 3074F SYST BP LT 130 MM HG: CPT | Mod: CPTII,,, | Performed by: NURSE PRACTITIONER

## 2023-12-26 PROCEDURE — 3078F PR MOST RECENT DIASTOLIC BLOOD PRESSURE < 80 MM HG: ICD-10-PCS | Mod: CPTII,,, | Performed by: NURSE PRACTITIONER

## 2023-12-26 PROCEDURE — 4010F PR ACE/ARB THEARPY RXD/TAKEN: ICD-10-PCS | Mod: CPTII,,, | Performed by: NURSE PRACTITIONER

## 2023-12-26 PROCEDURE — 3044F HG A1C LEVEL LT 7.0%: CPT | Mod: CPTII,,, | Performed by: NURSE PRACTITIONER

## 2023-12-26 PROCEDURE — 4010F ACE/ARB THERAPY RXD/TAKEN: CPT | Mod: CPTII,,, | Performed by: NURSE PRACTITIONER

## 2023-12-26 PROCEDURE — 1160F PR REVIEW ALL MEDS BY PRESCRIBER/CLIN PHARMACIST DOCUMENTED: ICD-10-PCS | Mod: CPTII,,, | Performed by: NURSE PRACTITIONER

## 2023-12-26 PROCEDURE — 1159F MED LIST DOCD IN RCRD: CPT | Mod: CPTII,,, | Performed by: NURSE PRACTITIONER

## 2023-12-26 NOTE — PROGRESS NOTES
"Patient ID: Cynthiaever Raymond  : 1970    Chief Complaint: Wrist Pain (left)    Allergies: Patient is allergic to tramadol.     History of Present Illness:  The patient is a 53 y.o. White female who presents to clinic for follow up on Wrist Pain (left)     Tripped and fell this past weekend. Landed on outstretched left hand; pain constant since then. Swelling has improved some after ice application. She has been taking Ibuprofen for pain and swelling since then.     Social History:  reports that she has been smoking cigarettes. She started smoking about 20 years ago. She has a 10.5 pack-year smoking history. She has never been exposed to tobacco smoke. She does not have any smokeless tobacco history on file. She reports that she does not currently use alcohol. She reports that she does not currently use drugs after having used the following drugs: "Crack" cocaine, Hydrocodone, Marijuana, Methamphetamines, and Oxycodone.    Past Medical History:  has a past medical history of GERD (gastroesophageal reflux disease), Hypercholesteremia, Hypertension, MISAEL (iron deficiency anemia), Migraine headache, PID (pelvic inflammatory disease), RLS (restless legs syndrome), STD (sexually transmitted disease), Substance abuse, and Tobacco use.    Current Medications:  Current Outpatient Medications   Medication Instructions    albuterol (PROVENTIL/VENTOLIN HFA) 90 mcg/actuation inhaler 2 puffs, Inhalation, Every 6 hours PRN    atorvastatin (LIPITOR) 20 mg, Oral, Daily    diclofenac sodium (VOLTAREN) 1 % Gel Topical (Top)    DULoxetine (CYMBALTA) 30 mg, Oral, Daily, Take 1 capsule by mouth once daily.    ferrous sulfate (FEOSOL) 325 mg, Oral, Every other day    fluticasone propionate (FLONASE) 50 mcg/actuation nasal spray 1 spray, Each Nostril, 2 times daily    gabapentin (NEURONTIN) 600 mg, Oral, 3 times daily    GABAPENTIN 4% CREAM APPLY 1 GRAM 3-4 TIMES DAILY FOR TREATMENT OF PAIN    hydroCHLOROthiazide (HYDRODIURIL) " "12.5 mg, Oral, Daily     mg, Oral, Every 6 hours PRN    loratadine (CLARITIN) 10 mg, Oral, Daily    nitroGLYCERIN (NITROSTAT) 0.4 MG SL tablet Sublingual    omeprazole (PRILOSEC) 20 mg, Oral, 2 times daily    sumatriptan (IMITREX) 25 mg, Oral, Daily PRN, Take 1 tablet by mouth daily as needed. May repeat dose after 2 hours up to a maximum of 200mg in 24 hours    tiZANidine (ZANAFLEX) 4 mg, Oral, 2 times daily PRN    UNABLE TO FIND 1 mL, Topical (Top), 3 times daily PRN, medication name: Compound Medication (Diclofenac and Lidocaine)       Review of Systems   See HPI    Visit Vitals  /66 (BP Location: Right arm, Patient Position: Sitting, BP Method: Medium (Manual))   Pulse 82   Temp 98.2 °F (36.8 °C) (Temporal)   Ht 5' 2.01" (1.575 m)   Wt 93 kg (205 lb)   SpO2 97%   BMI 37.49 kg/m²       Physical Exam  Vitals reviewed.   Constitutional:       Appearance: Normal appearance.   Musculoskeletal:      Left wrist: Swelling and tenderness (radial wrist) present. No snuff box tenderness. Normal range of motion.   Skin:     General: Skin is warm and dry.            Assessment & Plan:  1. Wrist injury, left, initial encounter  Comments:  Xray left wrist today.  Continue ice, NSAIDs, comperession and rest.  Orders:  -     X-Ray Wrist 2 View Left; Future; Expected date: 12/26/2023         Future Appointments   Date Time Provider Department Center   8/27/2024  8:10 AM LAB, [facility] LABORATORY DRAW STATION [facility] SA[facility]RA Hussein   9/3/2024  9:00 AM [facility] [place]       Follow up if symptoms worsen or fail to improve. Call sooner if needed.    GENOVEVA Harding    Lab Frequency Next Occurrence   Ambulatory referral/consult to Orthopedics Once 05/11/2023   Ambulatory referral/consult to Family Practice Once 06/15/2023   Path Review, Peripheral Smear Once 06/08/2023   Colonoscopy Once 06/26/2023   Ambulatory referral/consult to Smoking Cessation Program Once 07/20/2023          "

## 2024-01-03 ENCOUNTER — TELEPHONE (OUTPATIENT)
Dept: FAMILY MEDICINE | Facility: CLINIC | Age: 54
End: 2024-01-03
Payer: MEDICAID

## 2024-01-03 NOTE — TELEPHONE ENCOUNTER
Patient states her wrist is no doing to good she states she does have carpal tunnel. She states at night its worse.

## 2024-01-03 NOTE — TELEPHONE ENCOUNTER
----- Message from Margarita Richards, CARLAP-C sent at 1/2/2024  6:13 PM CST -----  Wondering how her wrist is doing?  X-ray did not show any fractures dislocations or other abnormalities.

## 2024-01-04 NOTE — TELEPHONE ENCOUNTER
I called an notified her. She said that she is going to call [facility] since they are the one that dx carpal tunnel

## 2024-01-04 NOTE — TELEPHONE ENCOUNTER
Has a said previously the x-ray did not show any bony injury.  So likely her pain really is related to her carpal tunnel.  She just needs to do what we talked about which is get back to wearing her wrist splint, take anti-inflammatories, use ice and try to rest it.  Unless she is ready for referral to a hand surgeon for consideration of carpal tunnel release?

## 2024-01-08 ENCOUNTER — TELEPHONE (OUTPATIENT)
Dept: FAMILY MEDICINE | Facility: CLINIC | Age: 54
End: 2024-01-08
Payer: MEDICAID

## 2024-01-08 DIAGNOSIS — S69.92XA WRIST INJURY, LEFT, INITIAL ENCOUNTER: Primary | ICD-10-CM

## 2024-01-08 RX ORDER — KETOROLAC TROMETHAMINE 10 MG/1
10 TABLET, FILM COATED ORAL EVERY 6 HOURS
Qty: 20 TABLET | Refills: 0 | Status: SHIPPED | OUTPATIENT
Start: 2024-01-08 | End: 2024-01-13

## 2024-02-05 ENCOUNTER — TELEPHONE (OUTPATIENT)
Dept: FAMILY MEDICINE | Facility: CLINIC | Age: 54
End: 2024-02-05
Payer: MEDICAID

## 2024-02-05 DIAGNOSIS — M54.2 NECK PAIN: ICD-10-CM

## 2024-02-05 RX ORDER — IBUPROFEN 600 MG/1
600 TABLET ORAL EVERY 6 HOURS PRN
Qty: 30 TABLET | Refills: 3 | Status: SHIPPED | OUTPATIENT
Start: 2024-02-05

## 2024-03-08 ENCOUNTER — OFFICE VISIT (OUTPATIENT)
Dept: FAMILY MEDICINE | Facility: CLINIC | Age: 54
End: 2024-03-08
Payer: MEDICAID

## 2024-03-08 VITALS
DIASTOLIC BLOOD PRESSURE: 82 MMHG | TEMPERATURE: 98 F | BODY MASS INDEX: 39.34 KG/M2 | WEIGHT: 213.81 LBS | HEART RATE: 103 BPM | HEIGHT: 62 IN | OXYGEN SATURATION: 96 % | SYSTOLIC BLOOD PRESSURE: 122 MMHG

## 2024-03-08 DIAGNOSIS — J10.1 INFLUENZA A: Primary | ICD-10-CM

## 2024-03-08 DIAGNOSIS — R05.1 ACUTE COUGH: ICD-10-CM

## 2024-03-08 LAB
CTP QC/QA: YES
CTP QC/QA: YES
FLUAV AG NPH QL: POSITIVE
FLUBV AG NPH QL: NEGATIVE
SARS-COV-2 AG RESP QL IA.RAPID: NEGATIVE

## 2024-03-08 PROCEDURE — 3079F DIAST BP 80-89 MM HG: CPT | Mod: CPTII,,, | Performed by: NURSE PRACTITIONER

## 2024-03-08 PROCEDURE — 87811 SARS-COV-2 COVID19 W/OPTIC: CPT | Mod: QW,,, | Performed by: NURSE PRACTITIONER

## 2024-03-08 PROCEDURE — 1160F RVW MEDS BY RX/DR IN RCRD: CPT | Mod: CPTII,,, | Performed by: NURSE PRACTITIONER

## 2024-03-08 PROCEDURE — 3074F SYST BP LT 130 MM HG: CPT | Mod: CPTII,,, | Performed by: NURSE PRACTITIONER

## 2024-03-08 PROCEDURE — 99213 OFFICE O/P EST LOW 20 MIN: CPT | Mod: ,,, | Performed by: NURSE PRACTITIONER

## 2024-03-08 PROCEDURE — 1159F MED LIST DOCD IN RCRD: CPT | Mod: CPTII,,, | Performed by: NURSE PRACTITIONER

## 2024-03-08 PROCEDURE — 87400 INFLUENZA A/B EACH AG IA: CPT | Mod: 59,QW,, | Performed by: NURSE PRACTITIONER

## 2024-03-08 PROCEDURE — 3008F BODY MASS INDEX DOCD: CPT | Mod: CPTII,,, | Performed by: NURSE PRACTITIONER

## 2024-03-08 RX ORDER — OSELTAMIVIR PHOSPHATE 75 MG/1
75 CAPSULE ORAL 2 TIMES DAILY
Qty: 10 CAPSULE | Refills: 0 | Status: SHIPPED | OUTPATIENT
Start: 2024-03-08 | End: 2024-03-13

## 2024-03-08 RX ORDER — GABAPENTIN 300 MG/1
300 CAPSULE ORAL 3 TIMES DAILY
COMMUNITY
Start: 2024-02-06

## 2024-03-08 RX ORDER — HYDROCODONE BITARTRATE AND ACETAMINOPHEN 5; 325 MG/1; MG/1
1 TABLET ORAL EVERY 6 HOURS PRN
COMMUNITY
Start: 2024-03-07 | End: 2024-04-30

## 2024-03-08 NOTE — PROGRESS NOTES
"Patient ID: Cynthianellie Raymond  : 1970    Chief Complaint: Sore Throat, Cough, and Nasal Congestion    Allergies: Patient is allergic to tramadol.     History of Present Illness:  The patient is a 53 y.o. White female who presents to clinic for follow up on Sore Throat, Cough, and Nasal Congestion.  She complains of sore throat, nonproductive cough, nasal congestion, facial pressure, myalgia, and headache.  Onset of symptoms about 2 days ago.  Mild improvement with [place] cold.  Multiple family members with similar symptoms.  Denies fever or chills.  No vomiting or diarrhea.  No shortness of breath or wheezing.    Social History:  reports that she has been smoking cigarettes. She started smoking about 21 years ago. She has a 10.6 pack-year smoking history. She has never been exposed to tobacco smoke. She does not have any smokeless tobacco history on file. She reports that she does not currently use alcohol. She reports that she does not currently use drugs after having used the following drugs: "Crack" cocaine, Hydrocodone, Marijuana, Methamphetamines, and Oxycodone.    Past Medical History:  has a past medical history of GERD (gastroesophageal reflux disease), Hypercholesteremia, Hypertension, MISAEL (iron deficiency anemia), Migraine headache, PID (pelvic inflammatory disease), RLS (restless legs syndrome), STD (sexually transmitted disease), Substance abuse, and Tobacco use.    Current Medications:  Current Outpatient Medications   Medication Instructions    albuterol (PROVENTIL/VENTOLIN HFA) 90 mcg/actuation inhaler 2 puffs, Inhalation, Every 6 hours PRN    atorvastatin (LIPITOR) 20 mg, Oral, Daily    diclofenac sodium (VOLTAREN) 1 % Gel Topical (Top)    DULoxetine (CYMBALTA) 30 mg, Oral, Daily, Take 1 capsule by mouth once daily.    ferrous sulfate (FEOSOL) 325 mg, Oral, Every other day    fluticasone propionate (FLONASE) 50 mcg/actuation nasal spray 1 spray, Each Nostril, 2 times daily    " "gabapentin (NEURONTIN) 600 mg, Oral, 3 times daily    gabapentin (NEURONTIN) 300 mg, Oral, 3 times daily    GABAPENTIN 4% CREAM APPLY 1 GRAM 3-4 TIMES DAILY FOR TREATMENT OF PAIN    hydroCHLOROthiazide (HYDRODIURIL) 12.5 mg, Oral, Daily    HYDROcodone-acetaminophen (NORCO) 5-325 mg per tablet 1 tablet, Oral, Every 6 hours PRN     mg, Oral, Every 6 hours PRN    loratadine (CLARITIN) 10 mg, Oral, Daily    nitroGLYCERIN (NITROSTAT) 0.4 MG SL tablet Sublingual    omeprazole (PRILOSEC) 20 mg, Oral, 2 times daily    oseltamivir (TAMIFLU) 75 mg, Oral, 2 times daily    sumatriptan (IMITREX) 25 mg, Oral, Daily PRN, Take 1 tablet by mouth daily as needed. May repeat dose after 2 hours up to a maximum of 200mg in 24 hours    tiZANidine (ZANAFLEX) 4 mg, Oral, 2 times daily PRN    UNABLE TO FIND 1 mL, Topical (Top), 3 times daily PRN, medication name: Compound Medication (Diclofenac and Lidocaine)       ROS: See HPI    Visit Vitals  /82 (BP Location: Right arm, Patient Position: Sitting, BP Method: Medium (Manual))   Pulse 103   Temp 97.7 °F (36.5 °C) (Temporal)   Ht 5' 2.01" (1.575 m)   Wt 97 kg (213 lb 12.8 oz)   SpO2 96%   BMI 39.09 kg/m²       Physical Exam  Vitals reviewed.   Constitutional:       Appearance: Normal appearance.   HENT:      Right Ear: Tympanic membrane, ear canal and external ear normal.      Left Ear: Tympanic membrane, ear canal and external ear normal.      Nose: Congestion present.      Mouth/Throat:      Pharynx: Posterior oropharyngeal erythema present. No oropharyngeal exudate.   Cardiovascular:      Rate and Rhythm: Normal rate and regular rhythm.      Heart sounds: Normal heart sounds.   Pulmonary:      Effort: Pulmonary effort is normal.      Breath sounds: Normal breath sounds.   Musculoskeletal:      Cervical back: Normal range of motion and neck supple. No tenderness.   Lymphadenopathy:      Cervical: No cervical adenopathy.   Skin:     General: Skin is warm and dry. "   Neurological:      Mental Status: She is alert and oriented to person, place, and time. Mental status is at baseline.          Influenza A: positive   Influenza B: negative  COVID-19: negative     Assessment & Plan:  1. Influenza A  -     oseltamivir (TAMIFLU) 75 MG capsule; Take 1 capsule (75 mg total) by mouth 2 (two) times daily. for 5 days  Dispense: 10 capsule; Refill: 0    2. Acute cough  -     POCT Influenza A/B Rapid Antigen  -     SARS Coronavirus 2 Antigen, POCT Manual Read     Tamiflu for 5 days   Continue symptomatic treatment with over-the-counter medications  Rest, hydration   Call office if symptoms persist over the next week or worsen, may need antibiotics or secondary sinusitis      Future Appointments   Date Time Provider Department Center   8/27/2024  8:10 AM LAB, [facility] LABORATORY DRAW STATION [facility] SA[facility]RA Hussein   9/18/2024 10:30 AM Margarita Richards FNP-C [facility] JOHN Hussein       No follow-ups on file. Call sooner if needed.    MARISOL Grewal    Lab Frequency Next Occurrence   Ambulatory referral/consult to Orthopedics Once 05/11/2023   Ambulatory referral/consult to Family Practice Once 06/15/2023   Path Review, Peripheral Smear Once 06/08/2023   Colonoscopy Once 06/26/2023   Ambulatory referral/consult to Smoking Cessation Program Once 07/20/2023

## 2024-03-11 ENCOUNTER — PATIENT MESSAGE (OUTPATIENT)
Dept: FAMILY MEDICINE | Facility: CLINIC | Age: 54
End: 2024-03-11
Payer: MEDICAID

## 2024-03-18 ENCOUNTER — OFFICE VISIT (OUTPATIENT)
Dept: FAMILY MEDICINE | Facility: CLINIC | Age: 54
End: 2024-03-18
Payer: MEDICAID

## 2024-03-18 VITALS
DIASTOLIC BLOOD PRESSURE: 78 MMHG | BODY MASS INDEX: 39.78 KG/M2 | SYSTOLIC BLOOD PRESSURE: 138 MMHG | TEMPERATURE: 98 F | WEIGHT: 216.19 LBS | HEART RATE: 115 BPM | OXYGEN SATURATION: 97 % | HEIGHT: 62 IN

## 2024-03-18 DIAGNOSIS — R06.2 WHEEZING: ICD-10-CM

## 2024-03-18 DIAGNOSIS — J01.90 ACUTE NON-RECURRENT SINUSITIS, UNSPECIFIED LOCATION: Primary | ICD-10-CM

## 2024-03-18 PROCEDURE — 3075F SYST BP GE 130 - 139MM HG: CPT | Mod: CPTII,,, | Performed by: NURSE PRACTITIONER

## 2024-03-18 PROCEDURE — 3078F DIAST BP <80 MM HG: CPT | Mod: CPTII,,, | Performed by: NURSE PRACTITIONER

## 2024-03-18 PROCEDURE — 3008F BODY MASS INDEX DOCD: CPT | Mod: CPTII,,, | Performed by: NURSE PRACTITIONER

## 2024-03-18 PROCEDURE — 99213 OFFICE O/P EST LOW 20 MIN: CPT | Mod: ,,, | Performed by: NURSE PRACTITIONER

## 2024-03-18 PROCEDURE — 1159F MED LIST DOCD IN RCRD: CPT | Mod: CPTII,,, | Performed by: NURSE PRACTITIONER

## 2024-03-18 RX ORDER — PREDNISONE 20 MG/1
20 TABLET ORAL DAILY
Qty: 5 TABLET | Refills: 0 | Status: SHIPPED | OUTPATIENT
Start: 2024-03-18 | End: 2024-03-23

## 2024-03-18 RX ORDER — ALBUTEROL SULFATE 90 UG/1
2 AEROSOL, METERED RESPIRATORY (INHALATION) EVERY 6 HOURS PRN
Qty: 18 G | Refills: 1 | Status: SHIPPED | OUTPATIENT
Start: 2024-03-18

## 2024-03-18 RX ORDER — AMOXICILLIN 875 MG/1
875 TABLET, FILM COATED ORAL EVERY 12 HOURS
Qty: 20 TABLET | Refills: 0 | Status: SHIPPED | OUTPATIENT
Start: 2024-03-18 | End: 2024-03-28

## 2024-03-18 RX ORDER — CETIRIZINE HYDROCHLORIDE 10 MG/1
10 TABLET ORAL DAILY
Qty: 30 TABLET | Refills: 2 | Status: SHIPPED | OUTPATIENT
Start: 2024-03-18 | End: 2024-06-16

## 2024-03-18 RX ORDER — LORATADINE 10 MG/1
10 TABLET ORAL DAILY
Qty: 30 TABLET | Refills: 2 | Status: CANCELLED | OUTPATIENT
Start: 2024-03-18

## 2024-03-18 NOTE — PROGRESS NOTES
"SUBJECTIVE:  Cynthia Raymond is a 53 y.o. female here for Nasal Congestion (Tested positive for the flu on the 8th, symptoms are getting worse. Cough, congestion, short of breath, headache.)      HPI  Presents to the clinic with continued congestion, cough and SOB (on and off).  Was diagnosed with influenza 3/8 and feels like she is getting worse.    Arturs allergies, medications, history, and problem list were updated as appropriate.    Review of Systems   HENT:  Positive for congestion.    Respiratory:  Positive for cough and shortness of breath (on and off with cough).       A comprehensive review of symptoms was completed and negative except as noted above.    Recent Results (from the past 504 hour(s))   POCT Influenza A/B Rapid Antigen    Collection Time: 03/08/24 11:49 AM   Result Value Ref Range    Rapid Influenza A Ag Positive (A) Negative    Rapid Influenza B Ag Negative Negative     Acceptable Yes    SARS Coronavirus 2 Antigen, POCT Manual Read    Collection Time: 03/08/24 11:50 AM   Result Value Ref Range    SARS Coronavirus 2 Antigen Negative Negative     Acceptable Yes        OBJECTIVE:  Vital signs  Vitals:    03/18/24 1330   BP: 138/78   BP Location: Right arm   Pulse: (!) 115   Temp: 97.5 °F (36.4 °C)   TempSrc: Oral   SpO2: 97%   Weight: 98.1 kg (216 lb 3.2 oz)   Height: 5' 2.01" (1.575 m)        Physical Exam  Constitutional:       Appearance: Normal appearance.   HENT:      Head: Normocephalic and atraumatic.      Right Ear: Tympanic membrane and external ear normal.      Left Ear: Tympanic membrane and external ear normal.      Nose: Nose normal.      Mouth/Throat:      Mouth: Mucous membranes are moist.      Pharynx: Oropharynx is clear.   Eyes:      Extraocular Movements: Extraocular movements intact.      Conjunctiva/sclera: Conjunctivae normal.      Pupils: Pupils are equal, round, and reactive to light.   Cardiovascular:      Rate and Rhythm: Normal " rate and regular rhythm.   Pulmonary:      Effort: Pulmonary effort is normal.      Breath sounds: Wheezing present.      Comments: Scattered mild wheezing that improved with cough  Abdominal:      General: Bowel sounds are normal.      Palpations: Abdomen is soft.   Skin:     General: Skin is warm.      Capillary Refill: Capillary refill takes less than 2 seconds.   Neurological:      Mental Status: She is alert and oriented to person, place, and time.   Psychiatric:         Mood and Affect: Mood normal.         Behavior: Behavior normal.         Judgment: Judgment normal.          ASSESSMENT/PLAN:  1. Acute non-recurrent sinusitis, unspecified location  Comments:  increase fluids, tylenol/ibuprofen prn fever/pain, OTC congestion/cough med of patient's choice, complete all 10 days of antibiotics  Orders:  -     amoxicillin (AMOXIL) 875 MG tablet; Take 1 tablet (875 mg total) by mouth every 12 (twelve) hours. for 10 days  Dispense: 20 tablet; Refill: 0  -     cetirizine (ZYRTEC) 10 MG tablet; Take 1 tablet (10 mg total) by mouth once daily.  Dispense: 30 tablet; Refill: 2    2. Wheezing  Comments:  scattered and mild - improves with cough  Orders:  -     albuterol (PROVENTIL/VENTOLIN HFA) 90 mcg/actuation inhaler; Inhale 2 puffs into the lungs every 6 (six) hours as needed for Wheezing or Shortness of Breath.  Dispense: 18 g; Refill: 1  -     predniSONE (DELTASONE) 20 MG tablet; Take 1 tablet (20 mg total) by mouth once daily. for 5 days  Dispense: 5 tablet; Refill: 0        Follow Up:  Follow up if symptoms worsen or fail to improve.

## 2024-03-22 ENCOUNTER — TELEPHONE (OUTPATIENT)
Dept: FAMILY MEDICINE | Facility: CLINIC | Age: 54
End: 2024-03-22
Payer: MEDICAID

## 2024-03-22 DIAGNOSIS — I10 PRIMARY HYPERTENSION: ICD-10-CM

## 2024-03-22 DIAGNOSIS — M51.16 LUMBAR DISC DISEASE WITH RADICULOPATHY: ICD-10-CM

## 2024-03-22 RX ORDER — DULOXETIN HYDROCHLORIDE 30 MG/1
30 CAPSULE, DELAYED RELEASE ORAL DAILY
Qty: 30 CAPSULE | Refills: 5 | Status: SHIPPED | OUTPATIENT
Start: 2024-03-22 | End: 2024-05-31 | Stop reason: SDUPTHER

## 2024-03-22 RX ORDER — HYDROCHLOROTHIAZIDE 12.5 MG/1
12.5 TABLET ORAL DAILY
Qty: 30 TABLET | Refills: 5 | Status: SHIPPED | OUTPATIENT
Start: 2024-03-22

## 2024-04-29 NOTE — PROGRESS NOTES
Chief Complaint:     Chief Complaint   Patient presents with    Vaginal Bleeding     Premenopausal; c/o vaginal bleeding x1 week, heavy & severe cramping/pelvic pain         HPI:   53 y.o.  female   presents with c/o very heavy vaginal bleeding with severe cramping x 1 week.  LMP was May 2023.  No hx HRT.    Hx ureaplasma, treated 2023.    LMP: 24  Frequency: amenorrhea since May 2023    Cycle Length: 5-6 days   Flow: heavy  Intermenstrual Bleeding: no  Postcoital Bleeding: No  Dysmenorrhea: Yes, Severe  Sexually Active: Yes   Dyspareunia: No  Contraception: Tubal ligation   H/o STI: TV  Last pap: 23 WNL HPV Neg GC/CZ/TV Neg      Current Outpatient Medications:     albuterol (PROVENTIL/VENTOLIN HFA) 90 mcg/actuation inhaler, Inhale 2 puffs into the lungs every 6 (six) hours as needed for Wheezing or Shortness of Breath., Disp: 18 g, Rfl: 1    atorvastatin (LIPITOR) 20 MG tablet, Take 1 tablet (20 mg total) by mouth once daily., Disp: 30 tablet, Rfl: 11    cetirizine (ZYRTEC) 10 MG tablet, Take 1 tablet (10 mg total) by mouth once daily., Disp: 30 tablet, Rfl: 2    DULoxetine (CYMBALTA) 30 MG capsule, Take 1 capsule (30 mg total) by mouth once daily. Take 1 capsule by mouth once daily., Disp: 30 capsule, Rfl: 5    ferrous sulfate (FEOSOL) 325 mg (65 mg iron) Tab tablet, Take 1 tablet (325 mg total) by mouth every other day., Disp: 15 tablet, Rfl: 11    fluticasone propionate (FLONASE) 50 mcg/actuation nasal spray, 1 spray by Each Nostril route 2 (two) times daily., Disp: , Rfl:     gabapentin (NEURONTIN) 300 MG capsule, Take 300 mg by mouth 3 (three) times daily., Disp: , Rfl:     gabapentin (NEURONTIN) 600 MG tablet, Take 1 tablet (600 mg total) by mouth 3 (three) times daily., Disp: 90 tablet, Rfl: 11    hydroCHLOROthiazide (HYDRODIURIL) 12.5 MG Tab, Take 1 tablet (12.5 mg total) by mouth once daily., Disp: 30 tablet, Rfl: 5     mg tablet, Take 1 tablet (600 mg total) by mouth every 6  "(six) hours as needed for Pain., Disp: 30 tablet, Rfl: 3    nitroGLYCERIN (NITROSTAT) 0.4 MG SL tablet, Place under the tongue., Disp: , Rfl:     omeprazole (PRILOSEC) 20 MG capsule, Take 1 capsule (20 mg total) by mouth 2 (two) times a day., Disp: 60 capsule, Rfl: 11    sumatriptan (IMITREX) 25 MG Tab, Take 1 tablet (25 mg total) by mouth daily as needed (migraine). Take 1 tablet by mouth daily as needed. May repeat dose after 2 hours up to a maximum of 200mg in 24 hours, Disp: 9 tablet, Rfl: 1    tiZANidine (ZANAFLEX) 4 MG tablet, Take 1 tablet (4 mg total) by mouth 2 (two) times daily as needed (muscle spasms)., Disp: 60 tablet, Rfl: 5    traMADoL (ULTRAM) 50 mg tablet, Take 50 mg by mouth every 6 (six) hours as needed., Disp: , Rfl:     Review of patient's allergies indicates:   Allergen Reactions    Tramadol Itching       Social History     Tobacco Use    Smoking status: Every Day     Current packs/day: 0.50     Average packs/day: 0.5 packs/day for 21.3 years (10.7 ttl pk-yrs)     Types: Cigarettes     Start date: 2003     Passive exposure: Never   Substance Use Topics    Alcohol use: Not Currently    Drug use: Not Currently     Types: "Crack" cocaine, Hydrocodone, Marijuana, Methamphetamines, Oxycodone     Comment: 5 years clean       Review of Systems:   General/Constitutional: Chills denies. Fatigue/weakness denies. Fever denies. Night sweats denies. Hot flashes denies    Respiratory: Cough denies. Hemoptysis denies. SOB denies. Sputum production denies. Wheezing denies .   Cardiovascular: Chest pain denies . Dizziness denies. Palpitations denies. Swelling in hands/feet denies    Gastrointestinal: Abdominal pain denies. Blood in stool denies. Constipation denies. Diarrhea denies. Heartburn denies. Nausea denies. Vomiting denies    Genitourinary: Incontinence denies. Blood in urine denies. Frequent urination denies. Painful urination denies. Urinary urgency denies. Nocturia denies    Gynecologic: Irregular " "admits, lmp 5/2023. Heavy bleeding admits. Painful menses admits Vaginal discharge denies. Vaginal odor denies. Vaginal itching denies. Vaginal lesion denies. Pelvic pain denies. Decreased libido denies. Vulvar lesion denies. Prolapse of genital organs denies. Painful intercourse denies. Postcoital bleeding denies    Psychiatric: Depression denies. Anxiety denies       Physical Exam:   Vitals:    04/30/24 0952   BP: 132/74   Temp: 96.8 °F (36 °C)   Weight: 99.9 kg (220 lb 3.2 oz)   Height: 5' 2.01" (1.575 m)       Body mass index is 40.26 kg/m².    Physical Exam  Exam conducted with a chaperone present.   Constitutional:       Appearance: She is well-developed.   Abdominal:      General: Abdomen is flat. Bowel sounds are normal. There is no distension.      Palpations: Abdomen is soft. There is no mass.      Tenderness: There is no abdominal tenderness.      Hernia: No hernia is present.   Genitourinary:     Vagina: Bleeding (with clots) present. No vaginal discharge, erythema or tenderness.      Cervix: Cervical bleeding present. No cervical motion tenderness or discharge.      Uterus: Not enlarged and not tender.       Adnexa:         Right: No mass, tenderness or fullness.          Left: No mass, tenderness or fullness.     Neurological:      Mental Status: She is alert and oriented to person, place, and time.   Psychiatric:         Attention and Perception: Attention normal.         Mood and Affect: Mood normal.             Assessment:     Patient Active Problem List   Diagnosis    Lumbar disc disease with radiculopathy    Chronic rhinosinusitis    Family history of malignant neoplasm of colon    Gastroesophageal reflux disease    Hypertension    Iron deficiency anemia    Migraine headache    Mixed hyperlipidemia    Class 2 obesity due to excess calories without serious comorbidity with body mass index (BMI) of 36.0 to 36.9 in adult    Restless legs syndrome    Tobacco user    Nontraumatic rupture of tendon of " left thumb    Thrombocytosis    Peripheral edema    History of substance abuse    Heel spur, left    IFG (impaired fasting glucose)       Health Maintenance Due   Topic Date Due    Hepatitis C Screening  Never done    HIV Screening  Never done    Colorectal Cancer Screening  Never done    Pneumococcal Vaccines (Age 0-64) (2 of 2 - PPSV23 or PCV20) 08/08/2017    Shingles Vaccine (2 of 2) 03/29/2023    Influenza Vaccine (1) 09/01/2023    COVID-19 Vaccine (4 - 2023-24 season) 09/01/2023    Mammogram  07/18/2024     Health Maintenance Topics with due status: Not Due       Topic Last Completion Date    TETANUS VACCINE 07/05/2021    Cervical Cancer Screening 07/13/2023    Hemoglobin A1c (Prediabetes) 08/24/2023    Lipid Panel 08/24/2023           Plan:    Cynthia was seen today for vaginal bleeding.    Diagnoses and all orders for this visit:    Pelvic pain  Ct/ng/tv/myco/ureaplasma  -     POCT Urinalysis, Dipstick, Automated, W/O Scope  -     US Pelvis Comp with Transvag NON-OB (xpd; Future    Excessive bleeding in premenopausal period  Pelvic u/s to assess uterus, ovaries  -     US Pelvis Comp with Transvag NON-OB (xpd; Future    Dysmenorrhea  - Educated    - NSAIDs, heating pad, warm bath    - Pain precautions

## 2024-04-30 ENCOUNTER — OFFICE VISIT (OUTPATIENT)
Dept: OBSTETRICS AND GYNECOLOGY | Facility: CLINIC | Age: 54
End: 2024-04-30
Payer: MEDICAID

## 2024-04-30 VITALS
TEMPERATURE: 97 F | HEIGHT: 62 IN | DIASTOLIC BLOOD PRESSURE: 74 MMHG | SYSTOLIC BLOOD PRESSURE: 132 MMHG | WEIGHT: 220.19 LBS | BODY MASS INDEX: 40.52 KG/M2

## 2024-04-30 DIAGNOSIS — N92.4 EXCESSIVE BLEEDING IN PREMENOPAUSAL PERIOD: ICD-10-CM

## 2024-04-30 DIAGNOSIS — N94.6 DYSMENORRHEA: ICD-10-CM

## 2024-04-30 DIAGNOSIS — R10.2 PELVIC PAIN: Primary | ICD-10-CM

## 2024-04-30 LAB
B-HCG UR QL: NEGATIVE
BILIRUB UR QL STRIP: NEGATIVE
CTP QC/QA: YES
GLUCOSE UR QL STRIP: NEGATIVE
KETONES UR QL STRIP: NEGATIVE
LEUKOCYTE ESTERASE UR QL STRIP: NEGATIVE
PH, POC UA: 7
POC BLOOD, URINE: POSITIVE
POC NITRATES, URINE: NEGATIVE
PROT UR QL STRIP: POSITIVE
SP GR UR STRIP: 1.01 (ref 1–1.03)
UROBILINOGEN UR STRIP-ACNC: 0.2 (ref 0.1–1.1)

## 2024-04-30 PROCEDURE — 81025 URINE PREGNANCY TEST: CPT | Mod: ,,, | Performed by: NURSE PRACTITIONER

## 2024-04-30 PROCEDURE — 3075F SYST BP GE 130 - 139MM HG: CPT | Mod: CPTII,,, | Performed by: NURSE PRACTITIONER

## 2024-04-30 PROCEDURE — 81003 URINALYSIS AUTO W/O SCOPE: CPT | Mod: QW,,, | Performed by: NURSE PRACTITIONER

## 2024-04-30 PROCEDURE — 1160F RVW MEDS BY RX/DR IN RCRD: CPT | Mod: CPTII,,, | Performed by: NURSE PRACTITIONER

## 2024-04-30 PROCEDURE — 3008F BODY MASS INDEX DOCD: CPT | Mod: CPTII,,, | Performed by: NURSE PRACTITIONER

## 2024-04-30 PROCEDURE — 3078F DIAST BP <80 MM HG: CPT | Mod: CPTII,,, | Performed by: NURSE PRACTITIONER

## 2024-04-30 PROCEDURE — 99213 OFFICE O/P EST LOW 20 MIN: CPT | Mod: ,,, | Performed by: NURSE PRACTITIONER

## 2024-04-30 PROCEDURE — 1159F MED LIST DOCD IN RCRD: CPT | Mod: CPTII,,, | Performed by: NURSE PRACTITIONER

## 2024-04-30 RX ORDER — TRAMADOL HYDROCHLORIDE 50 MG/1
50 TABLET ORAL EVERY 6 HOURS PRN
COMMUNITY
Start: 2024-04-10 | End: 2024-06-05 | Stop reason: ALTCHOICE

## 2024-05-02 ENCOUNTER — HOSPITAL ENCOUNTER (OUTPATIENT)
Dept: RADIOLOGY | Facility: HOSPITAL | Age: 54
Discharge: HOME | End: 2024-05-02
Attending: NURSE PRACTITIONER
Payer: MEDICAID

## 2024-05-02 DIAGNOSIS — N92.4 EXCESSIVE BLEEDING IN PREMENOPAUSAL PERIOD: ICD-10-CM

## 2024-05-02 DIAGNOSIS — R10.2 PELVIC PAIN: ICD-10-CM

## 2024-05-02 PROCEDURE — 76830 TRANSVAGINAL US NON-OB: CPT | Mod: TC

## 2024-05-02 PROCEDURE — 76856 US EXAM PELVIC COMPLETE: CPT | Mod: TC

## 2024-05-17 NOTE — PROGRESS NOTES
Chief complaint:  Endometrial Biopsy (Here for EMB procedure.)      History of Present Illness:  Cynthia Raymond is a 53 y.o. female  presents for an endometrial biopsy secondary to AUB.  UPT is negative. We discussed office EMB vs hysteroscope D&C in OR.  Patient agrees to proceed with office EMB.  Consent given.     LMP: 24  Frequency: irregular   Cycle Length: 5-6 days   Flow: heavy  Intermenstrual Bleeding: no  Postcoital Bleeding: No  Dysmenorrhea: Yes, Severe  Sexually Active: Yes   Dyspareunia: No  Contraception: Tubal ligation   H/o STI: TV  Last pap: 23 WNL HPV Neg GC/CZ/TV Neg   H/o Abnormal Pap: No   Colposcopy: N/A  Gardasil: 0/3   MM23 Benign   H/o abnl MMG: No  Colonoscopy - Never          Review of Systems:  General/Constitutional: Chills denies. Fatigue/weakness denies. Fever denies . Night sweats denies . Hot flashes denies  Gastrointestinal: Abdominal pain denies. Blood in stool denies. Constipation denies. Diarrhea denies. Heartburn denies. Nausea denies. Vomiting denies   Genitourinary: Incontinence denies. Blood in urine denies. Frequent urination denies.  Urgency denies. Painful urination denies. Nocturia denies   Gynecologic: Irregular menses denies.  Heavy bleeding  denies.  Painful menses denies.  Vaginal discharge denies. Vaginal odor denies. Vaginal itching/Irritation denies. Vaginal lesion denies.  Pelvic pain denies. Decreased libido denies. Vulvar lesion denies. Prolapse of genital organs denies. Painful intercourse denies. Postcoital bleeding denies   Psychiatric: Mood lability denies.  Depressed mood denies. Suicidal thoughts denies. Anxiety denies.  Overwhelmed denies.  Appetite normal. Energy level normal     OB History          4    Para   3    Term   3            AB   1    Living   3         SAB   1    IAB        Ectopic        Multiple        Live Births   3                 Past Medical History:   Diagnosis Date    GERD (gastroesophageal  reflux disease)     Hypercholesteremia     Hypertension     MISAEL (iron deficiency anemia)     Migraine headache     PID (pelvic inflammatory disease)     RLS (restless legs syndrome)     STD (sexually transmitted disease)     Substance abuse     Tobacco use          Current Outpatient Medications:     albuterol (PROVENTIL/VENTOLIN HFA) 90 mcg/actuation inhaler, Inhale 2 puffs into the lungs every 6 (six) hours as needed for Wheezing or Shortness of Breath., Disp: 18 g, Rfl: 1    atorvastatin (LIPITOR) 20 MG tablet, Take 1 tablet (20 mg total) by mouth once daily., Disp: 30 tablet, Rfl: 11    cetirizine (ZYRTEC) 10 MG tablet, Take 1 tablet (10 mg total) by mouth once daily., Disp: 30 tablet, Rfl: 2    DULoxetine (CYMBALTA) 30 MG capsule, Take 1 capsule (30 mg total) by mouth once daily. Take 1 capsule by mouth once daily., Disp: 30 capsule, Rfl: 5    ferrous sulfate (FEOSOL) 325 mg (65 mg iron) Tab tablet, Take 1 tablet (325 mg total) by mouth every other day., Disp: 15 tablet, Rfl: 11    fluticasone propionate (FLONASE) 50 mcg/actuation nasal spray, 1 spray by Each Nostril route 2 (two) times daily., Disp: , Rfl:     gabapentin (NEURONTIN) 300 MG capsule, Take 300 mg by mouth 3 (three) times daily., Disp: , Rfl:     gabapentin (NEURONTIN) 600 MG tablet, Take 1 tablet (600 mg total) by mouth 3 (three) times daily., Disp: 90 tablet, Rfl: 11    hydroCHLOROthiazide (HYDRODIURIL) 12.5 MG Tab, Take 1 tablet (12.5 mg total) by mouth once daily., Disp: 30 tablet, Rfl: 5     mg tablet, Take 1 tablet (600 mg total) by mouth every 6 (six) hours as needed for Pain., Disp: 30 tablet, Rfl: 3    nitroGLYCERIN (NITROSTAT) 0.4 MG SL tablet, Place under the tongue., Disp: , Rfl:     omeprazole (PRILOSEC) 20 MG capsule, Take 1 capsule (20 mg total) by mouth 2 (two) times a day., Disp: 60 capsule, Rfl: 11    sumatriptan (IMITREX) 25 MG Tab, Take 1 tablet (25 mg total) by mouth daily as needed (migraine). Take 1 tablet by mouth  "daily as needed. May repeat dose after 2 hours up to a maximum of 200mg in 24 hours, Disp: 9 tablet, Rfl: 1    tiZANidine (ZANAFLEX) 4 MG tablet, Take 1 tablet (4 mg total) by mouth 2 (two) times daily as needed (muscle spasms)., Disp: 60 tablet, Rfl: 5    traMADoL (ULTRAM) 50 mg tablet, Take 50 mg by mouth every 6 (six) hours as needed., Disp: , Rfl:     Review of patient's allergies indicates:   Allergen Reactions    Tramadol Itching       Past Surgical History:   Procedure Laterality Date    CARPAL TUNNEL RELEASE Bilateral 2024     SECTION       SECTION       SECTION WITH TUBAL LIGATION      CHOLECYSTECTOMY  2020    DILATION AND CURETTAGE OF UTERUS  1990    FOREARM SURGERY Right     Fractured    KNEE ARTHROSCOPY Left     SHOULDER ARTHROSCOPY Right        Family History   Problem Relation Name Age of Onset    Hypertension Father Dave Parks     Heart disease Father Dave Parks     Arthritis Father Dave Parks     Asthma Father Dave Parks     Colon cancer Mother Marina Parks 72    Cancer Mother Marina Parks     Diabetes Mother Marina Parks     Depression Sister      Breast cancer Neg Hx      Ovarian cancer Neg Hx      Uterine cancer Neg Hx      Cervical cancer Neg Hx         Social History     Socioeconomic History    Marital status: Single   Tobacco Use    Smoking status: Every Day     Current packs/day: 0.50     Average packs/day: 0.5 packs/day for 21.4 years (10.7 ttl pk-yrs)     Types: Cigarettes     Start date:      Passive exposure: Never   Substance and Sexual Activity    Alcohol use: Not Currently    Drug use: Not Currently     Types: "Crack" cocaine, Hydrocodone, Marijuana, Methamphetamines, Oxycodone     Comment: 5 years clean    Sexual activity: Yes     Partners: Male     Birth control/protection: See Surgical Hx     Comment: STI: Trich     Social Determinants of Health     Financial Resource Strain: Low Risk  (2023)    Overall " "Financial Resource Strain (CARDIA)     Difficulty of Paying Living Expenses: Not hard at all   Food Insecurity: No Food Insecurity (11/28/2023)    Hunger Vital Sign     Worried About Running Out of Food in the Last Year: Never true     Ran Out of Food in the Last Year: Never true   Transportation Needs: No Transportation Needs (11/28/2023)    PRAPARE - Transportation     Lack of Transportation (Medical): No     Lack of Transportation (Non-Medical): No   Physical Activity: Sufficiently Active (11/28/2023)    Exercise Vital Sign     Days of Exercise per Week: 6 days     Minutes of Exercise per Session: 150+ min   Stress: No Stress Concern Present (11/28/2023)    [employer] - Occupational Stress Questionnaire     Feeling of Stress : Only a little   Housing Stability: Low Risk  (11/28/2023)    Housing Stability Vital Sign     Unable to Pay for Housing in the Last Year: No     Number of Places Lived in the Last Year: 1     Unstable Housing in the Last Year: No         Physical Exam:  /84 (BP Location: Left arm, Patient Position: Sitting)   Pulse 80   Resp 18   Ht 5' 2" (1.575 m)   Wt 100.7 kg (222 lb)   LMP 04/22/2024   BMI 40.60 kg/m²       Chaperone present.     Constitutional: General appearance: healthy, well-nourished and well-developed   Psychiatric: Orientation to time, place and person. Normal mood and affect and active, alert   Abdomen: Auscultation/Inspection/Palpation: No tenderness or masses. Soft, nondistended    Female Genitalia:      Vulva: no masses, atrophy or lesions      Bladder/Urethra: no urethral discharge or mass, normal meatus, bladder non-distended.      Vagina: no tenderness, erythema, cystocele, rectocele, abnormal vaginal discharge, or vesicle(s) or ulcers                   Cervix: no discharge or cervical motion tenderness and grossly normal      Uterus: normal size and shape and midline, non-tender, and no uterine prolapse.      Adnexa/Parametria: no " parametrial tenderness or mass, no adnexal tenderness or ovarian mass.       PROCEDURE:   Verbal consent was obtained.      UPT negative.       Speculum was placed in the vagina. Cervix was cleaned with 3 iodine. The anterior lip of the cervix was then grasped with a single tooth tenaculum. Cervix stenotic. Uterine sound used. Endometrial Pipette was then advanced into the uterus. Suction was then applied to the endometrial Pipette curettage and was performed to 360 degrees. Pipette was then removed from the uterus and the specimen was then placed in specimen cup adequate tissue was confirmed. Tenaculum was then removed from the patient's cervix.  Excellent hemostasis was achieved. All instruments removed from the patient's vagina.   The specimen was placed in formalyn and sent to Pathology for histology evaluation.The patient tolerated the procedure well.      Assessment/Plan:  1. Abnormal uterine bleeding  -     POCT urine pregnancy    2. Thickened endometrium  -     Specimen to Pathology Gynecology and Obstetrics    3. BMI 40.0-44.9, adult           POST ENDOMETRIAL BIOPSY COUNSELING:  Manage post biopsy cramping with NSAIDs or Tylenol.  Expect spotting or light bleeding for a few days.  Report bleeding heavier than a period, fever > 101.0 F, worsening pain or a foul smelling vaginal discharge  Pelvic rest    All questions were answered.    RTC 2 weeks results     This note was transcribed by Sophie Villa. There may be transcription errors as a result, however minimal. Effort has been made to ensure accuracy of transcription, but any obvious errors or omissions should be clarified with the author of the document.

## 2024-05-21 ENCOUNTER — PROCEDURE VISIT (OUTPATIENT)
Dept: OBSTETRICS AND GYNECOLOGY | Facility: CLINIC | Age: 54
End: 2024-05-21
Payer: MEDICAID

## 2024-05-21 VITALS
SYSTOLIC BLOOD PRESSURE: 126 MMHG | RESPIRATION RATE: 18 BRPM | WEIGHT: 222 LBS | HEIGHT: 62 IN | DIASTOLIC BLOOD PRESSURE: 84 MMHG | HEART RATE: 80 BPM | BODY MASS INDEX: 40.85 KG/M2

## 2024-05-21 DIAGNOSIS — R93.89 THICKENED ENDOMETRIUM: ICD-10-CM

## 2024-05-21 DIAGNOSIS — N93.9 ABNORMAL UTERINE BLEEDING: Primary | ICD-10-CM

## 2024-05-21 LAB
B-HCG UR QL: NEGATIVE
CTP QC/QA: YES

## 2024-05-21 PROCEDURE — 81025 URINE PREGNANCY TEST: CPT | Mod: ,,, | Performed by: OBSTETRICS & GYNECOLOGY

## 2024-05-21 PROCEDURE — 58100 BIOPSY OF UTERUS LINING: CPT | Mod: ,,, | Performed by: OBSTETRICS & GYNECOLOGY

## 2024-05-21 PROCEDURE — 99499 UNLISTED E&M SERVICE: CPT | Mod: ,,, | Performed by: OBSTETRICS & GYNECOLOGY

## 2024-05-24 ENCOUNTER — TELEPHONE (OUTPATIENT)
Dept: OBSTETRICS AND GYNECOLOGY | Facility: CLINIC | Age: 54
End: 2024-05-24
Payer: MEDICAID

## 2024-05-24 LAB — PSYCHE PATHOLOGY RESULT: NORMAL

## 2024-05-24 NOTE — TELEPHONE ENCOUNTER
"Dr. Ibarra with [facility] called and stated "This patients EMB is positive for a few adenocarcinoma with complex hyperplasia." Will notify SUZANNA first thing Monday morning to see if he wants to move the patients appointment up.   "

## 2024-05-27 ENCOUNTER — OFFICE VISIT (OUTPATIENT)
Dept: OBSTETRICS AND GYNECOLOGY | Facility: CLINIC | Age: 54
End: 2024-05-27
Payer: MEDICAID

## 2024-05-27 VITALS
HEIGHT: 62 IN | WEIGHT: 220 LBS | BODY MASS INDEX: 40.48 KG/M2 | DIASTOLIC BLOOD PRESSURE: 78 MMHG | SYSTOLIC BLOOD PRESSURE: 126 MMHG

## 2024-05-27 DIAGNOSIS — C55 ENDOMETRIOID ADENOCARCINOMA OF UTERUS: Primary | ICD-10-CM

## 2024-05-27 PROCEDURE — 3078F DIAST BP <80 MM HG: CPT | Mod: CPTII,,, | Performed by: OBSTETRICS & GYNECOLOGY

## 2024-05-27 PROCEDURE — 1159F MED LIST DOCD IN RCRD: CPT | Mod: CPTII,,, | Performed by: OBSTETRICS & GYNECOLOGY

## 2024-05-27 PROCEDURE — 3074F SYST BP LT 130 MM HG: CPT | Mod: CPTII,,, | Performed by: OBSTETRICS & GYNECOLOGY

## 2024-05-27 PROCEDURE — 3008F BODY MASS INDEX DOCD: CPT | Mod: CPTII,,, | Performed by: OBSTETRICS & GYNECOLOGY

## 2024-05-27 PROCEDURE — 99213 OFFICE O/P EST LOW 20 MIN: CPT | Mod: ,,, | Performed by: OBSTETRICS & GYNECOLOGY

## 2024-05-27 NOTE — PROGRESS NOTES
"  53 y.o. female  with abnormal uterine bleeding and thickened endometrium on ultrasound here to follow up pathology from EMB on 2024.    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      US pelvis: 24  FINDINGS:  Uterus is anteverted measuring 8.7 x 5.7 x 5.6 cm.  Myometrium demonstrates normal echogenicity.  The endometrial complex measures 7 cm without mass effect or effacement.     The right ovary measures 2.2 x 1.8 x 1.2 cm.  Left ovary measures 2.8 x 2.5 x 1.9 cm.  Normal arterial inflow and venous outflow waveforms are identified.     Impression:     Concern for thickening of the endometrial complex.  Recommend biopsy.    /78   Ht 5' 2" (1.575 m)   Wt 99.8 kg (220 lb)   LMP 2024   BMI 40.24 kg/m²     Assessment:   1. Endometrioid adenocarcinoma of uterus  - CT Chest Abdomen Pelvis With IV Contrast (XPD) Routine Oral Contrast; Future    Plan:   We discussed the pathology result.    Reviewed the ultrasound images the report lists the endometrial complex as being 7 cm however measurement of the ES on the images was 1.8 cm  We will obtain CT abdomen pelvis with IV contrast to assess for staging  Referral to GYN ONC for management  Questions answered  RTC prn/surveillance      "

## 2024-05-28 ENCOUNTER — TELEPHONE (OUTPATIENT)
Dept: FAMILY MEDICINE | Facility: CLINIC | Age: 54
End: 2024-05-28
Payer: MEDICAID

## 2024-05-28 ENCOUNTER — TELEPHONE (OUTPATIENT)
Dept: OBSTETRICS AND GYNECOLOGY | Facility: CLINIC | Age: 54
End: 2024-05-28
Payer: MEDICAID

## 2024-05-28 DIAGNOSIS — R53.83 FATIGUE, UNSPECIFIED TYPE: ICD-10-CM

## 2024-05-28 DIAGNOSIS — N93.9 ABNORMAL UTERINE BLEEDING: Primary | ICD-10-CM

## 2024-05-28 NOTE — TELEPHONE ENCOUNTER
----- Message from Lennie Sanchez sent at 5/28/2024  1:20 PM CDT -----  Regarding: Patient Message  Contact: Patient  Patient is requesting if she can have her referral sent to [facility] in [place] instead of [place]. Please call patient back to inform   [phone]

## 2024-05-28 NOTE — TELEPHONE ENCOUNTER
"Pt called and said that she has been recently diagnosed with endometrial cancer, stage 1 and Dr. Guerin suggested that Margarita order labs due to her "excessive bleeding and fatigue". She also requested an appt to discuss her anxiety. Appointments scheduled. I spoke with Margarita regarding lab orders.   "

## 2024-05-29 ENCOUNTER — TELEPHONE (OUTPATIENT)
Dept: OBSTETRICS AND GYNECOLOGY | Facility: CLINIC | Age: 54
End: 2024-05-29
Payer: MEDICAID

## 2024-05-29 PROCEDURE — 80053 COMPREHEN METABOLIC PANEL: CPT | Performed by: NURSE PRACTITIONER

## 2024-05-29 PROCEDURE — 85025 COMPLETE CBC W/AUTO DIFF WBC: CPT | Performed by: NURSE PRACTITIONER

## 2024-05-31 ENCOUNTER — OFFICE VISIT (OUTPATIENT)
Dept: FAMILY MEDICINE | Facility: CLINIC | Age: 54
End: 2024-05-31
Payer: MEDICAID

## 2024-05-31 VITALS
DIASTOLIC BLOOD PRESSURE: 72 MMHG | BODY MASS INDEX: 40.85 KG/M2 | HEIGHT: 62 IN | HEART RATE: 108 BPM | SYSTOLIC BLOOD PRESSURE: 128 MMHG | TEMPERATURE: 97 F | OXYGEN SATURATION: 96 % | WEIGHT: 222 LBS

## 2024-05-31 DIAGNOSIS — C55 ENDOMETRIOID ADENOCARCINOMA OF UTERUS: ICD-10-CM

## 2024-05-31 DIAGNOSIS — F41.1 GAD (GENERALIZED ANXIETY DISORDER): ICD-10-CM

## 2024-05-31 DIAGNOSIS — F33.41 RECURRENT MAJOR DEPRESSIVE DISORDER, IN PARTIAL REMISSION: Primary | ICD-10-CM

## 2024-05-31 DIAGNOSIS — R73.01 IFG (IMPAIRED FASTING GLUCOSE): ICD-10-CM

## 2024-05-31 PROBLEM — N93.9 ABNORMAL UTERINE BLEEDING: Status: RESOLVED | Noted: 2024-05-21 | Resolved: 2024-05-31

## 2024-05-31 PROBLEM — R93.89 THICKENED ENDOMETRIUM: Status: RESOLVED | Noted: 2024-05-21 | Resolved: 2024-05-31

## 2024-05-31 LAB
EST. AVERAGE GLUCOSE BLD GHB EST-MCNC: 116.9 MG/DL (ref 70–115)
HBA1C MFR BLD: 5.7 % (ref 4–6)

## 2024-05-31 PROCEDURE — 3078F DIAST BP <80 MM HG: CPT | Mod: CPTII,,, | Performed by: NURSE PRACTITIONER

## 2024-05-31 PROCEDURE — 1160F RVW MEDS BY RX/DR IN RCRD: CPT | Mod: CPTII,,, | Performed by: NURSE PRACTITIONER

## 2024-05-31 PROCEDURE — 1159F MED LIST DOCD IN RCRD: CPT | Mod: CPTII,,, | Performed by: NURSE PRACTITIONER

## 2024-05-31 PROCEDURE — 99214 OFFICE O/P EST MOD 30 MIN: CPT | Mod: ,,, | Performed by: NURSE PRACTITIONER

## 2024-05-31 PROCEDURE — 83036 HEMOGLOBIN GLYCOSYLATED A1C: CPT | Performed by: NURSE PRACTITIONER

## 2024-05-31 PROCEDURE — 3074F SYST BP LT 130 MM HG: CPT | Mod: CPTII,,, | Performed by: NURSE PRACTITIONER

## 2024-05-31 PROCEDURE — 3008F BODY MASS INDEX DOCD: CPT | Mod: CPTII,,, | Performed by: NURSE PRACTITIONER

## 2024-05-31 RX ORDER — DULOXETIN HYDROCHLORIDE 60 MG/1
60 CAPSULE, DELAYED RELEASE ORAL DAILY
Qty: 30 CAPSULE | Refills: 11 | Status: SHIPPED | OUTPATIENT
Start: 2024-05-31

## 2024-05-31 RX ORDER — BUSPIRONE HYDROCHLORIDE 7.5 MG/1
7.5 TABLET ORAL 3 TIMES DAILY
Qty: 90 TABLET | Refills: 11 | Status: SHIPPED | OUTPATIENT
Start: 2024-05-31 | End: 2024-06-05 | Stop reason: SINTOL

## 2024-05-31 NOTE — PROGRESS NOTES
"Patient ID: Cynthianellie Raymond  : 1970    Chief Complaint: Anxiety and Results (Lab results)    Allergies: Patient is allergic to tramadol.     History of Present Illness:  The patient is a 53 y.o. White female who presents to clinic for follow up on Anxiety and Results (Lab results)     Here with complaint of anxiety.  Was recently seen by gyn with complaint of pelvic pain and vaginal bleeding after being 1 year without a menstrual cycle.  She was diagnosed with endometrial cancer.  She has been referred to GYN/ONC in [place]. She is scheduled on .     She has been sad and very tearful since the diagnosis. Also her anxiety is really bad and interfering with her desire to even go outside of her home.    Social History:  reports that she has been smoking cigarettes. She started smoking about 21 years ago. She has a 10.7 pack-year smoking history. She has never been exposed to tobacco smoke. She does not have any smokeless tobacco history on file. She reports that she does not currently use alcohol. She reports that she does not currently use drugs after having used the following drugs: "Crack" cocaine, Hydrocodone, Marijuana, Methamphetamines, and Oxycodone.    Past Medical History:  has a past medical history of GERD (gastroesophageal reflux disease), Hypercholesteremia, Hypertension, MISAEL (iron deficiency anemia), Migraine headache, PID (pelvic inflammatory disease), RLS (restless legs syndrome), STD (sexually transmitted disease), Substance abuse, and Tobacco use.    Current Medications:  Current Outpatient Medications   Medication Instructions    albuterol (PROVENTIL/VENTOLIN HFA) 90 mcg/actuation inhaler 2 puffs, Inhalation, Every 6 hours PRN    atorvastatin (LIPITOR) 20 mg, Oral, Daily    busPIRone (BUSPAR) 7.5 mg, Oral, 3 times daily    cetirizine (ZYRTEC) 10 mg, Oral, Daily    DULoxetine (CYMBALTA) 60 mg, Oral, Daily, Take 1 capsule by mouth once daily.    ferrous sulfate (FEOSOL) 325 mg, " "Oral, Every other day    fluticasone propionate (FLONASE) 50 mcg/actuation nasal spray 1 spray, Each Nostril, 2 times daily    gabapentin (NEURONTIN) 600 mg, Oral, 3 times daily    gabapentin (NEURONTIN) 300 mg, Oral, 3 times daily    hydroCHLOROthiazide (HYDRODIURIL) 12.5 mg, Oral, Daily     mg, Oral, Every 6 hours PRN    nitroGLYCERIN (NITROSTAT) 0.4 MG SL tablet Sublingual    omeprazole (PRILOSEC) 20 mg, Oral, 2 times daily    sumatriptan (IMITREX) 25 mg, Oral, Daily PRN, Take 1 tablet by mouth daily as needed. May repeat dose after 2 hours up to a maximum of 200mg in 24 hours    tiZANidine (ZANAFLEX) 4 mg, Oral, 2 times daily PRN    traMADoL (ULTRAM) 50 mg, Every 6 hours PRN       Review of Systems   See HPI    Visit Vitals  /72 (BP Location: Left arm, Patient Position: Sitting, BP Method: Medium (Manual))   Pulse 108   Temp 97.2 °F (36.2 °C) (Temporal)   Ht 5' 2.01" (1.575 m)   Wt 100.7 kg (222 lb)   LMP 04/22/2024   SpO2 96%   BMI 40.59 kg/m²       Physical Exam  Vitals reviewed.   Constitutional:       Appearance: Normal appearance.   Cardiovascular:      Heart sounds: Normal heart sounds.   Pulmonary:      Breath sounds: Normal breath sounds.   Skin:     General: Skin is warm and dry.   Neurological:      Mental Status: She is oriented to person, place, and time.          Labs Reviewed:  Chemistry:  Lab Results   Component Value Date     05/29/2024    K 3.5 05/29/2024    BUN 14 05/29/2024    CREATININE 0.68 05/29/2024    EGFRNORACEVR >90 05/29/2024    GLUCOSE 102 05/29/2024    CALCIUM 9.4 05/29/2024    ALKPHOS 127 05/29/2024    LABPROT 7.3 05/29/2024    ALBUMIN 4.3 05/29/2024    AST 36 05/29/2024    ALT 51 (H) 05/29/2024    TSH 1.450 08/24/2023        Hematology:  Lab Results   Component Value Date    WBC 8.50 05/29/2024    RBC 5.00 05/29/2024    HGB 14.8 05/29/2024    HCT 42.5 05/29/2024    MCV 85.0 05/29/2024    MCH 29.6 05/29/2024    MCHC 34.8 05/29/2024    RDW 13.6 05/29/2024    PLT " 440 (H) 05/29/2024    MPV 9.5 05/29/2024       Assessment & Plan:  1. Recurrent major depressive disorder, in partial remission  Comments:  Increase Duloxetine to 60 mg daily.   Discussed ER precautions.  Orders:  -     DULoxetine (CYMBALTA) 60 MG capsule; Take 1 capsule (60 mg total) by mouth once daily. Take 1 capsule by mouth once daily.  Dispense: 30 capsule; Refill: 11    2. LAM (generalized anxiety disorder)  Comments:  Add Buspar 7.5 mg TID.  Orders:  -     DULoxetine (CYMBALTA) 60 MG capsule; Take 1 capsule (60 mg total) by mouth once daily. Take 1 capsule by mouth once daily.  Dispense: 30 capsule; Refill: 11  -     busPIRone (BUSPAR) 7.5 MG tablet; Take 1 tablet (7.5 mg total) by mouth 3 (three) times daily.  Dispense: 90 tablet; Refill: 11    3. IFG (impaired fasting glucose)  Comments:  Past due for A1c; will draw today to make sure she has not progressed to diabetes.   Will call with results.  Overview:  A1c 5.9%    Orders:  -     Hemoglobin A1C; Future; Expected date: 05/31/2024    4. Endometrioid adenocarcinoma of uterus  Overview:  Dx 05/2024. Referred to GYN/Onc Jp Mendoza.            Future Appointments   Date Time Provider Department Center   6/4/2024  8:00 AM [facility] [address] LB LIMIT [facility]   7/17/2024  1:00 PM Parisa Mendoza NP [facility]   8/27/2024  8:10 AM LAB, [facility] LABORATORY DRAW STATION [facility] SA[facility]RA [facility]   9/18/2024 10:30 [facility]       Follow up if symptoms worsen or fail to improve, for Keep appointment as scheduled. Call sooner if needed.    GENOVEVA Harding    Lab Frequency Next Occurrence   Ambulatory referral/consult to Orthopedics Once 05/11/2023   Ambulatory referral/consult to Family Practice Once 06/15/2023   Path Review, Peripheral Smear Once 06/08/2023   Colonoscopy Once 06/26/2023   Ambulatory referral/consult to Smoking Cessation Program Once 07/20/2023   CT Chest Abdomen Pelvis  With IV Contrast (XPD) Routine Oral Contrast Once 05/28/2024   Ambulatory referral/consult to Gynecologic Oncology Once 05/28/2024

## 2024-06-04 ENCOUNTER — HOSPITAL ENCOUNTER (OUTPATIENT)
Dept: RADIOLOGY | Facility: HOSPITAL | Age: 54
Discharge: HOME | End: 2024-06-04
Attending: OBSTETRICS & GYNECOLOGY
Payer: MEDICAID

## 2024-06-04 DIAGNOSIS — C55 ENDOMETRIOID ADENOCARCINOMA OF UTERUS: ICD-10-CM

## 2024-06-04 PROCEDURE — 74177 CT ABD & PELVIS W/CONTRAST: CPT | Mod: TC

## 2024-06-04 PROCEDURE — 25500020 PHARM REV CODE 255: Performed by: OBSTETRICS & GYNECOLOGY

## 2024-06-04 PROCEDURE — A9698 NON-RAD CONTRAST MATERIALNOC: HCPCS | Performed by: OBSTETRICS & GYNECOLOGY

## 2024-06-04 RX ADMIN — IOHEXOL 1000 ML: 9 SOLUTION ORAL at 06:06

## 2024-06-04 RX ADMIN — IOHEXOL 90 ML: 300 INJECTION, SOLUTION INTRAVENOUS at 08:06

## 2024-06-05 ENCOUNTER — TELEPHONE (OUTPATIENT)
Dept: FAMILY MEDICINE | Facility: CLINIC | Age: 54
End: 2024-06-05
Payer: MEDICAID

## 2024-06-05 DIAGNOSIS — F33.41 RECURRENT MAJOR DEPRESSIVE DISORDER, IN PARTIAL REMISSION: Primary | ICD-10-CM

## 2024-06-05 RX ORDER — AMITRIPTYLINE HYDROCHLORIDE 25 MG/1
25 TABLET, FILM COATED ORAL NIGHTLY PRN
Qty: 30 TABLET | Refills: 11 | Status: SHIPPED | OUTPATIENT
Start: 2024-06-05 | End: 2025-06-05

## 2024-06-05 NOTE — TELEPHONE ENCOUNTER
Tell her to go ahead and discontinue the buspirone.  We can try some amitriptyline that she can take at night.  It should help with sleep as well as with anxiety.

## 2024-06-05 NOTE — TELEPHONE ENCOUNTER
I called and notified pt. she verbalized understanding. She will give it a try and call us if it doesn't work.

## 2024-06-13 ENCOUNTER — TELEPHONE (OUTPATIENT)
Dept: OBSTETRICS AND GYNECOLOGY | Facility: CLINIC | Age: 54
End: 2024-06-13
Payer: MEDICAID

## 2024-06-13 DIAGNOSIS — C55 ENDOMETRIOID ADENOCARCINOMA OF UTERUS: Primary | ICD-10-CM

## 2024-06-13 DIAGNOSIS — R10.2 PELVIC PAIN: ICD-10-CM

## 2024-06-13 RX ORDER — HYDROCODONE BITARTRATE AND ACETAMINOPHEN 7.5; 325 MG/1; MG/1
1 TABLET ORAL EVERY 6 HOURS PRN
Qty: 15 TABLET | Refills: 0 | Status: SHIPPED | OUTPATIENT
Start: 2024-06-13

## 2024-06-13 NOTE — PROGRESS NOTES
53-year-old female with FIGO grade 1 endometrioid adenocarcinoma called complaining of severe lower abdominal pain and cramping unrelieved with ibuprofen over-the-counter pain medication.  She was currently awaiting gyn Oncology appointment for evaluation and management of this gynecologic cancer.  She was requesting stronger pain medication she was unable to sleep at night for the last 2-3 days.  I have called out a prescription for Norco 7.5 mg p.o. Q 6 hours p.r.n. pain 15. No refills.  To hold her over until she can speak with gyn Oncology and plan of care can be finalized.

## 2024-06-20 DIAGNOSIS — M51.16 LUMBAR DISC DISEASE WITH RADICULOPATHY: ICD-10-CM

## 2024-06-20 RX ORDER — TIZANIDINE 4 MG/1
4 TABLET ORAL 2 TIMES DAILY PRN
Qty: 60 TABLET | Refills: 1 | Status: SHIPPED | OUTPATIENT
Start: 2024-06-20

## 2024-06-25 PROBLEM — M66.842: Status: RESOLVED | Noted: 2023-05-04 | Resolved: 2024-06-25

## 2024-06-25 PROBLEM — E66.09 CLASS 2 OBESITY DUE TO EXCESS CALORIES WITHOUT SERIOUS COMORBIDITY WITH BODY MASS INDEX (BMI) OF 36.0 TO 36.9 IN ADULT: Status: RESOLVED | Noted: 2023-02-01 | Resolved: 2024-06-25

## 2024-06-25 PROBLEM — E66.812 CLASS 2 OBESITY DUE TO EXCESS CALORIES WITHOUT SERIOUS COMORBIDITY WITH BODY MASS INDEX (BMI) OF 36.0 TO 36.9 IN ADULT: Status: RESOLVED | Noted: 2023-02-01 | Resolved: 2024-06-25

## 2024-06-25 PROBLEM — R60.0 PERIPHERAL EDEMA: Status: RESOLVED | Noted: 2023-06-08 | Resolved: 2024-06-25

## 2024-06-25 PROBLEM — Z01.818 PREOP EXAMINATION: Status: ACTIVE | Noted: 2024-06-25

## 2024-07-01 ENCOUNTER — CLINICAL SUPPORT (OUTPATIENT)
Dept: RESPIRATORY THERAPY | Facility: HOSPITAL | Age: 54
End: 2024-07-01
Attending: NURSE PRACTITIONER
Payer: MEDICAID

## 2024-07-01 ENCOUNTER — OFFICE VISIT (OUTPATIENT)
Dept: FAMILY MEDICINE | Facility: CLINIC | Age: 54
End: 2024-07-01
Payer: MEDICAID

## 2024-07-01 ENCOUNTER — HOSPITAL ENCOUNTER (OUTPATIENT)
Dept: RADIOLOGY | Facility: HOSPITAL | Age: 54
Discharge: HOME | End: 2024-07-01
Attending: NURSE PRACTITIONER
Payer: MEDICAID

## 2024-07-01 VITALS
SYSTOLIC BLOOD PRESSURE: 118 MMHG | DIASTOLIC BLOOD PRESSURE: 82 MMHG | HEART RATE: 94 BPM | TEMPERATURE: 97 F | BODY MASS INDEX: 41.22 KG/M2 | OXYGEN SATURATION: 97 % | WEIGHT: 224 LBS | HEIGHT: 62 IN

## 2024-07-01 DIAGNOSIS — Z01.818 PRE-OP EXAMINATION: ICD-10-CM

## 2024-07-01 DIAGNOSIS — Z01.818 PRE-OP TESTING: Primary | ICD-10-CM

## 2024-07-01 DIAGNOSIS — C55 ENDOMETRIOID ADENOCARCINOMA OF UTERUS: ICD-10-CM

## 2024-07-01 DIAGNOSIS — Z72.0 TOBACCO USER: ICD-10-CM

## 2024-07-01 DIAGNOSIS — R73.01 IFG (IMPAIRED FASTING GLUCOSE): ICD-10-CM

## 2024-07-01 DIAGNOSIS — I10 PRIMARY HYPERTENSION: ICD-10-CM

## 2024-07-01 DIAGNOSIS — Z01.818 PRE-OP EXAMINATION: Primary | ICD-10-CM

## 2024-07-01 PROBLEM — C54.1 MALIGNANT NEOPLASM OF ENDOMETRIUM: Status: ACTIVE | Noted: 2024-06-18

## 2024-07-01 PROCEDURE — 71046 X-RAY EXAM CHEST 2 VIEWS: CPT | Mod: TC

## 2024-07-01 PROCEDURE — 1160F RVW MEDS BY RX/DR IN RCRD: CPT | Mod: CPTII,,, | Performed by: NURSE PRACTITIONER

## 2024-07-01 PROCEDURE — 93005 ELECTROCARDIOGRAM TRACING: CPT | Mod: ,,, | Performed by: NURSE PRACTITIONER

## 2024-07-01 PROCEDURE — 3044F HG A1C LEVEL LT 7.0%: CPT | Mod: CPTII,,, | Performed by: NURSE PRACTITIONER

## 2024-07-01 PROCEDURE — 3008F BODY MASS INDEX DOCD: CPT | Mod: CPTII,,, | Performed by: NURSE PRACTITIONER

## 2024-07-01 PROCEDURE — 99214 OFFICE O/P EST MOD 30 MIN: CPT | Mod: ,,, | Performed by: NURSE PRACTITIONER

## 2024-07-01 PROCEDURE — 3079F DIAST BP 80-89 MM HG: CPT | Mod: CPTII,,, | Performed by: NURSE PRACTITIONER

## 2024-07-01 PROCEDURE — 1159F MED LIST DOCD IN RCRD: CPT | Mod: CPTII,,, | Performed by: NURSE PRACTITIONER

## 2024-07-01 PROCEDURE — 3074F SYST BP LT 130 MM HG: CPT | Mod: CPTII,,, | Performed by: NURSE PRACTITIONER

## 2024-07-01 RX ORDER — BUSPIRONE HYDROCHLORIDE 15 MG/1
15 TABLET ORAL 2 TIMES DAILY
COMMUNITY
Start: 2024-05-31

## 2024-07-01 NOTE — ASSESSMENT & PLAN NOTE
Well controlled.   Continue current regimen.   Low Sodium Diet (DASH Diet - Less than 2 grams of sodium per day).  Monitor blood pressure daily and log. Report consistent numbers greater than 140/90.  Maintain healthy weight with goal BMI <30. Exercise 30 minutes per day, 5 days per week.  Smoking cessation encouraged to aid in BP reduction.

## 2024-07-01 NOTE — PROGRESS NOTES
"Patient ID: Cynthianellie Raymond  : 1970    Chief Complaint: surgery clearance    Allergies: Patient is allergic to tramadol.     History of Present Illness:  The patient is a 53 y.o. White female who presents to clinic for follow up on surgery clearance     Here for surgical clearance.  She was recently diagnosed with endometrial cancer; referred to GYN/ONC in [place].  Scheduled for robotic total laparoscopic hysterectomy and bilateral salpingo oophorectomy on 07/15/24.     Social History:  reports that she has been smoking cigarettes. She started smoking about 21 years ago. She has a 10.7 pack-year smoking history. She has been exposed to tobacco smoke. She does not have any smokeless tobacco history on file. She reports that she does not currently use alcohol. She reports that she does not currently use drugs after having used the following drugs: "Crack" cocaine, Hydrocodone, Marijuana, Methamphetamines, and Oxycodone.    Past Medical History:  has a past medical history of GERD (gastroesophageal reflux disease), Hypercholesteremia, Hypertension, MISAEL (iron deficiency anemia), Migraine headache, Nontraumatic rupture of tendon of left thumb, PID (pelvic inflammatory disease), RLS (restless legs syndrome), STD (sexually transmitted disease), Substance abuse, and Tobacco use.    Current Medications:  Current Outpatient Medications   Medication Instructions    albuterol (PROVENTIL/VENTOLIN HFA) 90 mcg/actuation inhaler 2 puffs, Inhalation, Every 6 hours PRN    atorvastatin (LIPITOR) 20 mg, Oral, Daily    busPIRone (BUSPAR) 15 mg, Oral, 2 times daily    cetirizine (ZYRTEC) 10 mg, Oral, Daily    DULoxetine (CYMBALTA) 60 mg, Oral, Daily, Take 1 capsule by mouth once daily.    ferrous sulfate (FEOSOL) 325 mg, Oral, Every other day    fluticasone propionate (FLONASE) 50 mcg/actuation nasal spray 1 spray, Each Nostril, 2 times daily    gabapentin (NEURONTIN) 600 mg, Oral, 3 times daily    gabapentin (NEURONTIN) " "300 mg, Oral, 3 times daily    hydroCHLOROthiazide (HYDRODIURIL) 12.5 mg, Oral, Daily    HYDROcodone-acetaminophen (NORCO) 7.5-325 mg per tablet 1 tablet, Oral, Every 6 hours PRN     mg, Oral, Every 6 hours PRN    nitroGLYCERIN (NITROSTAT) 0.4 MG SL tablet Sublingual    omeprazole (PRILOSEC) 20 mg, Oral, 2 times daily    sumatriptan (IMITREX) 25 mg, Oral, Daily PRN, Take 1 tablet by mouth daily as needed. May repeat dose after 2 hours up to a maximum of 200mg in 24 hours    tiZANidine (ZANAFLEX) 4 mg, Oral, 2 times daily PRN       Review of Systems   See HPI    Visit Vitals  /82 (BP Location: Left arm)   Pulse 94   Temp 97.2 °F (36.2 °C) (Oral)   Ht 5' 2.01" (1.575 m)   Wt 101.6 kg (224 lb)   SpO2 97%   BMI 40.96 kg/m²       Physical Exam  Vitals reviewed.   Constitutional:       Appearance: Normal appearance. She is obese.   Eyes:      Conjunctiva/sclera: Conjunctivae normal.   Cardiovascular:      Heart sounds: Normal heart sounds.   Pulmonary:      Breath sounds: Normal breath sounds.   Abdominal:      General: Bowel sounds are normal.   Skin:     General: Skin is warm and dry.   Neurological:      Mental Status: She is oriented to person, place, and time.        Hematology:  Lab Results   Component Value Date    WBC 6.47 07/01/2024    RBC 4.67 07/01/2024    HGB 13.8 07/01/2024    HCT 40.8 07/01/2024    MCV 87.4 07/01/2024    MCH 29.6 07/01/2024    MCHC 33.8 07/01/2024    RDW 13.5 07/01/2024     07/01/2024    MPV 9.3 (L) 07/01/2024      Chemistry:  Lab Results   Component Value Date     07/01/2024    K 3.3 (L) 07/01/2024    BUN 14 07/01/2024    CREATININE 0.75 07/01/2024    EGFRNORACEVR >90 07/01/2024    GLUCOSE 137 (H) 07/01/2024    CALCIUM 9.2 07/01/2024    ALKPHOS 127 05/29/2024    LABPROT 7.3 05/29/2024    ALBUMIN 4.3 05/29/2024    AST 36 05/29/2024    ALT 51 (H) 05/29/2024    TSH 1.450 08/24/2023             Assessment & Plan:  1. Pre-op examination  Comments:  This patient is " medically stable and at average to below average risk of adverse events with the necessary planned surgical procedure.  Orders:  -     X-Ray Chest PA And Lateral; Future; Expected date: 07/01/2024  -     Basic Metabolic Panel; Future; Expected date: 07/01/2024  -     CBC Auto Differential; Future; Expected date: 07/01/2024  -     EKG 12-lead    2. Endometrioid adenocarcinoma of uterus  Overview:  Dx 05/2024. Referred to GYN/Onc Jp Mendoza.     Orders:  -     X-Ray Chest PA And Lateral; Future; Expected date: 07/01/2024  -     Basic Metabolic Panel; Future; Expected date: 07/01/2024  -     CBC Auto Differential; Future; Expected date: 07/01/2024  -     EKG 12-lead    3. IFG (impaired fasting glucose)  Overview:  Managed with lifestyle changes.     Assessment & Plan:  Diabetes labs:   Lab Results   Component Value Date    HGBA1C 5.7 05/31/2024            4. Primary hypertension  Overview:  On Losartan 25 mg daily and HCTZ 25 mg daily.     Assessment & Plan:  Well controlled.   Continue current regimen.   Low Sodium Diet (DASH Diet - Less than 2 grams of sodium per day).  Monitor blood pressure daily and log. Report consistent numbers greater than 140/90.  Maintain healthy weight with goal BMI <30. Exercise 30 minutes per day, 5 days per week.  Smoking cessation encouraged to aid in BP reduction.        5. Tobacco user  Overview:  1/2 PPD    Assessment & Plan:  Encouraged smoking cessation.           Future Appointments   Date Time Provider Department Center   7/17/2024  1:00 PM Mendoza, Parisa Kale, NP [facility]   8/27/2024  8:10 AM LAB, [facility] LABORATORY DRAW STATION [facility]   9/18/2024 10:30 AM [facility]       Follow up if symptoms worsen or fail to improve, for Keep appointment as scheduled. Call sooner if needed.    GENOVEVA Harding    Lab Frequency Next Occurrence   Ambulatory referral/consult to Family Practice Once 06/15/2023   Path  Review, Peripheral Smear Once 06/08/2023   Colonoscopy Once 06/26/2023   Ambulatory referral/consult to Smoking Cessation Program Once 07/20/2023   Ambulatory referral/consult to Gynecologic Oncology Once 05/28/2024

## 2024-07-02 LAB
OHS QRS DURATION: 90 MS
OHS QTC CALCULATION: 452 MS

## 2024-07-03 ENCOUNTER — TELEPHONE (OUTPATIENT)
Dept: FAMILY MEDICINE | Facility: CLINIC | Age: 54
End: 2024-07-03
Payer: MEDICAID

## 2024-07-03 NOTE — TELEPHONE ENCOUNTER
I spoke with Halie at Dr. Yesenia Reyes's office. She was able to pull the surgery clearance results and visit note from care everywhere.

## 2024-07-24 ENCOUNTER — APPOINTMENT (OUTPATIENT)
Dept: LAB | Facility: HOSPITAL | Age: 54
End: 2024-07-24
Attending: NURSE PRACTITIONER
Payer: MEDICAID

## 2024-07-24 DIAGNOSIS — R30.0 DYSURIA: Primary | ICD-10-CM

## 2024-07-24 LAB
BILIRUB UR QL STRIP.AUTO: NEGATIVE
CLARITY UR: CLEAR
COLOR UR AUTO: YELLOW
GLUCOSE UR QL STRIP: NEGATIVE
HGB UR QL STRIP: NEGATIVE
KETONES UR QL STRIP: ABNORMAL
LEUKOCYTE ESTERASE UR QL STRIP: NEGATIVE
NITRITE UR QL STRIP: NEGATIVE
PH UR STRIP: 6 [PH]
PROT UR QL STRIP: NEGATIVE
SP GR UR STRIP.AUTO: 1.02 (ref 1–1.03)
UROBILINOGEN UR STRIP-ACNC: 0.2

## 2024-07-24 PROCEDURE — 87086 URINE CULTURE/COLONY COUNT: CPT

## 2024-07-24 PROCEDURE — 81003 URINALYSIS AUTO W/O SCOPE: CPT

## 2024-07-26 LAB — BACTERIA UR CULT: NORMAL

## 2024-07-29 ENCOUNTER — TELEPHONE (OUTPATIENT)
Dept: FAMILY MEDICINE | Facility: CLINIC | Age: 54
End: 2024-07-29
Payer: MEDICAID

## 2024-07-29 DIAGNOSIS — M54.2 NECK PAIN: ICD-10-CM

## 2024-07-29 DIAGNOSIS — F41.1 GAD (GENERALIZED ANXIETY DISORDER): Primary | ICD-10-CM

## 2024-07-29 RX ORDER — IBUPROFEN 800 MG/1
800 TABLET ORAL 3 TIMES DAILY
Qty: 90 TABLET | Refills: 0 | Status: SHIPPED | OUTPATIENT
Start: 2024-07-29

## 2024-07-29 RX ORDER — BUSPIRONE HYDROCHLORIDE 15 MG/1
15 TABLET ORAL 3 TIMES DAILY
Qty: 90 TABLET | Refills: 11 | Status: SHIPPED | OUTPATIENT
Start: 2024-07-29

## 2024-07-29 NOTE — TELEPHONE ENCOUNTER
Her rx of buspar was 7.5mg BID. That isn't working. When in the hospital they were giving 15mg BID and that didn't work. She would like to know if you can increase the rx. Also, ibuprofen is 600mg and she would like to see if you can also increase that one. She had her hysterectomy 2 weeks ago and is still having some pain.

## 2024-08-12 ENCOUNTER — OFFICE VISIT (OUTPATIENT)
Dept: FAMILY MEDICINE | Facility: CLINIC | Age: 54
End: 2024-08-12
Payer: MEDICAID

## 2024-08-12 VITALS
TEMPERATURE: 97 F | DIASTOLIC BLOOD PRESSURE: 78 MMHG | OXYGEN SATURATION: 97 % | HEART RATE: 95 BPM | WEIGHT: 222 LBS | BODY MASS INDEX: 40.85 KG/M2 | HEIGHT: 62 IN | SYSTOLIC BLOOD PRESSURE: 124 MMHG

## 2024-08-12 DIAGNOSIS — F33.9 EPISODE OF RECURRENT MAJOR DEPRESSIVE DISORDER, UNSPECIFIED DEPRESSION EPISODE SEVERITY: ICD-10-CM

## 2024-08-12 DIAGNOSIS — T14.8XXA WOUND INFECTION: Primary | ICD-10-CM

## 2024-08-12 DIAGNOSIS — L08.9 WOUND INFECTION: Primary | ICD-10-CM

## 2024-08-12 PROCEDURE — 3008F BODY MASS INDEX DOCD: CPT | Mod: CPTII,,, | Performed by: NURSE PRACTITIONER

## 2024-08-12 PROCEDURE — 1159F MED LIST DOCD IN RCRD: CPT | Mod: CPTII,,, | Performed by: NURSE PRACTITIONER

## 2024-08-12 PROCEDURE — 3074F SYST BP LT 130 MM HG: CPT | Mod: CPTII,,, | Performed by: NURSE PRACTITIONER

## 2024-08-12 PROCEDURE — 3044F HG A1C LEVEL LT 7.0%: CPT | Mod: CPTII,,, | Performed by: NURSE PRACTITIONER

## 2024-08-12 PROCEDURE — 3078F DIAST BP <80 MM HG: CPT | Mod: CPTII,,, | Performed by: NURSE PRACTITIONER

## 2024-08-12 PROCEDURE — 99214 OFFICE O/P EST MOD 30 MIN: CPT | Mod: ,,, | Performed by: NURSE PRACTITIONER

## 2024-08-12 RX ORDER — SULFAMETHOXAZOLE AND TRIMETHOPRIM 800; 160 MG/1; MG/1
1 TABLET ORAL 2 TIMES DAILY
Qty: 20 TABLET | Refills: 0 | Status: SHIPPED | OUTPATIENT
Start: 2024-08-12 | End: 2024-08-22

## 2024-08-12 RX ORDER — DOCUSATE SODIUM 100 MG/1
100 CAPSULE, LIQUID FILLED ORAL 2 TIMES DAILY PRN
COMMUNITY
Start: 2024-07-15 | End: 2024-08-15

## 2024-08-12 RX ORDER — MUPIROCIN 20 MG/G
OINTMENT TOPICAL 3 TIMES DAILY
Qty: 22 G | Refills: 0 | Status: SHIPPED | OUTPATIENT
Start: 2024-08-12

## 2024-08-12 RX ORDER — GABAPENTIN 600 MG/1
600 TABLET ORAL 3 TIMES DAILY
COMMUNITY
Start: 2024-05-22

## 2024-08-12 NOTE — PROGRESS NOTES
"SUBJECTIVE:  Cynthia Raymond is a 53 y.o. female here for Sore (Csection wound )      HPI  Presents to the clinic with c/o open wound at the end of her old  scar.  She recently hap a lap IRENE, but they did not go through her old scar.  For the past two weeks the end of her old scar has been uncomfortable.  She has been using antibiotic ointment with not improvement.  He laparotomy site incisions are well healed from 1 month ago.    Arturs allergies, medications, history, and problem list were updated as appropriate.    Review of Systems   Skin:  Positive for wound.      A comprehensive review of symptoms was completed and negative except as noted above.    Recent Results (from the past 504 hour(s))   Urinalysis, Reflex to Urine Culture    Collection Time: 24  1:21 PM    Specimen: Urine   Result Value Ref Range    Color, UA Yellow Yellow, Light-Yellow, Dark Yellow, Monika, Straw    Appearance, UA Clear Clear    Specific Gravity, UA 1.025 1.005 - 1.030    pH, UA 6.0 5.0 - 8.5    Protein, UA Negative Negative    Glucose, UA Negative Negative, Normal    Ketones, UA Trace (A) Negative    Blood, UA Negative Negative    Bilirubin, UA Negative Negative    Urobilinogen, UA 0.2 0.2, 1.0, Normal    Nitrites, UA Negative Negative    Leukocyte Esterase, UA Negative Negative   Urine culture    Collection Time: 24  1:30 PM    Specimen: Urine   Result Value Ref Range    Urine Culture No Significant Growth        OBJECTIVE:  Vital signs  Vitals:    24 1624   BP: 124/78   BP Location: Right arm   Patient Position: Sitting   BP Method: Large (Manual)   Pulse: 95   Temp: 96.9 °F (36.1 °C)   TempSrc: Oral   SpO2: 97%   Weight: 100.7 kg (222 lb)   Height: 5' 2.01" (1.575 m)        Physical Exam  Constitutional:       Appearance: Normal appearance.   HENT:      Head: Normocephalic and atraumatic.      Nose: Nose normal.      Mouth/Throat:      Mouth: Mucous membranes are moist.      Pharynx: Oropharynx is " clear.   Eyes:      Conjunctiva/sclera: Conjunctivae normal.   Cardiovascular:      Rate and Rhythm: Normal rate and regular rhythm.   Pulmonary:      Effort: Pulmonary effort is normal.      Breath sounds: Normal breath sounds.   Abdominal:      General: Bowel sounds are normal.      Palpations: Abdomen is soft.   Skin:     General: Skin is warm.      Capillary Refill: Capillary refill takes less than 2 seconds.      Comments: End of old scar at pubis with ulceration and redness with a small amount of drainage.   Neurological:      Mental Status: She is alert.   Psychiatric:         Mood and Affect: Mood is anxious.         Behavior: Behavior normal.         Judgment: Judgment normal.          ASSESSMENT/PLAN:  1. Wound infection  Comments:  keeep wound clean and dry, keep skin folds from rubbing together  Orders:  -     sulfamethoxazole-trimethoprim 800-160mg (BACTRIM DS) 800-160 mg Tab; Take 1 tablet by mouth 2 (two) times daily. for 10 days  Dispense: 20 tablet; Refill: 0  -     mupirocin (BACTROBAN) 2 % ointment; Apply topically 3 (three) times daily.  Dispense: 22 g; Refill: 0    2. Episode of recurrent major depressive disorder, unspecified depression episode severity  Comments:  I have reviewed the positive depression score.  She is currently on duloxetine and buspar.        Follow Up:  Follow up if symptoms worsen or fail to improve.            I

## 2024-08-26 ENCOUNTER — OFFICE VISIT (OUTPATIENT)
Dept: FAMILY MEDICINE | Facility: CLINIC | Age: 54
End: 2024-08-26
Payer: MEDICAID

## 2024-08-26 VITALS
TEMPERATURE: 96 F | SYSTOLIC BLOOD PRESSURE: 128 MMHG | HEART RATE: 64 BPM | DIASTOLIC BLOOD PRESSURE: 88 MMHG | HEIGHT: 62 IN | OXYGEN SATURATION: 97 % | BODY MASS INDEX: 41.34 KG/M2 | WEIGHT: 224.63 LBS

## 2024-08-26 DIAGNOSIS — F33.41 RECURRENT MAJOR DEPRESSIVE DISORDER, IN PARTIAL REMISSION: ICD-10-CM

## 2024-08-26 DIAGNOSIS — M51.16 LUMBAR DISC DISEASE WITH RADICULOPATHY: Primary | ICD-10-CM

## 2024-08-26 PROCEDURE — 3074F SYST BP LT 130 MM HG: CPT | Mod: CPTII,,, | Performed by: NURSE PRACTITIONER

## 2024-08-26 PROCEDURE — 3044F HG A1C LEVEL LT 7.0%: CPT | Mod: CPTII,,, | Performed by: NURSE PRACTITIONER

## 2024-08-26 PROCEDURE — 3079F DIAST BP 80-89 MM HG: CPT | Mod: CPTII,,, | Performed by: NURSE PRACTITIONER

## 2024-08-26 PROCEDURE — 99214 OFFICE O/P EST MOD 30 MIN: CPT | Mod: ,,, | Performed by: NURSE PRACTITIONER

## 2024-08-26 PROCEDURE — 1159F MED LIST DOCD IN RCRD: CPT | Mod: CPTII,,, | Performed by: NURSE PRACTITIONER

## 2024-08-26 PROCEDURE — 3008F BODY MASS INDEX DOCD: CPT | Mod: CPTII,,, | Performed by: NURSE PRACTITIONER

## 2024-08-26 RX ORDER — GABAPENTIN 600 MG/1
TABLET ORAL
Qty: 90 TABLET | Refills: 11 | Status: SHIPPED | OUTPATIENT
Start: 2024-08-26

## 2024-08-26 RX ORDER — CARIPRAZINE 1.5 MG/1
1.5 CAPSULE, GELATIN COATED ORAL DAILY
Qty: 14 CAPSULE | Refills: 0 | COMMUNITY
Start: 2024-08-26

## 2024-08-26 NOTE — PROGRESS NOTES
"Patient ID: Cynthia Raymond  : 1970    Chief Complaint: Anxiety (Depression)    Allergies: Patient is allergic to tramadol.     History of Present Illness:  The patient is a 53 y.o. White female who presents to clinic for follow up on Anxiety (Depression)     Complains of depression. Has been on Cymbalta and Buspar and up to now it has worked well. (Consistent fill hx) she was recently diagnosed with Endometrial cancer and had a IRENE last month. She is tearful, feels like she had no desire to do anything that she previously enjoyed. She feels like some of the issues are directly related to her cancer diagnosis. She reports that she is sleeping well at night.     She has seen Dr Renee for her back pain. He discussed surgery but she wants to wait until she gets through with her cancer treatment/plan. She reports that he increased her Gabapentin to 900 mg TID but the pharmacy did not fill her 600 mg dose so she has only had the 300 mg dose. It is not effective.     Social History:  reports that she has been smoking cigarettes. She started smoking about 21 years ago. She has a 10.8 pack-year smoking history. She has been exposed to tobacco smoke. She does not have any smokeless tobacco history on file. She reports that she does not currently use alcohol. She reports that she does not currently use drugs after having used the following drugs: "Crack" cocaine, Hydrocodone, Marijuana, and Methamphetamines.    Past Medical History:  has a past medical history of GERD (gastroesophageal reflux disease), Hypercholesteremia, Hypertension, MISAEL (iron deficiency anemia), Migraine headache, Nontraumatic rupture of tendon of left thumb, PID (pelvic inflammatory disease), RLS (restless legs syndrome), STD (sexually transmitted disease), Substance abuse, and Tobacco use.    Current Medications:  Current Outpatient Medications   Medication Instructions    albuterol (PROVENTIL/VENTOLIN HFA) 90 mcg/actuation inhaler 2 puffs, " Inhalation, Every 6 hours PRN    atorvastatin (LIPITOR) 20 mg, Oral, Daily    busPIRone (BUSPAR) 15 mg, Oral, 3 times daily    DULoxetine (CYMBALTA) 60 mg, Oral, Daily, Take 1 capsule by mouth once daily.    ferrous sulfate (FEOSOL) 325 mg, Oral, Every other day    fluticasone propionate (FLONASE) 50 mcg/actuation nasal spray 1 spray, Each Nostril, 2 times daily    gabapentin (NEURONTIN) 600 MG tablet Take 1 cap PO TID; take with 300 mg dose (total 900 mg TID)    gabapentin (NEURONTIN) 300 mg, Oral, 3 times daily    hydroCHLOROthiazide (HYDRODIURIL) 12.5 mg, Oral, Daily    ibuprofen (ADVIL,MOTRIN) 800 mg, Oral, 3 times daily    mupirocin (BACTROBAN) 2 % ointment Topical (Top), 3 times daily    omeprazole (PRILOSEC) 20 mg, Oral, 2 times daily    sumatriptan (IMITREX) 25 mg, Oral, Daily PRN, Take 1 tablet by mouth daily as needed. May repeat dose after 2 hours up to a maximum of 200mg in 24 hours    tiZANidine (ZANAFLEX) 4 mg, Oral, 2 times daily PRN    VRAYLAR 1.5 mg, Oral, Daily       Review of Systems   Constitutional:  Positive for activity change. Negative for unexpected weight change.   HENT:  Negative for hearing loss, rhinorrhea and trouble swallowing.    Eyes:  Negative for discharge and visual disturbance.   Respiratory:  Negative for chest tightness and wheezing.    Cardiovascular:  Positive for palpitations. Negative for chest pain.   Gastrointestinal:  Negative for blood in stool, constipation, diarrhea and vomiting.   Endocrine: Negative for polydipsia and polyuria.   Genitourinary:  Negative for difficulty urinating, dysuria, hematuria and menstrual problem.   Musculoskeletal:  Negative for arthralgias, joint swelling and neck pain.   Neurological:  Negative for weakness and headaches.   Psychiatric/Behavioral:  Positive for confusion and dysphoric mood.         Visit Vitals  /88 (BP Location: Left arm, Patient Position: Sitting, BP Method: Medium (Manual))   Pulse 64   Temp (!) 95.5 °F (35.3 °C)  "(Temporal)   Ht 5' 2.01" (1.575 m)   Wt 101.9 kg (224 lb 9.6 oz)   LMP 04/22/2024   SpO2 97%   BMI 41.07 kg/m²       Physical Exam  Vitals reviewed.   Constitutional:       Appearance: Normal appearance.   Cardiovascular:      Heart sounds: Normal heart sounds.   Pulmonary:      Breath sounds: Normal breath sounds.   Skin:     General: Skin is warm and dry.   Neurological:      Mental Status: She is oriented to person, place, and time.              Assessment & Plan:  1. Lumbar disc disease with radiculopathy  Overview:  On Gabapentin 600 mg TID, Cyclobenzaprine TID and Cymbalta 30 mg daily.     Orders:  -     gabapentin (NEURONTIN) 600 MG tablet; Take 1 cap PO TID; take with 300 mg dose (total 900 mg TID)  Dispense: 90 tablet; Refill: 11    2. Recurrent major depressive disorder, in partial remission  Overview:  On Cymbalta 60 mg daily and Buspirone 15 mg TID.    Assessment & Plan:  Continue current medications and will ADD Vraylar 1.5 mg daily.  F/U in 2 weeks.   Educated patient on the risks of serotonin based medications such as serotonin modulators and SSRIs/SNRIs including common side effects of nausea, GI upset, headache dizziness as well as the rare risk for worsening symptoms of depression including development of suicidal thoughts or ideations, and serotonin syndrome.   Discussed benefits of medication not becoming noticeable until up to 6 weeks from start date.   Exercise daily. Get sunlight daily.  Practice positive phrases and repeat throughout the day, along with yoga and relaxation techniques.  Establish good social support, make changes to reduce stress.  Encourage counseling.   Reports any symptoms of suicidal/homicidal ideations or self harm immediately. If clinic is closed, go to nearest emergency room.        Orders:  -     cariprazine (VRAYLAR) 1.5 mg Cap; Take 1 capsule (1.5 mg total) by mouth Daily.  Dispense: 14 capsule; Refill: 0         Future Appointments   Date Time Provider Department " [place]   8/27/2024  8:10 AM LAB, [facility] LABORATORY DRAW STATION [facility]RA Hussein   9/18/2024 10:30 AM Margarita Richards FNP-[facility]       Follow up for 2 wk f/u, depression/anxiety. Call sooner if needed.    GENOVEVA Harding    Lab Frequency Next Occurrence   Ambulatory referral/consult to Gynecologic Oncology Once 05/28/2024

## 2024-08-26 NOTE — ASSESSMENT & PLAN NOTE
Continue current medications and will ADD Vraylar 1.5 mg daily.  F/U in 2 weeks.   Educated patient on the risks of serotonin based medications such as serotonin modulators and SSRIs/SNRIs including common side effects of nausea, GI upset, headache dizziness as well as the rare risk for worsening symptoms of depression including development of suicidal thoughts or ideations, and serotonin syndrome.   Discussed benefits of medication not becoming noticeable until up to 6 weeks from start date.   Exercise daily. Get sunlight daily.  Practice positive phrases and repeat throughout the day, along with yoga and relaxation techniques.  Establish good social support, make changes to reduce stress.  Encourage counseling.   Reports any symptoms of suicidal/homicidal ideations or self harm immediately. If clinic is closed, go to nearest emergency room.

## 2024-09-04 ENCOUNTER — HOSPITAL ENCOUNTER (EMERGENCY)
Facility: HOSPITAL | Age: 54
Discharge: HOME | End: 2024-09-04
Payer: MEDICAID

## 2024-09-04 VITALS
WEIGHT: 224 LBS | HEART RATE: 78 BPM | RESPIRATION RATE: 17 BRPM | DIASTOLIC BLOOD PRESSURE: 81 MMHG | OXYGEN SATURATION: 97 % | HEIGHT: 63 IN | SYSTOLIC BLOOD PRESSURE: 124 MMHG | TEMPERATURE: 98 F | BODY MASS INDEX: 39.69 KG/M2

## 2024-09-04 DIAGNOSIS — N39.0 URINARY TRACT INFECTION WITHOUT HEMATURIA, SITE UNSPECIFIED: Primary | ICD-10-CM

## 2024-09-04 LAB
BACTERIA #/AREA URNS AUTO: ABNORMAL /HPF
BILIRUB UR QL STRIP.AUTO: ABNORMAL
CLARITY UR: CLEAR
COLOR UR AUTO: YELLOW
GLUCOSE UR QL STRIP: NEGATIVE
HGB UR QL STRIP: NEGATIVE
KETONES UR QL STRIP: ABNORMAL
LEUKOCYTE ESTERASE UR QL STRIP: ABNORMAL
MUCOUS THREADS URNS QL MICRO: ABNORMAL /LPF
NITRITE UR QL STRIP: NEGATIVE
PH UR STRIP: 6 [PH]
PROT UR QL STRIP: NEGATIVE
RBC #/AREA URNS AUTO: ABNORMAL /HPF
SP GR UR STRIP.AUTO: >=1.03 (ref 1–1.03)
SQUAMOUS #/AREA URNS AUTO: ABNORMAL /HPF
UROBILINOGEN UR STRIP-ACNC: 0.2
WBC #/AREA URNS AUTO: ABNORMAL /HPF

## 2024-09-04 PROCEDURE — 63600175 PHARM REV CODE 636 W HCPCS: Performed by: NURSE PRACTITIONER

## 2024-09-04 PROCEDURE — 81003 URINALYSIS AUTO W/O SCOPE: CPT | Performed by: NURSE PRACTITIONER

## 2024-09-04 PROCEDURE — 25000003 PHARM REV CODE 250: Performed by: NURSE PRACTITIONER

## 2024-09-04 PROCEDURE — 87086 URINE CULTURE/COLONY COUNT: CPT | Performed by: NURSE PRACTITIONER

## 2024-09-04 PROCEDURE — 99285 EMERGENCY DEPT VISIT HI MDM: CPT | Mod: 25

## 2024-09-04 PROCEDURE — 81015 MICROSCOPIC EXAM OF URINE: CPT | Performed by: NURSE PRACTITIONER

## 2024-09-04 PROCEDURE — 96372 THER/PROPH/DIAG INJ SC/IM: CPT | Performed by: NURSE PRACTITIONER

## 2024-09-04 RX ORDER — HYDROCODONE BITARTRATE AND ACETAMINOPHEN 10; 325 MG/1; MG/1
1 TABLET ORAL
Status: COMPLETED | OUTPATIENT
Start: 2024-09-04 | End: 2024-09-04

## 2024-09-04 RX ORDER — CEFTRIAXONE 1 G/1
1 INJECTION, POWDER, FOR SOLUTION INTRAMUSCULAR; INTRAVENOUS
Status: COMPLETED | OUTPATIENT
Start: 2024-09-04 | End: 2024-09-04

## 2024-09-04 RX ADMIN — HYDROCODONE BITARTRATE AND ACETAMINOPHEN 1 TABLET: 10; 325 TABLET ORAL at 08:09

## 2024-09-04 RX ADMIN — CEFTRIAXONE SODIUM 1 G: 1 INJECTION, POWDER, FOR SOLUTION INTRAMUSCULAR; INTRAVENOUS at 09:09

## 2024-09-05 ENCOUNTER — TELEPHONE (OUTPATIENT)
Dept: FAMILY MEDICINE | Facility: CLINIC | Age: 54
End: 2024-09-05
Payer: MEDICAID

## 2024-09-05 NOTE — TELEPHONE ENCOUNTER
I spoke with Margarita. She said that she should be covered with Rocephin 1gm. I called pt and notified her. I instructed her to drink a lot of water to help flush her urinary tract and if needed she could take AZO. She verbalized understanding.    Speaking Coherently

## 2024-09-05 NOTE — ED PROVIDER NOTES
Encounter Date: 2024       History     Chief Complaint   Patient presents with    Back Pain     Sudden onset low back pain onset onset 1 hour PTA-  urinary hesitancy x 2 days, dysuria x 2 days     This 53-year-old female patient presents with complaints of bilateral flank pain x1 hour prior to arrival, urinary hesitancy, frequency and dysuria x2 days, she denies fever      Review of patient's allergies indicates:   Allergen Reactions    Tramadol Itching     Past Medical History:   Diagnosis Date    GERD (gastroesophageal reflux disease)     Hypercholesteremia     Hypertension     MISAEL (iron deficiency anemia)     Migraine headache     Nontraumatic rupture of tendon of left thumb 2023    MRI pending (23)      PID (pelvic inflammatory disease)     RLS (restless legs syndrome)     STD (sexually transmitted disease)     Substance abuse     Tobacco use      Past Surgical History:   Procedure Laterality Date    CARPAL TUNNEL RELEASE Bilateral 2024     SECTION       SECTION       SECTION WITH TUBAL LIGATION  1996    CHOLECYSTECTOMY  2020    DILATION AND CURETTAGE OF UTERUS  1990    FOREARM SURGERY Right     Fractured    HYSTERECTOMY      KNEE ARTHROSCOPY Left     SHOULDER ARTHROSCOPY Right     TUBAL LIGATION       Family History   Problem Relation Name Age of Onset    Hypertension Father Dave Parks     Heart disease Father Dave Parks     Arthritis Father Dave Parks     Asthma Father Dave Parks     Colon cancer Mother Marina Parks 72    Cancer Mother Marina Parks     Diabetes Mother Marina Parks     Depression Sister      Breast cancer Neg Hx      Ovarian cancer Neg Hx      Uterine cancer Neg Hx      Cervical cancer Neg Hx       Social History     Tobacco Use    Smoking status: Every Day     Current packs/day: 0.50     Average packs/day: 0.5 packs/day for 21.7 years (10.8 ttl pk-yrs)     Types: Cigarettes     Start date:      Passive exposure:  "Current   Substance Use Topics    Alcohol use: Not Currently    Drug use: Not Currently     Types: "Crack" cocaine, Hydrocodone, Marijuana, Methamphetamines     Comment: 5 years clean     Review of Systems   Genitourinary:  Positive for dysuria, flank pain and frequency.        Hesitancy with urination   All other systems reviewed and are negative.      Physical Exam     Initial Vitals [09/04/24 2008]   BP Pulse Resp Temp SpO2   137/89 100 20 97.7 °F (36.5 °C) 98 %      MAP       --         Physical Exam    Nursing note and vitals reviewed.  Constitutional: She appears well-developed and well-nourished.   HENT:   Head: Normocephalic and atraumatic.   Eyes: Pupils are equal, round, and reactive to light.   Neck:   Normal range of motion.  Cardiovascular:  Normal rate, regular rhythm and normal heart sounds.           Pulmonary/Chest: Breath sounds normal.   Musculoskeletal:         General: Normal range of motion.      Cervical back: Normal range of motion.      Comments: Mild Bilateral flank tenderness     Neurological: She is alert and oriented to person, place, and time.   Skin: Skin is warm and dry. Capillary refill takes less than 2 seconds.   Psychiatric: She has a normal mood and affect. Her behavior is normal. Judgment and thought content normal.         ED Course   Procedures  Labs Reviewed   URINALYSIS, REFLEX TO URINE CULTURE - Abnormal       Result Value    Color, UA Yellow      Appearance, UA Clear      Specific Gravity, UA >=1.030      pH, UA 6.0      Protein, UA Negative      Glucose, UA Negative      Ketones, UA Trace (*)     Blood, UA Negative      Bilirubin, UA Small (*)     Urobilinogen, UA 0.2      Nitrites, UA Negative      Leukocyte Esterase, UA Trace (*)     Narrative:      URINE STABILITY IS 2 HOURS AT ROOM TEMP OR    SIX HOURS REFRIGERATED. PERFORMING TESTING ON    SPECIMENS GREATER THAN THIS AGE MAY AFFECT THE    FOLLOWING TESTS:    PH          SPECIFIC GRAVITY           BLOOD    CLARITY     " BILIRUBIN               UROBILINOGEN   URINALYSIS, MICROSCOPIC - Abnormal    Bacteria, UA Moderate (*)     Mucous, UA Moderate (*)     RBC, UA 0-5      WBC, UA 6-10 (*)     Squamous Epithelial Cells, UA Moderate (*)    CULTURE, URINE          Imaging Results              CT Abdomen Pelvis  Without Contrast (Final result)  Result time 09/04/24 21:00:47      Final result by Parker Maciel MD (09/04/24 21:00:47)                   Impression:        1.  Chronic and postsurgical findings as described above.  No radiopaque calculus or evidence of obstructive uropathy identified.  No clinically significant abnormalities noted.    n/a    CATEGORY: n/a    The following dose reduction techniques are used for all CT at [facility]:    1.   Automated exposure control.    2.   Adjustment of the mA and/or kV according to patient size.    3.   Use of iterative reconstruction technique.      Electronically signed by: Parker Maciel  Date:    09/04/2024  Time:    21:00               Narrative:    EXAMINATION:  CT ABDOMEN PELVIS WITHOUT CONTRAST    CLINICAL HISTORY:  Flank pain, kidney stone suspected;    TECHNIQUE:  Low dose axial images, sagittal and coronal reformations were obtained from the lung bases to the pubic symphysis.  Oral contrast was not administered.    COMPARISON:  01/17/2020    FINDINGS:  Liver:  No clinically significant abnormalities noted.    Gallbladder/Biliary System:  Compatible with a previous cholecystectomy.    Spleen:  No clinically significant abnormalities noted.    Adrenal glands:  No clinically significant abnormalities noted.    Pancreas:  No clinically significant abnormalities noted.    Kidneys/Urinary Tract:  Phlebolith like calcifications are present in the pelvic region, the presence of which without the administration of intravenous contrast makes evaluation of the ureters difficult however however I see no evidence of obstructive uropathy.    Urinary bladder:  No  clinically significant abnormalities noted.    Prostate gland/uterus and ovaries:  Compatible with a previous hysterectomy.    GI tract:  No clinically significant abnormalities noted.    Vascular structures:  No clinically significant abnormalities noted.    Musculoskeletal structures:  Mild bony demineralization with mild to moderate degenerative findings involve the spine.    Miscellaneous:  No clinically significant abnormalities noted.                                       Medications   HYDROcodone-acetaminophen  mg per tablet 1 tablet (1 tablet Oral Given 9/4/24 2037)   cefTRIAXone injection 1 g (1 g Intramuscular Given 9/4/24 2102)     Medical Decision Making  This 53-year-old female patient presents with complaints of bilateral flank pain x1 hour prior to arrival, urinary hesitancy, frequency and dysuria x2 days, she denies fever  ER diagnosis- UTI without hematuria, site unspecified  Differential diagnosis includes but is not limited to kidney stone, this diagnosis is less likely related to exam and CT and lab results   dc home, stable ,  discharge info explained to pt, and the pt and/or family states understanding and has no further questions at this time.     Amount and/or Complexity of Data Reviewed  Labs:  Decision-making details documented in ED Course.  Radiology: ordered. Decision-making details documented in ED Course.  ECG/medicine tests:  Decision-making details documented in ED Course.    Risk  Prescription drug management.                                      Clinical Impression:  Final diagnoses:  [N39.0] Urinary tract infection without hematuria, site unspecified (Primary)                 Svetlana Solano, MARISOL  09/04/24 2126

## 2024-09-07 LAB — BACTERIA UR CULT: NORMAL

## 2024-09-10 ENCOUNTER — TELEPHONE (OUTPATIENT)
Dept: FAMILY MEDICINE | Facility: CLINIC | Age: 54
End: 2024-09-10

## 2024-09-10 DIAGNOSIS — F33.41 RECURRENT MAJOR DEPRESSIVE DISORDER, IN PARTIAL REMISSION: ICD-10-CM

## 2024-09-10 RX ORDER — CARIPRAZINE 1.5 MG/1
1.5 CAPSULE, GELATIN COATED ORAL DAILY
Qty: 30 CAPSULE | Refills: 11 | COMMUNITY
Start: 2024-09-10 | End: 2024-09-10 | Stop reason: SDUPTHER

## 2024-09-10 RX ORDER — CARIPRAZINE 1.5 MG/1
1.5 CAPSULE, GELATIN COATED ORAL DAILY
Qty: 30 CAPSULE | Refills: 11 | Status: SHIPPED | OUTPATIENT
Start: 2024-09-10

## 2024-09-10 NOTE — TELEPHONE ENCOUNTER
Pt is scheduled on 09/18/24 for  wellness  and no showed herlab appointment. Please call to reschedule the labs. If there isn't an available appointment, see if she can go to the hospital to have them drawn. If not, please reschedule both appointments.

## 2024-09-10 NOTE — TELEPHONE ENCOUNTER
I called her to see if she was still planning on coming to her appt this afternoon. She said that she is not able to due to having her grandkids and the weather. She states that the Vraylar 1.5mg is working amazingly well, it is getting her out of the house and she is moving around a lot more. She has had an increase in sciatica and RLS symptoms since starting it and feels like it could be due to being more active or from the medicine but wants to continue taking it. Please advise.

## 2024-09-25 DIAGNOSIS — I10 PRIMARY HYPERTENSION: ICD-10-CM

## 2024-09-25 DIAGNOSIS — E78.2 MIXED HYPERLIPIDEMIA: ICD-10-CM

## 2024-09-25 DIAGNOSIS — D50.8 IRON DEFICIENCY ANEMIA SECONDARY TO INADEQUATE DIETARY IRON INTAKE: ICD-10-CM

## 2024-09-26 RX ORDER — HYDROCHLOROTHIAZIDE 12.5 MG/1
12.5 TABLET ORAL DAILY
Qty: 30 TABLET | Refills: 5 | Status: SHIPPED | OUTPATIENT
Start: 2024-09-26

## 2024-09-26 RX ORDER — FERROUS SULFATE 325(65) MG
325 TABLET ORAL EVERY OTHER DAY
Qty: 15 TABLET | Refills: 11 | Status: SHIPPED | OUTPATIENT
Start: 2024-09-26

## 2024-09-26 RX ORDER — ATORVASTATIN CALCIUM 20 MG/1
20 TABLET, FILM COATED ORAL DAILY
Qty: 30 TABLET | Refills: 11 | Status: SHIPPED | OUTPATIENT
Start: 2024-09-26

## 2024-10-02 ENCOUNTER — TELEPHONE (OUTPATIENT)
Dept: FAMILY MEDICINE | Facility: CLINIC | Age: 54
End: 2024-10-02
Payer: MEDICAID

## 2024-10-02 NOTE — TELEPHONE ENCOUNTER
Pt stated her BP is running 130's.  When she gets dizzy, her vision is blurry. Pt went to eye dr 2 weeks ago because she had pressure in her eyes was given some eye drops. The dizziness is on and off. She checked her blood sugar last night and it was 147.  Informed pt Mikayla is out of the office but if symptoms worsen to go to ER. She voiced understanding.  Informed her Mikayla will address this when she returns.

## 2024-10-07 ENCOUNTER — PATIENT OUTREACH (OUTPATIENT)
Dept: ADMINISTRATIVE | Facility: HOSPITAL | Age: 54
End: 2024-10-07
Payer: MEDICAID

## 2024-10-14 DIAGNOSIS — M51.16 LUMBAR DISC DISEASE WITH RADICULOPATHY: ICD-10-CM

## 2024-10-14 RX ORDER — TIZANIDINE 4 MG/1
4 TABLET ORAL 2 TIMES DAILY PRN
Qty: 60 TABLET | Refills: 1 | Status: SHIPPED | OUTPATIENT
Start: 2024-10-14

## 2024-10-22 DIAGNOSIS — K21.9 GASTROESOPHAGEAL REFLUX DISEASE WITHOUT ESOPHAGITIS: ICD-10-CM

## 2024-10-22 RX ORDER — OMEPRAZOLE 20 MG/1
20 CAPSULE, DELAYED RELEASE ORAL 2 TIMES DAILY
Qty: 60 CAPSULE | Refills: 11 | Status: SHIPPED | OUTPATIENT
Start: 2024-10-22

## 2024-10-28 ENCOUNTER — OFFICE VISIT (OUTPATIENT)
Dept: FAMILY MEDICINE | Facility: CLINIC | Age: 54
End: 2024-10-28
Payer: MEDICAID

## 2024-10-28 VITALS
BODY MASS INDEX: 40.19 KG/M2 | DIASTOLIC BLOOD PRESSURE: 78 MMHG | SYSTOLIC BLOOD PRESSURE: 122 MMHG | HEIGHT: 63 IN | WEIGHT: 226.81 LBS | TEMPERATURE: 97 F | HEART RATE: 96 BPM | OXYGEN SATURATION: 99 %

## 2024-10-28 DIAGNOSIS — R42 VERTIGO: Primary | ICD-10-CM

## 2024-10-28 LAB
ALBUMIN SERPL-MCNC: 4.4 G/DL (ref 3.4–5)
ALBUMIN/GLOB SERPL: 1.6 RATIO
ALP SERPL-CCNC: 130 UNIT/L (ref 50–144)
ALT SERPL-CCNC: 51 UNIT/L (ref 1–45)
ANION GAP SERPL CALC-SCNC: 6 MEQ/L (ref 2–13)
AST SERPL-CCNC: 33 UNIT/L (ref 14–36)
BASOPHILS # BLD AUTO: 0.04 X10(3)/MCL (ref 0.01–0.08)
BASOPHILS NFR BLD AUTO: 0.6 % (ref 0.1–1.2)
BILIRUB SERPL-MCNC: 0.1 MG/DL (ref 0–1)
BUN SERPL-MCNC: 15 MG/DL (ref 7–20)
CALCIUM SERPL-MCNC: 9.7 MG/DL (ref 8.4–10.2)
CHLORIDE SERPL-SCNC: 103 MMOL/L (ref 98–110)
CO2 SERPL-SCNC: 30 MMOL/L (ref 21–32)
CREAT SERPL-MCNC: 0.88 MG/DL (ref 0.66–1.25)
CREAT/UREA NIT SERPL: 17 (ref 12–20)
EOSINOPHIL # BLD AUTO: 0.27 X10(3)/MCL (ref 0.04–0.36)
EOSINOPHIL NFR BLD AUTO: 3.9 % (ref 0.7–7)
ERYTHROCYTE [DISTWIDTH] IN BLOOD BY AUTOMATED COUNT: 13.6 % (ref 11–14.5)
EST. AVERAGE GLUCOSE BLD GHB EST-MCNC: 122.6 MG/DL (ref 70–115)
GFR SERPLBLD CREATININE-BSD FMLA CKD-EPI: 78 ML/MIN/1.73/M2
GLOBULIN SER-MCNC: 2.7 GM/DL (ref 2–3.9)
GLUCOSE SERPL-MCNC: 119 MG/DL (ref 70–115)
HBA1C MFR BLD: 5.9 % (ref 4–6)
HCT VFR BLD AUTO: 40 % (ref 36–48)
HGB BLD-MCNC: 13.5 G/DL (ref 11.8–16)
IMM GRANULOCYTES # BLD AUTO: 0.02 X10(3)/MCL (ref 0–0.03)
IMM GRANULOCYTES NFR BLD AUTO: 0.3 % (ref 0–0.5)
LYMPHOCYTES # BLD AUTO: 1.82 X10(3)/MCL (ref 1.16–3.74)
LYMPHOCYTES NFR BLD AUTO: 26 % (ref 20–55)
MCH RBC QN AUTO: 29 PG (ref 27–34)
MCHC RBC AUTO-ENTMCNC: 33.8 G/DL (ref 31–37)
MCV RBC AUTO: 85.8 FL (ref 79–99)
MONOCYTES # BLD AUTO: 0.73 X10(3)/MCL (ref 0.24–0.36)
MONOCYTES NFR BLD AUTO: 10.4 % (ref 4.7–12.5)
NEUTROPHILS # BLD AUTO: 4.13 X10(3)/MCL (ref 1.56–6.13)
NEUTROPHILS NFR BLD AUTO: 58.8 % (ref 37–73)
NRBC BLD AUTO-RTO: 0 %
PLATELET # BLD AUTO: 372 X10(3)/MCL (ref 140–371)
PMV BLD AUTO: 9.6 FL (ref 9.4–12.4)
POTASSIUM SERPL-SCNC: 3.6 MMOL/L (ref 3.5–5.1)
PROT SERPL-MCNC: 7.1 GM/DL (ref 6.3–8.2)
RBC # BLD AUTO: 4.66 X10(6)/MCL (ref 4–5.1)
SODIUM SERPL-SCNC: 139 MMOL/L (ref 136–145)
TSH SERPL-ACNC: 1.18 UIU/ML (ref 0.36–3.74)
WBC # BLD AUTO: 7.01 X10(3)/MCL (ref 4–11.5)

## 2024-10-28 PROCEDURE — 3008F BODY MASS INDEX DOCD: CPT | Mod: CPTII,,, | Performed by: NURSE PRACTITIONER

## 2024-10-28 PROCEDURE — 80053 COMPREHEN METABOLIC PANEL: CPT | Performed by: NURSE PRACTITIONER

## 2024-10-28 PROCEDURE — 1159F MED LIST DOCD IN RCRD: CPT | Mod: CPTII,,, | Performed by: NURSE PRACTITIONER

## 2024-10-28 PROCEDURE — 99214 OFFICE O/P EST MOD 30 MIN: CPT | Mod: ,,, | Performed by: NURSE PRACTITIONER

## 2024-10-28 PROCEDURE — 85025 COMPLETE CBC W/AUTO DIFF WBC: CPT | Performed by: NURSE PRACTITIONER

## 2024-10-28 PROCEDURE — 3078F DIAST BP <80 MM HG: CPT | Mod: CPTII,,, | Performed by: NURSE PRACTITIONER

## 2024-10-28 PROCEDURE — 83036 HEMOGLOBIN GLYCOSYLATED A1C: CPT | Performed by: NURSE PRACTITIONER

## 2024-10-28 PROCEDURE — 3044F HG A1C LEVEL LT 7.0%: CPT | Mod: CPTII,,, | Performed by: NURSE PRACTITIONER

## 2024-10-28 PROCEDURE — 3074F SYST BP LT 130 MM HG: CPT | Mod: CPTII,,, | Performed by: NURSE PRACTITIONER

## 2024-10-28 PROCEDURE — 1160F RVW MEDS BY RX/DR IN RCRD: CPT | Mod: CPTII,,, | Performed by: NURSE PRACTITIONER

## 2024-10-28 PROCEDURE — 84443 ASSAY THYROID STIM HORMONE: CPT | Performed by: NURSE PRACTITIONER

## 2024-10-28 RX ORDER — MECLIZINE HCL 12.5 MG 12.5 MG/1
12.5 TABLET ORAL 3 TIMES DAILY PRN
Qty: 30 TABLET | Refills: 0 | Status: SHIPPED | OUTPATIENT
Start: 2024-10-28 | End: 2024-11-07

## 2024-10-29 ENCOUNTER — TELEPHONE (OUTPATIENT)
Dept: FAMILY MEDICINE | Facility: CLINIC | Age: 54
End: 2024-10-29
Payer: MEDICAID

## 2024-11-20 DIAGNOSIS — K21.9 GASTROESOPHAGEAL REFLUX DISEASE WITHOUT ESOPHAGITIS: ICD-10-CM

## 2024-11-20 DIAGNOSIS — R42 VERTIGO: ICD-10-CM

## 2024-11-20 RX ORDER — OMEPRAZOLE 20 MG/1
20 CAPSULE, DELAYED RELEASE ORAL 2 TIMES DAILY
Qty: 60 CAPSULE | Refills: 11 | Status: SHIPPED | OUTPATIENT
Start: 2024-11-20

## 2024-11-20 RX ORDER — MECLIZINE HCL 12.5 MG 12.5 MG/1
12.5 TABLET ORAL 3 TIMES DAILY PRN
Qty: 30 TABLET | Refills: 5 | Status: SHIPPED | OUTPATIENT
Start: 2024-11-20 | End: 2024-11-30

## 2024-12-02 ENCOUNTER — OFFICE VISIT (OUTPATIENT)
Dept: FAMILY MEDICINE | Facility: CLINIC | Age: 54
End: 2024-12-02
Payer: MEDICAID

## 2024-12-02 VITALS
OXYGEN SATURATION: 97 % | BODY MASS INDEX: 40.4 KG/M2 | HEIGHT: 63 IN | SYSTOLIC BLOOD PRESSURE: 116 MMHG | DIASTOLIC BLOOD PRESSURE: 78 MMHG | TEMPERATURE: 98 F | WEIGHT: 228 LBS | HEART RATE: 89 BPM

## 2024-12-02 DIAGNOSIS — E66.09 CLASS 2 OBESITY DUE TO EXCESS CALORIES WITHOUT SERIOUS COMORBIDITY WITH BODY MASS INDEX (BMI) OF 36.0 TO 36.9 IN ADULT: Primary | ICD-10-CM

## 2024-12-02 DIAGNOSIS — R73.01 IFG (IMPAIRED FASTING GLUCOSE): ICD-10-CM

## 2024-12-02 DIAGNOSIS — R42 VERTIGO: Primary | ICD-10-CM

## 2024-12-02 DIAGNOSIS — E66.812 CLASS 2 OBESITY DUE TO EXCESS CALORIES WITHOUT SERIOUS COMORBIDITY WITH BODY MASS INDEX (BMI) OF 36.0 TO 36.9 IN ADULT: Primary | ICD-10-CM

## 2024-12-02 PROCEDURE — 3008F BODY MASS INDEX DOCD: CPT | Mod: CPTII,,, | Performed by: NURSE PRACTITIONER

## 2024-12-02 PROCEDURE — 1159F MED LIST DOCD IN RCRD: CPT | Mod: CPTII,,, | Performed by: NURSE PRACTITIONER

## 2024-12-02 PROCEDURE — 3074F SYST BP LT 130 MM HG: CPT | Mod: CPTII,,, | Performed by: NURSE PRACTITIONER

## 2024-12-02 PROCEDURE — 3078F DIAST BP <80 MM HG: CPT | Mod: CPTII,,, | Performed by: NURSE PRACTITIONER

## 2024-12-02 PROCEDURE — 3044F HG A1C LEVEL LT 7.0%: CPT | Mod: CPTII,,, | Performed by: NURSE PRACTITIONER

## 2024-12-02 PROCEDURE — 99214 OFFICE O/P EST MOD 30 MIN: CPT | Mod: ,,, | Performed by: NURSE PRACTITIONER

## 2024-12-02 PROCEDURE — 1160F RVW MEDS BY RX/DR IN RCRD: CPT | Mod: CPTII,,, | Performed by: NURSE PRACTITIONER

## 2024-12-02 RX ORDER — PHENTERMINE HYDROCHLORIDE 37.5 MG/1
37.5 TABLET ORAL
Qty: 30 TABLET | Refills: 2 | Status: SHIPPED | OUTPATIENT
Start: 2024-12-02

## 2024-12-02 NOTE — PROGRESS NOTES
"Patient ID: Cynthia Raymond  : 1970    Chief Complaint: Dizziness (Follow up )    Allergies: Patient is allergic to tramadol.     History of Present Illness:  The patient is a 54 y.o. White female who presents to clinic for follow up on Dizziness (Follow up )     Here for follow up on her vertigo. She tells me that the Meclizine is working well. She tried the Epley maneuver at home as well.     She is miserable with her weight. She has been dieting and has been walking but has continued to gain weight. This began after her complete hysterectomy this past summer.     Social History:  reports that she has been smoking cigarettes. She started smoking about 21 years ago. She has a 11 pack-year smoking history. She has been exposed to tobacco smoke. She does not have any smokeless tobacco history on file. She reports that she does not currently use alcohol. She reports that she does not currently use drugs after having used the following drugs: "Crack" cocaine, Hydrocodone, Marijuana, and Methamphetamines.    Past Medical History:  has a past medical history of GERD (gastroesophageal reflux disease), Hypercholesteremia, Hypertension, MISAEL (iron deficiency anemia), Migraine headache, Nontraumatic rupture of tendon of left thumb, PID (pelvic inflammatory disease), RLS (restless legs syndrome), STD (sexually transmitted disease), Substance abuse, and Tobacco use.    Current Medications:  Current Outpatient Medications   Medication Instructions    albuterol (PROVENTIL/VENTOLIN HFA) 90 mcg/actuation inhaler 2 puffs, Inhalation, Every 6 hours PRN    atorvastatin (LIPITOR) 20 mg, Oral, Daily    busPIRone (BUSPAR) 15 mg, Oral, 3 times daily    DULoxetine (CYMBALTA) 60 mg, Oral, Daily, Take 1 capsule by mouth once daily.    ferrous sulfate (FEOSOL) 325 mg, Oral, Every other day    fluticasone propionate (FLONASE) 50 mcg/actuation nasal spray 1 spray, 2 times daily    gabapentin (NEURONTIN) 600 MG tablet Take 1 cap PO " "TID; take with 300 mg dose (total 900 mg TID)    gabapentin (NEURONTIN) 300 mg, 3 times daily    hydroCHLOROthiazide (HYDRODIURIL) 12.5 mg, Oral, Daily    ibuprofen (ADVIL,MOTRIN) 800 mg, Oral, 3 times daily    meclizine (ANTIVERT) 12.5 mg, Oral, 3 times daily PRN    mupirocin (BACTROBAN) 2 % ointment Topical (Top), 3 times daily    omeprazole (PRILOSEC) 20 mg, Oral, 2 times daily    sumatriptan (IMITREX) 25 mg, Oral, Daily PRN, Take 1 tablet by mouth daily as needed. May repeat dose after 2 hours up to a maximum of 200mg in 24 hours    tiZANidine (ZANAFLEX) 4 mg, Oral, 2 times daily PRN    VRAYLAR 1.5 mg, Oral, Daily       Review of Systems   See HPI    Visit Vitals  /78 (BP Location: Left arm, Patient Position: Sitting)   Pulse 89   Temp 97.9 °F (36.6 °C) (Temporal)   Ht 5' 2.99" (1.6 m)   Wt 103.4 kg (228 lb)   LMP 04/22/2024   SpO2 97%   BMI 40.40 kg/m²       Physical Exam  Vitals reviewed.   Constitutional:       Appearance: Normal appearance. She is obese.   Eyes:      Conjunctiva/sclera: Conjunctivae normal.   Cardiovascular:      Heart sounds: Normal heart sounds.   Pulmonary:      Breath sounds: Normal breath sounds.   Skin:     General: Skin is warm and dry.          Labs Reviewed:  Chemistry:  Lab Results   Component Value Date     10/28/2024    K 3.6 10/28/2024    BUN 15 10/28/2024    CREATININE 0.88 10/28/2024    EGFRNORACEVR 78 10/28/2024    GLUCOSE 119 (H) 10/28/2024    CALCIUM 9.7 10/28/2024    ALKPHOS 130 10/28/2024    LABPROT 7.1 10/28/2024    ALBUMIN 4.4 10/28/2024    AST 33 10/28/2024    ALT 51 (H) 10/28/2024    TSH 1.180 10/28/2024        Lab Results   Component Value Date    HGBA1C 5.9 10/28/2024        Hematology:  Lab Results   Component Value Date    WBC 7.01 10/28/2024    RBC 4.66 10/28/2024    HGB 13.5 10/28/2024    HCT 40.0 10/28/2024    MCV 85.8 10/28/2024    MCH 29.0 10/28/2024    MCHC 33.8 10/28/2024    RDW 13.6 10/28/2024     (H) 10/28/2024    MPV 9.6 10/28/2024 "       Lipid Panel:  Lab Results   Component Value Date    CHOL 184 08/24/2023    HDL 40 08/24/2023    LDLDIRECT 108.3 (H) 08/24/2023    TRIG 170 08/24/2023        Assessment & Plan:  1. Vertigo  Comments:  Continue Meclizine as needed.   Referral to ENT for further evaluation.  Orders:  -     Ambulatory referral/consult to ENT; Future; Expected date: 12/09/2024    2. IFG (impaired fasting glucose)  Overview:  Managed with lifestyle changes.       3. BMI 40.0-44.9, adult  Assessment & Plan:  Body mass index is 40.4 kg/m².  Goal BMI <30.  Exercise 5 times a week for 30 minutes per day.  Avoid soda, simple sugars, excessive rice, potatoes or bread. Limit fast foods and fried foods.  Choose complex carbs in moderation (example: green vegetables, beans, oatmeal). Eat plenty of fresh fruits and vegetables with lean meats daily.  Do not skip meals. Eat a balanced portion size.  Avoid fad diets. Consider permanent healthy life style changes.     Will propose Adipex-P to Dr Ceron to help with her weight loss.              Future Appointments   Date Time Provider Department Center   12/6/2024  9:30 AM LAB, [facility] LABORATORY DRAW STATION [facility]RA Hussein   12/11/2024 11:00 AM Susie, Margarita L., FNP-C [facility]   1/8/2025  2:15 PM [facility]       Follow up for 1 mo f/u, weight check, New meds. Call sooner if needed.    GENOVEVA Harding    Lab Frequency Next Occurrence   Ambulatory referral/consult to Gynecologic Oncology Once 05/28/2024   Lipid Panel Once 08/26/2024   Hemoglobin A1C Once 08/26/2024

## 2024-12-02 NOTE — ASSESSMENT & PLAN NOTE
Body mass index is 40.4 kg/m².  Goal BMI <30.  Exercise 5 times a week for 30 minutes per day.  Avoid soda, simple sugars, excessive rice, potatoes or bread. Limit fast foods and fried foods.  Choose complex carbs in moderation (example: green vegetables, beans, oatmeal). Eat plenty of fresh fruits and vegetables with lean meats daily.  Do not skip meals. Eat a balanced portion size.  Avoid fad diets. Consider permanent healthy life style changes.     Will propose Adipex-P to Dr Ceron to help with her weight loss.

## 2024-12-03 ENCOUNTER — PATIENT MESSAGE (OUTPATIENT)
Dept: FAMILY MEDICINE | Facility: CLINIC | Age: 54
End: 2024-12-03
Payer: MEDICAID

## 2024-12-10 ENCOUNTER — TELEPHONE (OUTPATIENT)
Dept: FAMILY MEDICINE | Facility: CLINIC | Age: 54
End: 2024-12-10
Payer: MEDICAID

## 2024-12-10 NOTE — TELEPHONE ENCOUNTER
I didn't see a request for a PA. It was not sent in EPIC and a fax was not received. I called [facility] and notified Yane. PA sent to insurance through [employer] online.

## 2024-12-10 NOTE — TELEPHONE ENCOUNTER
PA on CoverMyMeds came back approved for a quantity of 60/30 days. I called [facility] and notified Yane. She tried and it went through so they will notify pt.

## 2025-01-06 ENCOUNTER — OFFICE VISIT (OUTPATIENT)
Dept: FAMILY MEDICINE | Facility: CLINIC | Age: 55
End: 2025-01-06
Payer: MEDICAID

## 2025-01-06 ENCOUNTER — TELEPHONE (OUTPATIENT)
Dept: FAMILY MEDICINE | Facility: CLINIC | Age: 55
End: 2025-01-06

## 2025-01-06 VITALS
BODY MASS INDEX: 41.11 KG/M2 | DIASTOLIC BLOOD PRESSURE: 82 MMHG | HEIGHT: 63 IN | WEIGHT: 232 LBS | HEART RATE: 92 BPM | OXYGEN SATURATION: 98 % | TEMPERATURE: 98 F | SYSTOLIC BLOOD PRESSURE: 136 MMHG

## 2025-01-06 DIAGNOSIS — Z76.0 MEDICATION REFILL: ICD-10-CM

## 2025-01-06 DIAGNOSIS — J40 BRONCHITIS: Primary | ICD-10-CM

## 2025-01-06 DIAGNOSIS — R06.2 WHEEZING: ICD-10-CM

## 2025-01-06 DIAGNOSIS — J01.00 ACUTE NON-RECURRENT MAXILLARY SINUSITIS: ICD-10-CM

## 2025-01-06 PROCEDURE — 1159F MED LIST DOCD IN RCRD: CPT | Mod: CPTII,,, | Performed by: NURSE PRACTITIONER

## 2025-01-06 PROCEDURE — 3075F SYST BP GE 130 - 139MM HG: CPT | Mod: CPTII,,, | Performed by: NURSE PRACTITIONER

## 2025-01-06 PROCEDURE — 99214 OFFICE O/P EST MOD 30 MIN: CPT | Mod: ,,, | Performed by: NURSE PRACTITIONER

## 2025-01-06 PROCEDURE — 3079F DIAST BP 80-89 MM HG: CPT | Mod: CPTII,,, | Performed by: NURSE PRACTITIONER

## 2025-01-06 PROCEDURE — 3008F BODY MASS INDEX DOCD: CPT | Mod: CPTII,,, | Performed by: NURSE PRACTITIONER

## 2025-01-06 PROCEDURE — 1160F RVW MEDS BY RX/DR IN RCRD: CPT | Mod: CPTII,,, | Performed by: NURSE PRACTITIONER

## 2025-01-06 RX ORDER — PREDNISONE 20 MG/1
40 TABLET ORAL DAILY
Qty: 10 TABLET | Refills: 0 | Status: SHIPPED | OUTPATIENT
Start: 2025-01-06 | End: 2025-01-11

## 2025-01-06 RX ORDER — ALBUTEROL SULFATE 90 UG/1
2 INHALANT RESPIRATORY (INHALATION) EVERY 6 HOURS PRN
Qty: 18 G | Refills: 1 | Status: SHIPPED | OUTPATIENT
Start: 2025-01-06

## 2025-01-06 RX ORDER — CEFDINIR 300 MG/1
300 CAPSULE ORAL 2 TIMES DAILY
Qty: 20 CAPSULE | Refills: 0 | Status: SHIPPED | OUTPATIENT
Start: 2025-01-06 | End: 2025-01-16

## 2025-01-06 RX ORDER — IBUPROFEN 800 MG/1
800 TABLET ORAL 3 TIMES DAILY
Qty: 90 TABLET | Refills: 0 | Status: SHIPPED | OUTPATIENT
Start: 2025-01-06

## 2025-01-06 RX ORDER — MECLIZINE HCL 12.5 MG 12.5 MG/1
12.5 TABLET ORAL 3 TIMES DAILY PRN
Qty: 30 TABLET | Refills: 5 | Status: SHIPPED | OUTPATIENT
Start: 2025-01-06

## 2025-01-06 NOTE — PROGRESS NOTES
"   Patient ID: [id number]     Chief Complaint: Cough (Urgent Care dx with Bronchitis) and Fatigue      HPI:     Cynthia Raymond is a 54 y.o. female here today for a problem visit. Complaining of suspected fevers but not measured at home, dry cough, nasal blockage, sinus and nasal congestion, sore throat, and wheezing, some shortness of breath  x 14 days.  Denies fever, body aches, or CP. Went to  last week and told to have bronchitis; she was prescribed a zpack and completed that rx. She feels that her symptoms have actually worsened since finishing the abx. She feels very "drained" at this point. Taking Nyquil at home. Has not tried Mucinex. She went back to  again yesterday and was given Augmentin but it was not available for pickup.       Admits to no known sick contacts. . No other complaints today.     ROS: See HPI for details      Past Medical History:  has a past medical history of GERD (gastroesophageal reflux disease), Hypercholesteremia, Hypertension, MISAEL (iron deficiency anemia), Migraine headache, Nontraumatic rupture of tendon of left thumb, PID (pelvic inflammatory disease), RLS (restless legs syndrome), STD (sexually transmitted disease), Substance abuse, and Tobacco use.    Current Outpatient Medications   Medication Instructions    albuterol (PROVENTIL/VENTOLIN HFA) 90 mcg/actuation inhaler 2 puffs, Inhalation, Every 6 hours PRN    atorvastatin (LIPITOR) 20 mg, Oral, Daily    busPIRone (BUSPAR) 15 mg, Oral, 3 times daily    cefdinir (OMNICEF) 300 mg, Oral, 2 times daily    DULoxetine (CYMBALTA) 60 mg, Oral, Daily, Take 1 capsule by mouth once daily.    ferrous sulfate (FEOSOL) 325 mg, Oral, Every other day    fluticasone propionate (FLONASE) 50 mcg/actuation nasal spray 1 spray, 2 times daily    gabapentin (NEURONTIN) 600 MG tablet Take 1 cap PO TID; take with 300 mg dose (total 900 mg TID)    gabapentin (NEURONTIN) 300 mg, 3 times daily    hydroCHLOROthiazide (HYDRODIURIL) 12.5 mg, Oral, " "Daily    ibuprofen (ADVIL,MOTRIN) 800 mg, Oral, 3 times daily    meclizine (ANTIVERT) 12.5 mg, Oral, 3 times daily PRN    mupirocin (BACTROBAN) 2 % ointment Topical (Top), 3 times daily    omeprazole (PRILOSEC) 20 mg, Oral, 2 times daily    phentermine (ADIPEX-P) 37.5 mg, Oral, Before breakfast    predniSONE (DELTASONE) 40 mg, Oral, Daily    sumatriptan (IMITREX) 25 mg, Oral, Daily PRN, Take 1 tablet by mouth daily as needed. May repeat dose after 2 hours up to a maximum of 200mg in 24 hours    tiZANidine (ZANAFLEX) 4 mg, Oral, 2 times daily PRN    VRAYLAR 1.5 mg, Oral, Daily       Patient is allergic to tramadol.     Patient Care Team:  Margarita Richards FNP-C as PCP - General (Family Medicine)  Evan Mahajan OD as Consulting Physician (Optometry)  Parisa Mendoza NP as Nurse Practitioner (Obstetrics and Gynecology)  Hugh Quispe MD as Consulting Physician (Cardiology)  Yesenia Reyes MD (Obstetrics)  Vinay Vivas MD (Orthopedic Surgery)        Visit Vitals  /82 (BP Location: Right arm)   Pulse 92   Temp 98.1 °F (36.7 °C) (Temporal)   Ht 5' 2.99" (1.6 m)   Wt 105.2 kg (232 lb)   LMP 04/22/2024   SpO2 98%   BMI 41.11 kg/m²       Physical Exam  Vitals reviewed.   Constitutional:       Appearance: Normal appearance.   HENT:      Ears:      Comments: +erythematous Tms bilaterally     Nose: Congestion and rhinorrhea present.      Mouth/Throat:      Pharynx: Posterior oropharyngeal erythema present. No oropharyngeal exudate.   Eyes:      Conjunctiva/sclera: Conjunctivae normal.   Cardiovascular:      Heart sounds: Normal heart sounds.   Pulmonary:      Effort: No respiratory distress.      Breath sounds: Wheezing present. No rhonchi or rales.   Skin:     General: Skin is warm and dry.   Neurological:      Mental Status: She is oriented to person, place, and time.           Assessment:       ICD-10-CM ICD-9-CM   1. Bronchitis  J40 490   2. Wheezing  R06.2 786.07   3. Acute non-recurrent " maxillary sinusitis  J01.00 461.0   4. Medication refill  Z76.0 V68.1      CXR now to rule out Pneumonia  Prednisone 40 mg daily x5 days  Albuterol inhaler as directed  Cefdinir as directed    Start  Use OTC cold meds for symptoms (HTN pt to avoid Sudafed or pseudoephedrine products).  Use OTC Tylenol or Ibuprofen for fever or body aches.  Get plenty of rest, eat a healthy diet, avoid alcohol and smoking.  Sore Throat: Use OTC lozenges or throat sprays, gargle with warm salt and water, warm tea with honey.  Nasal Congestion: Use OTC Mucinex D, humidifier or steamy shower.  Cough: Use Dextromethorphan/Doxylamine Cough Syrup at night.  Report fever greater than 101 F, breathing problems, painful or worsening cough, or changes in mucous (green, yellow, bloody).  Cover your mouth with coughing, avoid sharing cups or lip balm, wash your hand before eating or touching your face.      No follow-ups on file. In addition to their scheduled follow up, the patient has also been instructed to follow up on as needed basis.     Future Appointments   Date Time Provider Department Center   1/8/2025  2:15 PM Margarita Richards FNP-C [facility]   1/22/2025  8:30 AM LAB, [facility] LABORATORY DRAW STATION [facility] Hussein   1/31/2025  9:30 AM Margarita Richards FNP-C [facility]

## 2025-01-13 ENCOUNTER — PATIENT MESSAGE (OUTPATIENT)
Dept: FAMILY MEDICINE | Facility: CLINIC | Age: 55
End: 2025-01-13
Payer: MEDICAID

## 2025-01-23 DIAGNOSIS — M51.16 LUMBAR DISC DISEASE WITH RADICULOPATHY: ICD-10-CM

## 2025-01-24 RX ORDER — TIZANIDINE 4 MG/1
4 TABLET ORAL 2 TIMES DAILY PRN
Qty: 60 TABLET | Refills: 1 | Status: SHIPPED | OUTPATIENT
Start: 2025-01-24

## 2025-02-19 DIAGNOSIS — E66.812 CLASS 2 OBESITY DUE TO EXCESS CALORIES WITHOUT SERIOUS COMORBIDITY WITH BODY MASS INDEX (BMI) OF 36.0 TO 36.9 IN ADULT: ICD-10-CM

## 2025-02-19 DIAGNOSIS — E66.09 CLASS 2 OBESITY DUE TO EXCESS CALORIES WITHOUT SERIOUS COMORBIDITY WITH BODY MASS INDEX (BMI) OF 36.0 TO 36.9 IN ADULT: ICD-10-CM

## 2025-02-19 RX ORDER — PHENTERMINE HYDROCHLORIDE 37.5 MG/1
37.5 TABLET ORAL
Qty: 30 TABLET | Refills: 2 | Status: SHIPPED | OUTPATIENT
Start: 2025-02-19

## 2025-02-19 RX ORDER — PHENTERMINE HYDROCHLORIDE 37.5 MG/1
37.5 TABLET ORAL
Qty: 30 TABLET | Refills: 2 | Status: CANCELLED | OUTPATIENT
Start: 2025-02-19

## 2025-02-19 NOTE — TELEPHONE ENCOUNTER
Patient was seen on 1/6/25 so it looks like you cancelled her 1mo follow up on 1/8. Her wellness labs were cancelled d/t the weather/snow so now she is scheduled for April to do her wellness. Is that ok for her medication refill or do you want her to come in before that?

## 2025-02-19 NOTE — TELEPHONE ENCOUNTER
I do not see that she ever filled Adipex.  So if she never ended up filling it she needs to return for a one-month follow-up after starting it.

## 2025-02-19 NOTE — TELEPHONE ENCOUNTER
Yes please propose to Dr. Barrientos 30 days with 2 refills and we will follow-up with her at her wellness in May

## 2025-02-19 NOTE — TELEPHONE ENCOUNTER
Correction. Margarita's note should say Dr. Ceron. Pt notified that if she misses her lab appointment in April that she can go to the hospital to have them drawn and to make sure she comes to her wellness on 5/1. Pt verbalized understanding.

## 2025-03-31 DIAGNOSIS — I10 PRIMARY HYPERTENSION: ICD-10-CM

## 2025-04-01 RX ORDER — HYDROCHLOROTHIAZIDE 12.5 MG/1
12.5 TABLET ORAL DAILY
Qty: 30 TABLET | Refills: 0 | Status: SHIPPED | OUTPATIENT
Start: 2025-04-01

## 2025-04-14 ENCOUNTER — TELEPHONE (OUTPATIENT)
Dept: FAMILY MEDICINE | Facility: CLINIC | Age: 55
End: 2025-04-14
Payer: MEDICAID

## 2025-04-14 NOTE — TELEPHONE ENCOUNTER
If the office is saying that she needs one, she will have to come in. They may also require one if they haven't seen her for that problem. She had scheduled an appointment on 4/1 but cancelled it.

## 2025-04-15 ENCOUNTER — TELEPHONE (OUTPATIENT)
Dept: FAMILY MEDICINE | Facility: CLINIC | Age: 55
End: 2025-04-15

## 2025-04-23 PROCEDURE — 85025 COMPLETE CBC W/AUTO DIFF WBC: CPT | Performed by: NURSE PRACTITIONER

## 2025-04-23 PROCEDURE — 80061 LIPID PANEL: CPT | Performed by: NURSE PRACTITIONER

## 2025-04-23 PROCEDURE — 80053 COMPREHEN METABOLIC PANEL: CPT | Performed by: NURSE PRACTITIONER

## 2025-04-23 PROCEDURE — 84443 ASSAY THYROID STIM HORMONE: CPT | Performed by: NURSE PRACTITIONER

## 2025-04-23 PROCEDURE — 83036 HEMOGLOBIN GLYCOSYLATED A1C: CPT | Performed by: NURSE PRACTITIONER

## 2025-04-24 ENCOUNTER — RESULTS FOLLOW-UP (OUTPATIENT)
Dept: FAMILY MEDICINE | Facility: CLINIC | Age: 55
End: 2025-04-24

## 2025-04-28 ENCOUNTER — TELEPHONE (OUTPATIENT)
Dept: FAMILY MEDICINE | Facility: CLINIC | Age: 55
End: 2025-04-28
Payer: MEDICAID

## 2025-04-28 DIAGNOSIS — M51.16 LUMBAR DISC DISEASE WITH RADICULOPATHY: ICD-10-CM

## 2025-04-28 RX ORDER — TIZANIDINE 4 MG/1
TABLET ORAL
Qty: 60 TABLET | Refills: 0 | Status: SHIPPED | OUTPATIENT
Start: 2025-04-28

## 2025-05-01 ENCOUNTER — OFFICE VISIT (OUTPATIENT)
Dept: FAMILY MEDICINE | Facility: CLINIC | Age: 55
End: 2025-05-01
Payer: MEDICAID

## 2025-05-01 VITALS
WEIGHT: 226.19 LBS | HEART RATE: 104 BPM | DIASTOLIC BLOOD PRESSURE: 88 MMHG | TEMPERATURE: 97 F | SYSTOLIC BLOOD PRESSURE: 132 MMHG | HEIGHT: 63 IN | BODY MASS INDEX: 40.08 KG/M2 | OXYGEN SATURATION: 97 %

## 2025-05-01 DIAGNOSIS — R73.01 IFG (IMPAIRED FASTING GLUCOSE): ICD-10-CM

## 2025-05-01 DIAGNOSIS — E66.01 CLASS 3 SEVERE OBESITY DUE TO EXCESS CALORIES WITH SERIOUS COMORBIDITY AND BODY MASS INDEX (BMI) OF 40.0 TO 44.9 IN ADULT: ICD-10-CM

## 2025-05-01 DIAGNOSIS — F41.1 GAD (GENERALIZED ANXIETY DISORDER): ICD-10-CM

## 2025-05-01 DIAGNOSIS — K21.9 GASTROESOPHAGEAL REFLUX DISEASE WITHOUT ESOPHAGITIS: ICD-10-CM

## 2025-05-01 DIAGNOSIS — M51.16 LUMBAR DISC DISEASE WITH RADICULOPATHY: ICD-10-CM

## 2025-05-01 DIAGNOSIS — I10 PRIMARY HYPERTENSION: ICD-10-CM

## 2025-05-01 DIAGNOSIS — E78.2 MIXED HYPERLIPIDEMIA: ICD-10-CM

## 2025-05-01 DIAGNOSIS — L98.9 SKIN LESIONS: ICD-10-CM

## 2025-05-01 DIAGNOSIS — Z12.11 COLON CANCER SCREENING: ICD-10-CM

## 2025-05-01 DIAGNOSIS — F19.11 HISTORY OF SUBSTANCE ABUSE: ICD-10-CM

## 2025-05-01 DIAGNOSIS — E66.813 CLASS 3 SEVERE OBESITY DUE TO EXCESS CALORIES WITH SERIOUS COMORBIDITY AND BODY MASS INDEX (BMI) OF 40.0 TO 44.9 IN ADULT: ICD-10-CM

## 2025-05-01 DIAGNOSIS — Z80.0 FAMILY HISTORY OF MALIGNANT NEOPLASM OF COLON: ICD-10-CM

## 2025-05-01 DIAGNOSIS — F33.42 RECURRENT MAJOR DEPRESSIVE DISORDER, IN FULL REMISSION: ICD-10-CM

## 2025-05-01 DIAGNOSIS — Z00.00 ENCOUNTER FOR WELL ADULT EXAM WITHOUT ABNORMAL FINDINGS: Primary | ICD-10-CM

## 2025-05-01 DIAGNOSIS — F50.811 MODERATE BINGE-EATING DISORDER: ICD-10-CM

## 2025-05-01 DIAGNOSIS — Z72.0 TOBACCO USER: ICD-10-CM

## 2025-05-01 PROBLEM — N39.0 URINARY TRACT INFECTION WITHOUT HEMATURIA: Status: RESOLVED | Noted: 2024-09-04 | Resolved: 2025-05-01

## 2025-05-01 PROBLEM — Z01.818 PREOP EXAMINATION: Status: RESOLVED | Noted: 2024-06-25 | Resolved: 2025-05-01

## 2025-05-01 RX ORDER — SULFAMETHOXAZOLE AND TRIMETHOPRIM 800; 160 MG/1; MG/1
1 TABLET ORAL 2 TIMES DAILY
COMMUNITY
Start: 2025-04-28

## 2025-05-01 RX ORDER — ATORVASTATIN CALCIUM 40 MG/1
40 TABLET, FILM COATED ORAL DAILY
Qty: 90 TABLET | Refills: 3 | Status: SHIPPED | OUTPATIENT
Start: 2025-05-01

## 2025-05-01 RX ORDER — GABAPENTIN 300 MG/1
300 CAPSULE ORAL 3 TIMES DAILY
Status: CANCELLED | OUTPATIENT
Start: 2025-05-01

## 2025-05-01 RX ORDER — TOPIRAMATE 50 MG/1
TABLET, FILM COATED ORAL
Qty: 60 TABLET | Refills: 0 | Status: SHIPPED | OUTPATIENT
Start: 2025-05-01

## 2025-05-01 RX ORDER — MUPIROCIN 20 MG/G
OINTMENT TOPICAL 3 TIMES DAILY
Qty: 30 G | Refills: 5 | Status: SHIPPED | OUTPATIENT
Start: 2025-05-01

## 2025-05-01 RX ORDER — HYDROCODONE BITARTRATE AND ACETAMINOPHEN 5; 325 MG/1; MG/1
1 TABLET ORAL EVERY 6 HOURS PRN
COMMUNITY
Start: 2025-04-28

## 2025-05-01 RX ORDER — MECLIZINE HYDROCHLORIDE 25 MG/1
25 TABLET ORAL 3 TIMES DAILY PRN
COMMUNITY
Start: 2025-02-27

## 2025-05-01 NOTE — PROGRESS NOTES
"Patient ID: Cynthia Raymond  : 1970    Chief Complaint: Annual Exam    Allergies: Patient is allergic to tramadol.     History of Present Illness:  The patient is a 54 y.o. White female who presents to clinic for annual wellness visit.      Feeling well in general, no health concerns or issues to discuss today.     She has been on Adipex since December; initially lost weight but has gained it all back. She is enquiring about an alternative to this drug.     Has small skin lesions on right buttocks that "keep breaking out then disappear."    Denies changes in bowel patterns, no melena, hematochezia, rectal pain, rectal bleeding, or changes in caliber of stool.  Has a family hx of colon cancer      Past Medical History:  has a past medical history of GERD (gastroesophageal reflux disease), Hypercholesteremia, Hypertension, MISAEL (iron deficiency anemia), Migraine headache, Nontraumatic rupture of tendon of left thumb, PID (pelvic inflammatory disease), RLS (restless legs syndrome), STD (sexually transmitted disease), Substance abuse, and Tobacco use.    Surgical History:  has a past surgical history that includes  section (); Shoulder arthroscopy (Right); Forearm surgery (Right); Cholecystectomy (2020); Knee arthroscopy (Left);  section ();  section with tubal ligation (); Dilation and curettage of uterus (); Carpal tunnel release (Bilateral, 2024); Tubal ligation; and Hysterectomy.    Family History: family history includes Arthritis in her father; Asthma in her father; Cancer in her mother; Colon cancer (age of onset: 72) in her mother; Depression in her sister; Diabetes in her mother; Heart disease in her father; Hypertension in her father.    Social History:  reports that she has been smoking cigarettes. She started smoking about 22 years ago. She has a 11.2 pack-year smoking history. She has been exposed to tobacco smoke. She does not have any " "smokeless tobacco history on file. She reports that she does not currently use alcohol. She reports that she does not currently use drugs after having used the following drugs: "Crack" cocaine, Hydrocodone, Marijuana, and Methamphetamines.    Care Team: Patient Care Team:  Margarita Richards FNP-C as PCP - General (Family Medicine)  Evan Mahajan, SUMA as Consulting Physician (Optometry)  Parisa Mendoza NP as Nurse Practitioner (Obstetrics and Gynecology)  Hugh Quispe MD as Consulting Physician (Cardiology)  Yesenia Reyes MD (Obstetrics)  Vinay Vivas MD (Orthopedic Surgery)     Current Medications:  Current Outpatient Medications   Medication Instructions    albuterol (PROVENTIL/VENTOLIN HFA) 90 mcg/actuation inhaler 2 puffs, Inhalation, Every 6 hours PRN    atorvastatin (LIPITOR) 40 mg, Oral, Daily    busPIRone (BUSPAR) 15 mg, Oral, 3 times daily    DULoxetine (CYMBALTA) 60 mg, Oral, Daily, Take 1 capsule by mouth once daily.    ferrous sulfate (FEOSOL) 325 mg, Oral, Every other day    gabapentin (NEURONTIN) 600 MG tablet Take 1 cap PO TID; take with 300 mg dose (total 900 mg TID)    hydroCHLOROthiazide 12.5 mg, Oral, Daily    HYDROcodone-acetaminophen (NORCO) 5-325 mg per tablet 1 tablet, Every 6 hours PRN    ibuprofen (ADVIL,MOTRIN) 800 mg, Oral, 3 times daily    meclizine (ANTIVERT) 25 mg, 3 times daily PRN    mupirocin (BACTROBAN) 2 % ointment Topical (Top), 3 times daily    omeprazole (PRILOSEC) 20 mg, Oral, 2 times daily    phentermine (ADIPEX-P) 37.5 mg, Oral, Before breakfast    sulfamethoxazole-trimethoprim 800-160mg (BACTRIM DS) 800-160 mg Tab 1 tablet, 2 times daily    sumatriptan (IMITREX) 25 mg, Oral, Daily PRN, Take 1 tablet by mouth daily as needed. May repeat dose after 2 hours up to a maximum of 200mg in 24 hours    tiZANidine (ZANAFLEX) 4 MG tablet TAKE 1 TABLET (4 MG TOTAL) BY MOUTH 2 (TWO) TIMES DAILY AS NEEDED FOR MUSCLE SPASMS *MAY CAUSE DROWSINESS*    topiramate " "(TOPAMAX) 50 MG tablet Week 1: Take 1/2 tablet in the morning. Week 2: Take 1/2 tablet in the morning and in the evening. Week 3: Take 1 tablet in the morning and 1/2 tablet in the evening. Week 4: 1 tablet in the morning and in the evening.       Review of Systems   Constitutional:  Negative for activity change, appetite change, fatigue and unexpected weight change.   HENT:  Negative for ear pain and hearing loss.    Eyes:  Negative for visual disturbance.   Respiratory:  Negative for apnea, cough, chest tightness, shortness of breath and wheezing.    Cardiovascular:  Negative for chest pain, palpitations, leg swelling and claudication.   Gastrointestinal:  Negative for abdominal pain, anal bleeding, blood in stool, change in bowel habit, nausea, vomiting and reflux.   Endocrine: Negative for cold intolerance, heat intolerance, polydipsia, polyphagia and polyuria.   Genitourinary:  Negative for dysuria, frequency, hematuria and urgency.   Musculoskeletal:  Negative for arthralgias.   Integumentary:  Negative for rash and mole/lesion.   Allergic/Immunologic: Negative for environmental allergies.   Neurological:  Negative for dizziness, headaches and memory loss.          Visit Vitals  /88 (BP Location: Right arm, Patient Position: Sitting)   Pulse 104   Temp 97.2 °F (36.2 °C) (Temporal)   Ht 5' 2.99" (1.6 m)   Wt 102.6 kg (226 lb 3.2 oz)   LMP 04/22/2024   SpO2 97%   BMI 40.08 kg/m²       Physical Exam  Vitals reviewed.   Constitutional:       Appearance: Normal appearance. She is obese.   Eyes:      Conjunctiva/sclera: Conjunctivae normal.   Cardiovascular:      Rate and Rhythm: Normal rate and regular rhythm.      Pulses: Normal pulses.      Heart sounds: Normal heart sounds.   Pulmonary:      Effort: Pulmonary effort is normal.      Breath sounds: Normal breath sounds.   Abdominal:      General: Bowel sounds are normal.      Palpations: Abdomen is soft.   Musculoskeletal:      Cervical back: Neck supple. "   Skin:     General: Skin is warm and dry.      Capillary Refill: Capillary refill takes less than 2 seconds.   Neurological:      Mental Status: She is alert and oriented to person, place, and time.   Psychiatric:         Mood and Affect: Mood normal.         Behavior: Behavior normal.          Labs Reviewed:  Chemistry:  Lab Results   Component Value Date     04/23/2025    K 3.7 04/23/2025    BUN 14 04/23/2025    CREATININE 0.62 (L) 04/23/2025    EGFRNORACEVR >90 04/23/2025    CALCIUM 9.4 04/23/2025    ALKPHOS 113 04/23/2025    ALBUMIN 4.6 04/23/2025    AST 25 04/23/2025    ALT 42 04/23/2025    TSH 1.920 04/23/2025        Lab Results   Component Value Date    HGBA1C 6.1 (H) 04/23/2025        Hematology:  Lab Results   Component Value Date    WBC 12.93 (H) 04/23/2025    RBC 5.19 (H) 04/23/2025    HGB 15.4 04/23/2025    HCT 44.5 04/23/2025    MCV 85.7 04/23/2025    MCH 29.7 04/23/2025    MCHC 34.6 04/23/2025    RDW 13.5 04/23/2025     (H) 04/23/2025    MPV 9.7 04/23/2025       Lipid Panel:  Lab Results   Component Value Date    CHOL 248 (H) 04/23/2025    HDL 63 (H) 04/23/2025    LDLDIRECT 164.8 (H) 04/23/2025    TRIG 142 04/23/2025        Assessment & Plan:  1. Encounter for well adult exam without abnormal findings  -     CBC Auto Differential; Future; Expected date: 05/01/2026  -     Comprehensive Metabolic Panel; Future; Expected date: 05/01/2026  -     Lipid Panel; Future; Expected date: 05/01/2026  -     TSH; Future; Expected date: 05/01/2026  -     Hemoglobin A1C; Future; Expected date: 05/01/2026  -     Vitamin D; Future; Expected date: 05/01/2026    2. Primary hypertension  Overview:  On HCTZ 25 mg daily.     Assessment & Plan:  Well controlled.   Continue current regimen.   Low Sodium Diet (DASH Diet - Less than 2 grams of sodium per day).  Monitor blood pressure daily and log. Report consistent numbers greater than 140/90.  Maintain healthy weight with goal BMI <30. Exercise 30 minutes per  day, 5 days per week.  Smoking cessation encouraged to aid in BP reduction.        3. Mixed hyperlipidemia  Overview:  On Atorvastatin 20 mg daily.     Assessment & Plan:  Lab Results   Component Value Date    CHOL 248 (H) 04/23/2025    HDL 63 (H) 04/23/2025    LDLDIRECT 164.8 (H) 04/23/2025    TRIG 142 04/23/2025     Not at goal; increase Atorvastatin to 40 mg daily.   LDL Goal: 100  Educated on dietary modifications. Follow a low cholesterol, low saturated fat diet with less that 200mg of cholesterol a day.  Avoid fried foods and high saturated fats.  Increase dietary fiber.  Regular exercise can reduce LDL (bad cholesterol) and raise HDL (good cholesterol). Encourage physical activity 5 times per week for 30 minutes per day.       Orders:  -     atorvastatin (LIPITOR) 40 MG tablet; Take 1 tablet (40 mg total) by mouth once daily.  Dispense: 90 tablet; Refill: 3    4. IFG (impaired fasting glucose)  Overview:  Managed with lifestyle changes.   A1c 5.7% (05/2024)      Assessment & Plan:  Diabetes labs:   Lab Results   Component Value Date    HGBA1C 6.1 (H) 04/23/2025      Follow American Diabetic Association (ADA) dietary guidelines for eating and implement exercise into your daily regimen.         5. Lumbar disc disease with radiculopathy  Overview:  On Gabapentin 600 mg TID, Zanaflex TID and Cymbalta 60 mg daily.     Assessment & Plan:  Reduce dose of Gabapentin to 600 mg TID      6. Class 3 severe obesity due to excess calories with serious comorbidity and body mass index (BMI) of 40.0 to 44.9 in adult  Assessment & Plan:  Body mass index is 40.08 kg/m².  Goal BMI <30.  Will continue with Adipex and start Topirimate as directed. Follow up 1 month.  Exercise 5 times a week for 30 minutes per day.  Avoid soda, simple sugars, excessive rice, potatoes or bread. Limit fast foods and fried foods.  Choose complex carbs in moderation (example: green vegetables, beans, oatmeal). Eat plenty of fresh fruits and vegetables  with lean meats daily.  Do not skip meals. Eat a balanced portion size.  Avoid fad diets. Consider permanent healthy life style changes.         7. Gastroesophageal reflux disease without esophagitis  Overview:  On Omeprazole 20 mg BID.    Assessment & Plan:  Take medication: as needed  Preventive Measures:   Avoid spicy, acidic, fried foods and alcohol.  Eat 2-3 hours before going to bed. Remain upright for 30-60 minutes after eating.   Avoid tight clothing, chew food thoroughly.  Reduce caffeine intake, avoid soda.  Stressed importance of Smoking/Tobacco Cessation.        8. Moderate binge-eating disorder  Comments:  start Topirimate  Overview:  start Topirimate    Orders:  -     topiramate (TOPAMAX) 50 MG tablet; Week 1: Take 1/2 tablet in the morning. Week 2: Take 1/2 tablet in the morning and in the evening. Week 3: Take 1 tablet in the morning and 1/2 tablet in the evening. Week 4: 1 tablet in the morning and in the evening.  Dispense: 60 tablet; Refill: 0    9. Skin lesions  Comments:  Mupiricin to lesions.  Orders:  -     mupirocin (BACTROBAN) 2 % ointment; Apply topically 3 (three) times daily.  Dispense: 30 g; Refill: 5    10. Tobacco user  Overview:  1/2 PPD    Assessment & Plan:  Encouraged to stop smoking. Discussed negative impact of smoking on health. Offered support in terms of prescription medications to help with quitting if needed. Educated patient on need to identify triggers for cigarette smoking and to find an alternative to alleviate these triggers such as walking, eating unsalted sunflower seeds, eating carrots. Advised patient to develop a plan to quit smoking whether it is to decrease by a few cigarettes every 3-5 days or quit cold turkey and to schedule a quit day. Patient states understanding. Spent > 3 minutes with discussion.         11. Colon cancer screening  -     Ambulatory referral/consult to Family [facility] COLONOSCOPY; Future; Expected date: 05/08/2025    12. Family history of  malignant neoplasm of colon  Overview:  Mother  at 72 y/o with colorectal cancer  Mother  at 72 y/o with colorectal cancer    Mother  at 72 y/o with colorectal cancer      13. LAM (generalized anxiety disorder)  Overview:  Buspar 15 mg TID and Cymbalta 60 mg daily.     Assessment & Plan:  Stable; continue current therapy.         14. Recurrent major depressive disorder, in full remission  Overview:  On Cymbalta 60 mg daily and Buspirone 15 mg TID.      Assessment & Plan:  Stable; continue current therapy.         15. History of substance abuse  Overview:  Clean x 5 years; previous frequent cocaine and Meth use.           Cervical Cancer Screening-UTD  Breast Cancer Screening-mammo ordered  Osteoporosis Screening-not due  Colon Cancer Screening -  referral sent to Dr Saldana  Eye Exam- Recommend annually.  Dental Exam- Recommend biannually.    Vaccinations:  Immunization History   Administered Date(s) Administered    COVID-19 MRNA, LN-S PF (MODERNA HALF 0.25 ML DOSE) 2022    COVID-19, MRNA, LN-S, PF (MODERNA FULL 0.5 ML DOSE) 2021, 2021    Influenza - Quadrivalent 2020, 2021    Influenza - Quadrivalent - PF *Preferred* (6 months and older) 10/10/2017, 2020, 2021, 2023    Influenza - Trivalent - Fluarix, Flulaval, Fluzone, Afluria - PF 10/10/2017, 2019, 10/11/2024    Pneumococcal Conjugate - 13 Valent 2017    Tdap 2020, 2021    Zoster Recombinant 2023       Future Appointments   Date Time Provider Department Center   2025  2:45 PM OA MAMMO1 [facility]   6/3/2025  3:00 PM Margarita Richards, [facility] [place] [facility]   2026  8:20 AM LAB, [facility] [place] LABORATORY DRAW STATION [facility] [place] [facility]   2026  3:00 PM Margarita Richards, [facility] [place] [facility]       Follow up for 1), 1 mo f/u, weight check, 2), 1 year, Wellness, Fasting labs prior. Call sooner if needed.    Margarita Richards,  FNP-C    Lab Frequency Next Occurrence   Ambulatory referral/consult to Gynecologic Oncology Once 05/28/2024   Ambulatory referral/consult to ENT Once 12/09/2024   Mammo Digital Screening Bilat w/ Bigg (XPD) Once 03/31/2025

## 2025-05-01 NOTE — ASSESSMENT & PLAN NOTE
Diabetes labs:   Lab Results   Component Value Date    HGBA1C 6.1 (H) 04/23/2025      Follow American Diabetic Association (ADA) dietary guidelines for eating and implement exercise into your daily regimen.

## 2025-05-01 NOTE — ASSESSMENT & PLAN NOTE
Lab Results   Component Value Date    CHOL 248 (H) 04/23/2025    HDL 63 (H) 04/23/2025    LDLDIRECT 164.8 (H) 04/23/2025    TRIG 142 04/23/2025     Not at goal; increase Atorvastatin to 40 mg daily.   LDL Goal: 100  Educated on dietary modifications. Follow a low cholesterol, low saturated fat diet with less that 200mg of cholesterol a day.  Avoid fried foods and high saturated fats.  Increase dietary fiber.  Regular exercise can reduce LDL (bad cholesterol) and raise HDL (good cholesterol). Encourage physical activity 5 times per week for 30 minutes per day.

## 2025-05-01 NOTE — ASSESSMENT & PLAN NOTE
Body mass index is 40.08 kg/m².  Goal BMI <30.  Will continue with Adipex and start Topirimate as directed. Follow up 1 month.  Exercise 5 times a week for 30 minutes per day.  Avoid soda, simple sugars, excessive rice, potatoes or bread. Limit fast foods and fried foods.  Choose complex carbs in moderation (example: green vegetables, beans, oatmeal). Eat plenty of fresh fruits and vegetables with lean meats daily.  Do not skip meals. Eat a balanced portion size.  Avoid fad diets. Consider permanent healthy life style changes.

## 2025-05-04 DIAGNOSIS — I10 PRIMARY HYPERTENSION: ICD-10-CM

## 2025-05-05 ENCOUNTER — PATIENT MESSAGE (OUTPATIENT)
Dept: FAMILY MEDICINE | Facility: CLINIC | Age: 55
End: 2025-05-05
Payer: MEDICAID

## 2025-05-05 RX ORDER — HYDROCHLOROTHIAZIDE 12.5 MG/1
12.5 TABLET ORAL DAILY
Qty: 30 TABLET | Refills: 0 | Status: SHIPPED | OUTPATIENT
Start: 2025-05-05

## 2025-05-12 ENCOUNTER — OFFICE VISIT (OUTPATIENT)
Dept: FAMILY MEDICINE | Facility: CLINIC | Age: 55
End: 2025-05-12
Payer: MEDICAID

## 2025-05-12 ENCOUNTER — HOSPITAL ENCOUNTER (EMERGENCY)
Facility: HOSPITAL | Age: 55
Discharge: HOME | End: 2025-05-12
Payer: MEDICAID

## 2025-05-12 VITALS
SYSTOLIC BLOOD PRESSURE: 131 MMHG | OXYGEN SATURATION: 99 % | BODY MASS INDEX: 39.09 KG/M2 | DIASTOLIC BLOOD PRESSURE: 75 MMHG | HEIGHT: 64 IN | TEMPERATURE: 98 F | RESPIRATION RATE: 18 BRPM | HEART RATE: 72 BPM | WEIGHT: 229 LBS

## 2025-05-12 VITALS
DIASTOLIC BLOOD PRESSURE: 80 MMHG | SYSTOLIC BLOOD PRESSURE: 136 MMHG | HEART RATE: 109 BPM | TEMPERATURE: 98 F | OXYGEN SATURATION: 97 %

## 2025-05-12 DIAGNOSIS — R05.9 COUGH: ICD-10-CM

## 2025-05-12 DIAGNOSIS — J40 BRONCHITIS: Primary | ICD-10-CM

## 2025-05-12 DIAGNOSIS — F17.200 NEEDS SMOKING CESSATION EDUCATION: ICD-10-CM

## 2025-05-12 DIAGNOSIS — Z72.0 TOBACCO USER: ICD-10-CM

## 2025-05-12 DIAGNOSIS — R06.00 DYSPNEA: ICD-10-CM

## 2025-05-12 LAB
ALBUMIN SERPL-MCNC: 4.3 G/DL (ref 3.4–5)
ALBUMIN/GLOB SERPL: 1.4 RATIO
ALP SERPL-CCNC: 122 UNIT/L (ref 50–144)
ALT SERPL-CCNC: 37 UNIT/L (ref 1–45)
AMPHET UR QL SCN: NEGATIVE
ANION GAP SERPL CALC-SCNC: 3 MEQ/L (ref 2–13)
AST SERPL-CCNC: 29 UNIT/L (ref 14–36)
BARBITURATE SCN PRESENT UR: NEGATIVE
BASOPHILS # BLD AUTO: 0.07 X10(3)/MCL (ref 0.01–0.08)
BASOPHILS NFR BLD AUTO: 0.7 % (ref 0.1–1.2)
BENZODIAZ UR QL SCN: NEGATIVE
BILIRUB SERPL-MCNC: 0.5 MG/DL (ref 0–1)
BILIRUB UR QL STRIP.AUTO: NEGATIVE
BNP BLD-MCNC: <20 PG/ML (ref 0–124.9)
BUN SERPL-MCNC: 22 MG/DL (ref 7–20)
CALCIUM SERPL-MCNC: 9.3 MG/DL (ref 8.4–10.2)
CANNABINOIDS UR QL SCN: NEGATIVE
CHLORIDE SERPL-SCNC: 105 MMOL/L (ref 98–110)
CLARITY UR: ABNORMAL
CO2 SERPL-SCNC: 28 MMOL/L (ref 21–32)
COCAINE UR QL SCN: NEGATIVE
COLOR UR AUTO: YELLOW
CREAT SERPL-MCNC: 1.3 MG/DL (ref 0.66–1.25)
CREAT/UREA NIT SERPL: 17 (ref 12–20)
EOSINOPHIL # BLD AUTO: 0.24 X10(3)/MCL (ref 0.04–0.36)
EOSINOPHIL NFR BLD AUTO: 2.4 % (ref 0.7–7)
ERYTHROCYTE [DISTWIDTH] IN BLOOD BY AUTOMATED COUNT: 14 % (ref 11–14.5)
GFR SERPLBLD CREATININE-BSD FMLA CKD-EPI: 49 ML/MIN/1.73/M2
GLOBULIN SER-MCNC: 3 GM/DL (ref 2–3.9)
GLUCOSE SERPL-MCNC: 119 MG/DL (ref 70–115)
GLUCOSE UR QL STRIP: NEGATIVE
HCT VFR BLD AUTO: 43.3 % (ref 36–48)
HGB BLD-MCNC: 14.7 G/DL (ref 11.8–16)
HGB UR QL STRIP: NEGATIVE
IMM GRANULOCYTES # BLD AUTO: 0.06 X10(3)/MCL (ref 0–0.03)
IMM GRANULOCYTES NFR BLD AUTO: 0.6 % (ref 0–0.5)
KETONES UR QL STRIP: ABNORMAL
LEUKOCYTE ESTERASE UR QL STRIP: NEGATIVE
LIPASE SERPL-CCNC: 51 U/L (ref 23–300)
LYMPHOCYTES # BLD AUTO: 2.45 X10(3)/MCL (ref 1.16–3.74)
LYMPHOCYTES NFR BLD AUTO: 24.5 % (ref 20–55)
MCH RBC QN AUTO: 29.6 PG (ref 27–34)
MCHC RBC AUTO-ENTMCNC: 33.9 G/DL (ref 31–37)
MCV RBC AUTO: 87.1 FL (ref 79–99)
METHADONE UR QL SCN: NEGATIVE
MONOCYTES # BLD AUTO: 1.05 X10(3)/MCL (ref 0.24–0.36)
MONOCYTES NFR BLD AUTO: 10.5 % (ref 4.7–12.5)
NEUTROPHILS # BLD AUTO: 6.12 X10(3)/MCL (ref 1.56–6.13)
NEUTROPHILS NFR BLD AUTO: 61.3 % (ref 37–73)
NITRITE UR QL STRIP: NEGATIVE
NRBC BLD AUTO-RTO: 0 %
OHS QRS DURATION: 70 MS
OHS QTC CALCULATION: 434 MS
OPIATES UR QL SCN: NEGATIVE
PCP UR QL: NEGATIVE
PH UR STRIP: 7 [PH]
PH UR: 7 [PH] (ref 5–8)
PLATELET # BLD AUTO: 404 X10(3)/MCL (ref 140–371)
PMV BLD AUTO: 9.4 FL (ref 9.4–12.4)
POTASSIUM SERPL-SCNC: 3.1 MMOL/L (ref 3.5–5.1)
PROT SERPL-MCNC: 7.3 GM/DL (ref 6.3–8.2)
PROT UR QL STRIP: NEGATIVE
RBC # BLD AUTO: 4.97 X10(6)/MCL (ref 4–5.1)
SODIUM SERPL-SCNC: 136 MMOL/L (ref 136–145)
SP GR UR STRIP.AUTO: 1.01 (ref 1–1.03)
TROPONIN I SERPL-MCNC: <0.012 NG/ML (ref 0–0.03)
UROBILINOGEN UR STRIP-ACNC: 2
WBC # BLD AUTO: 9.99 X10(3)/MCL (ref 4–11.5)

## 2025-05-12 PROCEDURE — 3075F SYST BP GE 130 - 139MM HG: CPT | Mod: CPTII,,, | Performed by: NURSE PRACTITIONER

## 2025-05-12 PROCEDURE — 3044F HG A1C LEVEL LT 7.0%: CPT | Mod: CPTII,,, | Performed by: NURSE PRACTITIONER

## 2025-05-12 PROCEDURE — 81003 URINALYSIS AUTO W/O SCOPE: CPT

## 2025-05-12 PROCEDURE — 80307 DRUG TEST PRSMV CHEM ANLYZR: CPT

## 2025-05-12 PROCEDURE — 1159F MED LIST DOCD IN RCRD: CPT | Mod: CPTII,,, | Performed by: NURSE PRACTITIONER

## 2025-05-12 PROCEDURE — 94640 AIRWAY INHALATION TREATMENT: CPT

## 2025-05-12 PROCEDURE — 93010 ELECTROCARDIOGRAM REPORT: CPT | Mod: ,,, | Performed by: INTERNAL MEDICINE

## 2025-05-12 PROCEDURE — 99213 OFFICE O/P EST LOW 20 MIN: CPT | Mod: ,,, | Performed by: NURSE PRACTITIONER

## 2025-05-12 PROCEDURE — 25000003 PHARM REV CODE 250

## 2025-05-12 PROCEDURE — 83880 ASSAY OF NATRIURETIC PEPTIDE: CPT

## 2025-05-12 PROCEDURE — 99285 EMERGENCY DEPT VISIT HI MDM: CPT | Mod: 25

## 2025-05-12 PROCEDURE — 1160F RVW MEDS BY RX/DR IN RCRD: CPT | Mod: CPTII,,, | Performed by: NURSE PRACTITIONER

## 2025-05-12 PROCEDURE — 93005 ELECTROCARDIOGRAM TRACING: CPT

## 2025-05-12 PROCEDURE — 63600175 PHARM REV CODE 636 W HCPCS

## 2025-05-12 PROCEDURE — 80053 COMPREHEN METABOLIC PANEL: CPT

## 2025-05-12 PROCEDURE — 84484 ASSAY OF TROPONIN QUANT: CPT

## 2025-05-12 PROCEDURE — 3079F DIAST BP 80-89 MM HG: CPT | Mod: CPTII,,, | Performed by: NURSE PRACTITIONER

## 2025-05-12 PROCEDURE — 96372 THER/PROPH/DIAG INJ SC/IM: CPT

## 2025-05-12 PROCEDURE — 94761 N-INVAS EAR/PLS OXIMETRY MLT: CPT

## 2025-05-12 PROCEDURE — 83690 ASSAY OF LIPASE: CPT

## 2025-05-12 PROCEDURE — 25000242 PHARM REV CODE 250 ALT 637 W/ HCPCS

## 2025-05-12 PROCEDURE — 85025 COMPLETE CBC W/AUTO DIFF WBC: CPT

## 2025-05-12 RX ORDER — POTASSIUM CHLORIDE 20 MEQ/1
40 TABLET, EXTENDED RELEASE ORAL
Status: COMPLETED | OUTPATIENT
Start: 2025-05-12 | End: 2025-05-12

## 2025-05-12 RX ORDER — IPRATROPIUM BROMIDE AND ALBUTEROL SULFATE 2.5; .5 MG/3ML; MG/3ML
3 SOLUTION RESPIRATORY (INHALATION)
Status: COMPLETED | OUTPATIENT
Start: 2025-05-12 | End: 2025-05-12

## 2025-05-12 RX ORDER — BENZONATATE 100 MG/1
200 CAPSULE ORAL 3 TIMES DAILY PRN
Qty: 20 CAPSULE | Refills: 0 | Status: SHIPPED | OUTPATIENT
Start: 2025-05-12 | End: 2025-05-22

## 2025-05-12 RX ORDER — DEXAMETHASONE SODIUM PHOSPHATE 4 MG/ML
12 INJECTION, SOLUTION INTRA-ARTICULAR; INTRALESIONAL; INTRAMUSCULAR; INTRAVENOUS; SOFT TISSUE
Status: COMPLETED | OUTPATIENT
Start: 2025-05-12 | End: 2025-05-12

## 2025-05-12 RX ORDER — ALBUTEROL SULFATE 0.63 MG/3ML
0.63 SOLUTION RESPIRATORY (INHALATION) EVERY 6 HOURS PRN
Qty: 75 ML | Refills: 0 | Status: SHIPPED | OUTPATIENT
Start: 2025-05-12 | End: 2026-05-12

## 2025-05-12 RX ORDER — ALBUTEROL SULFATE 90 UG/1
2 INHALANT RESPIRATORY (INHALATION) EVERY 6 HOURS PRN
Qty: 18 G | Refills: 0 | Status: SHIPPED | OUTPATIENT
Start: 2025-05-12 | End: 2026-05-12

## 2025-05-12 RX ORDER — BENZONATATE 100 MG/1
200 CAPSULE ORAL ONCE
Status: COMPLETED | OUTPATIENT
Start: 2025-05-12 | End: 2025-05-12

## 2025-05-12 RX ORDER — ASPIRIN/CALCIUM CARB/MAGNESIUM 325 MG
TABLET ORAL
Qty: 270 LOZENGE | Refills: 0 | Status: SHIPPED | OUTPATIENT
Start: 2025-05-12

## 2025-05-12 RX ADMIN — BENZONATATE 200 MG: 100 CAPSULE ORAL at 02:05

## 2025-05-12 RX ADMIN — IPRATROPIUM BROMIDE AND ALBUTEROL SULFATE 3 ML: .5; 3 SOLUTION RESPIRATORY (INHALATION) at 02:05

## 2025-05-12 RX ADMIN — POTASSIUM CHLORIDE 40 MEQ: 1500 TABLET, EXTENDED RELEASE ORAL at 02:05

## 2025-05-12 RX ADMIN — SODIUM CHLORIDE 1000 ML: 9 INJECTION, SOLUTION INTRAVENOUS at 02:05

## 2025-05-12 RX ADMIN — DEXAMETHASONE SODIUM PHOSPHATE 12 MG: 4 INJECTION, SOLUTION INTRA-ARTICULAR; INTRALESIONAL; INTRAMUSCULAR; INTRAVENOUS; SOFT TISSUE at 01:05

## 2025-05-12 NOTE — ED PROVIDER NOTES
Encounter Date: 2025       History     Chief Complaint   Patient presents with    Abdominal Pain    Cough    Back Pain     COUGH, ABD, LUNG, AND KIDNEY PAIN SINCE YESTERDAY AM     54-year-old female arrives via EMS with complaints of cough and shortness of breath.  She started coughing yesterday, and it got worse tonight.  She used her albuterol inhaler, but it did not help.  Her stomach hurts when she coughs.  She is a heavy smoker.  She denies any fever or chills.    The history is provided by the patient.     Review of patient's allergies indicates:   Allergen Reactions    Tramadol Itching     Past Medical History:   Diagnosis Date    Anxiety disorder, unspecified     GERD (gastroesophageal reflux disease)     Hypercholesteremia     Hypertension     MISAEL (iron deficiency anemia)     Migraine headache     Nontraumatic rupture of tendon of left thumb 2023    MRI pending (23)      PID (pelvic inflammatory disease)     RLS (restless legs syndrome)     STD (sexually transmitted disease)     Substance abuse     Tobacco use      Past Surgical History:   Procedure Laterality Date    CARPAL TUNNEL RELEASE Bilateral 2024     SECTION       SECTION       SECTION WITH TUBAL LIGATION  1996    CHOLECYSTECTOMY  2020    DILATION AND CURETTAGE OF UTERUS  1990    FOREARM SURGERY Right     Fractured    HYSTERECTOMY      KNEE ARTHROSCOPY Left     SHOULDER ARTHROSCOPY Right     TUBAL LIGATION       Family History   Problem Relation Name Age of Onset    Hypertension Father Daveavril Mortone     Heart disease Father Dave Mortone     Arthritis Father Dave [place]     Asthma Father Dave Mortone     Colon cancer Mother Marina Parks 72    Cancer Mother Marina Parks     Diabetes Mother Marina Parks     Depression Sister      Breast cancer Neg Hx      Ovarian cancer Neg Hx      Uterine cancer Neg Hx      Cervical cancer Neg Hx       Social History[1]  Review of Systems    Constitutional:  Negative for fever.   HENT:  Negative for sore throat.    Respiratory:  Positive for cough, chest tightness and shortness of breath.    Cardiovascular:  Negative for chest pain.   Gastrointestinal:  Positive for abdominal pain. Negative for nausea.   Genitourinary:  Negative for dysuria.   Musculoskeletal:  Negative for back pain.   Skin:  Negative for rash.   Neurological:  Negative for weakness.   Hematological:  Does not bruise/bleed easily.   All other systems reviewed and are negative.      Physical Exam     Initial Vitals [05/12/25 0059]   BP Pulse Resp Temp SpO2   (!) 124/97 86 (!) 24 98.7 °F (37.1 °C) 99 %      MAP       --         Physical Exam    Nursing note and vitals reviewed.  Constitutional: Vital signs are normal. She appears well-developed and well-nourished. She is cooperative.   HENT:   Head: Normocephalic and atraumatic. Mouth/Throat: Oropharynx is clear and moist.   Eyes: Conjunctivae, EOM and lids are normal. Pupils are equal, round, and reactive to light.   Neck: Trachea normal. Neck supple.   Normal range of motion.  Cardiovascular:  Normal rate, regular rhythm, normal heart sounds and intact distal pulses.           Pulmonary/Chest: Breath sounds normal.   Abdominal: Abdomen is soft. Bowel sounds are normal.   Musculoskeletal:         General: Normal range of motion.      Cervical back: Normal, normal range of motion and neck supple.      Lumbar back: Normal.     Neurological: She is alert and oriented to person, place, and time. She has normal strength. Coordination normal. GCS score is 15. GCS eye subscore is 4. GCS verbal subscore is 5. GCS motor subscore is 6.   Skin: Skin is warm, dry and intact. Capillary refill takes less than 2 seconds.   Psychiatric: Her speech is normal and behavior is normal. Judgment and thought content normal. Cognition and memory are normal.   Anxious         ED Course   Procedures  Labs Reviewed   URINALYSIS, REFLEX TO URINE CULTURE -  Abnormal       Result Value    Color, UA Yellow      Appearance, UA Slightly Cloudy (*)     Specific Gravity, UA 1.015      pH, UA 7.0      Protein, UA Negative      Glucose, UA Negative      Ketones, UA Trace (*)     Blood, UA Negative      Bilirubin, UA Negative      Urobilinogen, UA 2.0 (*)     Nitrites, UA Negative      Leukocyte Esterase, UA Negative      Narrative:      URINE STABILITY IS 2 HOURS AT ROOM TEMP OR    SIX HOURS REFRIGERATED. PERFORMING TESTING ON    SPECIMENS GREATER THAN THIS AGE MAY AFFECT THE    FOLLOWING TESTS:    PH          SPECIFIC GRAVITY           BLOOD    CLARITY     BILIRUBIN               UROBILINOGEN   COMPREHENSIVE METABOLIC PANEL - Abnormal    Sodium 136      Potassium 3.1 (*)     Chloride 105      CO2 28      Glucose 119 (*)     Blood Urea Nitrogen 22 (*)     Creatinine 1.30 (*)     Calcium 9.3      Protein Total 7.3      Albumin 4.3      Globulin 3.0      Albumin/Globulin Ratio 1.4      Bilirubin Total 0.5            ALT 37      AST 29      eGFR 49      Anion Gap 3.0      BUN/Creatinine Ratio 17     CBC WITH DIFFERENTIAL - Abnormal    WBC 9.99      RBC 4.97      Hgb 14.7      Hct 43.3      MCV 87.1      MCH 29.6      MCHC 33.9      RDW 14.0      Platelet 404 (*)     MPV 9.4      Neut % 61.3      Lymph % 24.5      Mono % 10.5      Eos % 2.4      Basophil % 0.7      Lymph # 2.45      Neut # 6.12      Mono # 1.05 (*)     Eos # 0.24      Baso # 0.07      Imm Gran # 0.06 (*)     Imm Grans % 0.6 (*)     NRBC% 0.0     DRUG SCREEN, URINE (BEAKER) - Normal    Amphetamines, Urine Negative      Barbiturates, Urine Negative      Benzodiazepine, Urine Negative      Cannabinoids, Urine Negative      Cocaine, Urine Negative      Opiates, Urine Negative      Phencyclidine, Urine Negative      Methadone, Urine Negative      pH, Urine 7.0      Narrative:     Cut off concentrations:    Amphetamines - 1000 ng/ml  Barbiturates - 200 ng/ml  Benzodiazepine - 200 ng/ml  Cannabinoids (THC) - 50  ng/ml  Cocaine - 300 ng/ml  Fentanyl - 1.0 ng/ml  MDMA - 500 ng/ml  Opiates - 300 ng/ml   Phencyclidine (PCP) - 25 ng/ml  Methadone - 300 ng/ml      False negatives may result form substances such as bleach added to urine.  False positives may result for the presence of a substance with similar chemical structure to the drug or its metabolite.    This test provides only a PRELIMINARY analytical test result. A more specific alternate chemical method must be used in order to obtain a confirmed analytical result. Gas chromatography/mass spectrometry (GC/MS) is the preferred confirmatory method. Other chemical confirmation methods are available. Clinical consideration and professional judgement should be applied to any drug of abuse test result, particularly when preliminary positive results are used.    Positive results will be confirmed only at the physicians request. Unconfirmed screening results are to be used only for medical purposes (treatment).          LIPASE - Normal    Lipase Level 51     TROPONIN I - Normal    Troponin-I <0.012     NT-PRO NATRIURETIC PEPTIDE - Normal    ProBNP <20.0     CBC W/ AUTO DIFFERENTIAL    Narrative:     The following orders were created for panel order CBC W/ AUTO DIFFERENTIAL.  Procedure                               Abnormality         Status                     ---------                               -----------         ------                     CBC with Differential[[id number]]       Abnormal            Final result                 Please view results for these tests on the individual orders.        ECG Results              EKG 12-lead (Preliminary result)  Result time 05/12/25 01:10:53      Wet Read by Carson Godinez MD (05/12/25 01:10:53, [facility], Emergency Medicine)    Normal sinus rhythm, heart rate 71, normal intervals, normal axis, normal P waves, normal QRS, normal ST segments, normal T-waves, normal QT.                                   Imaging Results              X-Ray Chest PA And Lateral (Preliminary result)  Result time 05/12/25 01:56:50      Wet Read by Carson Godinez MD (05/12/25 01:56:50, [facility], Emergency Medicine)    Normal cardiac silhouette, clear lung fields                                     Medications   sodium chloride 0.9% bolus 1,000 mL 1,000 mL (has no administration in time range)   potassium chloride SA CR tablet 40 mEq (has no administration in time range)   albuterol-ipratropium 2.5 mg-0.5 mg/3 mL nebulizer solution 3 mL (has no administration in time range)   benzonatate capsule 200 mg (has no administration in time range)   dexAMETHasone injection 12 mg (12 mg Intramuscular Given 5/12/25 0131)     Medical Decision Making  Acute bronchitis  Differential diagnosis:  Bronchitis, COPD exacerbation, ACS, CHF, pneumonia  Decadron  Chest x-ray, labs, EKG    Amount and/or Complexity of Data Reviewed  Labs: ordered.  Radiology: ordered and independent interpretation performed.    Risk  Prescription drug management.                                      Clinical Impression:  Final diagnoses:  [R05.9] Cough  [R06.00] Dyspnea  [J40] Bronchitis (Primary)          ED Disposition Condition    Discharge Good          ED Prescriptions       Medication Sig Dispense Start Date End Date Auth. Provider    albuterol (VENTOLIN HFA) 90 mcg/actuation inhaler Inhale 2 puffs into the lungs every 6 (six) hours as needed for Wheezing. Rescue 18 g 5/12/2025 5/12/2026 Carson Godinez MD    albuterol (ACCUNEB) 0.63 mg/3 mL Nebu Take 3 mLs (0.63 mg total) by nebulization every 6 (six) hours as needed. Rescue 75 mL 5/12/2025 5/12/2026 Carson Godinez MD    benzonatate (TESSALON) 100 MG capsule Take 2 capsules (200 mg total) by mouth 3 (three) times daily as needed for Cough. 20 capsule 5/12/2025 5/22/2025 Carson Godinez MD          Follow-up Information       Follow up With Specialties Details Why Contact  "Info    Margarita Richards, FNP-C Family Medicine Call today  [address] [facility]  [address]  [place] [address]  [phone]                   [1]   Social History  Tobacco Use    Smoking status: Every Day     Current packs/day: 0.50     Average packs/day: 0.5 packs/day for 22.4 years (11.2 ttl pk-yrs)     Types: Cigarettes     Start date: 2003     Passive exposure: Current    Smokeless tobacco: Never   Substance Use Topics    Alcohol use: Not Currently    Drug use: Not Currently     Types: "Crack" cocaine, Hydrocodone, Marijuana, Methamphetamines     Comment: 5 years clean        Carson Godinez MD  05/12/25 0217    "

## 2025-05-12 NOTE — PROGRESS NOTES
"Patient ID: Cynthianellie Raymond  : 1970     Chief Complaint: Hospital Follow Up ("I have a headache and short of breath")    Allergies: Patient is allergic to tramadol.     History of Present Illness:  The patient is a 54 y.o. White female who presents to clinic for evaluation and management with a chief complaint of Hospital Follow Up ("I have a headache and short of breath")   Patient reports waking up yesterday with a headache. She reports the day prior, she began with cough and then rib pain. She denies fever but admits feeling achy all over and very tired. Went to ER last night. Was given a steroid injection and prescribed breathing treatments. Reports she has not done a breathing treatment yet because has not made it to the pharmacy.     Cough  This is a new problem. The current episode started in the past 7 days. The problem has been waxing and waning. The problem occurs every few minutes. The cough is Non-productive. Associated symptoms include chills, headaches, myalgias and shortness of breath. Pertinent negatives include no chest pain, ear congestion, ear pain, fever, heartburn, hemoptysis, nasal congestion, postnasal drip, rash, rhinorrhea, sore throat, sweats, weight loss or wheezing. She has tried rest and a beta-agonist inhaler for the symptoms. The treatment provided moderate relief. Her past medical history is significant for bronchitis.        Past Medical History:  has a past medical history of Anxiety disorder, unspecified, GERD (gastroesophageal reflux disease), Hypercholesteremia, Hypertension, MISAEL (iron deficiency anemia), Migraine headache, Nontraumatic rupture of tendon of left thumb, PID (pelvic inflammatory disease), RLS (restless legs syndrome), STD (sexually transmitted disease), Substance abuse, and Tobacco use.    Social History:  reports that she has been smoking cigarettes. She started smoking about 22 years ago. She has a 11.2 pack-year smoking history. She has been exposed " "to tobacco smoke. She has never used smokeless tobacco. She reports that she does not currently use alcohol. She reports that she does not currently use drugs after having used the following drugs: "Crack" cocaine, Hydrocodone, Marijuana, and Methamphetamines.    Care Team: Patient Care Team:  Margarita Richards FNP-C as PCP - General (Family Medicine)  Evan Mahajan, SUMA as Consulting Physician (Optometry)  Parisa Mendoza NP as Nurse Practitioner (Obstetrics and Gynecology)  Hugh Quispe MD as Consulting Physician (Cardiology)  Yesenia Reyes MD (Obstetrics)  Vinay Vivas MD (Orthopedic Surgery)     Current Medications:  Current Outpatient Medications   Medication Instructions    albuterol (ACCUNEB) 0.63 mg, Nebulization, Every 6 hours PRN, Rescue    albuterol (PROVENTIL/VENTOLIN HFA) 90 mcg/actuation inhaler 2 puffs, Inhalation, Every 6 hours PRN    albuterol (VENTOLIN HFA) 90 mcg/actuation inhaler 2 puffs, Inhalation, Every 6 hours PRN, Rescue    atorvastatin (LIPITOR) 40 mg, Oral, Daily    benzonatate (TESSALON) 200 mg, Oral, 3 times daily PRN    busPIRone (BUSPAR) 15 mg, Oral, 3 times daily    DULoxetine (CYMBALTA) 60 mg, Oral, Daily, Take 1 capsule by mouth once daily.    ferrous sulfate (FEOSOL) 325 mg, Oral, Every other day    gabapentin (NEURONTIN) 600 MG tablet Take 1 cap PO TID; take with 300 mg dose (total 900 mg TID)    hydroCHLOROthiazide 12.5 mg, Oral, Daily    HYDROcodone-acetaminophen (NORCO) 5-325 mg per tablet 1 tablet, Every 6 hours PRN    ibuprofen (ADVIL,MOTRIN) 800 mg, Oral, 3 times daily    meclizine (ANTIVERT) 25 mg, 3 times daily PRN    mupirocin (BACTROBAN) 2 % ointment Topical (Top), 3 times daily    nicotine polacrilex 4 MG Lozg Weeks 1-6: 1 lozenge q1-2hr, Weeks 7-9: 1 lozenge q2-4hr, Weeks 10-12: 1 lozenge q4-8hr. Do not exceed >5 lozenges/6hr. Do not exceed >20 lozenges/day    omeprazole (PRILOSEC) 20 mg, Oral, 2 times daily    phentermine (ADIPEX-P) 37.5 mg, " Oral, Before breakfast    sulfamethoxazole-trimethoprim 800-160mg (BACTRIM DS) 800-160 mg Tab 1 tablet, 2 times daily    sumatriptan (IMITREX) 25 mg, Oral, Daily PRN, Take 1 tablet by mouth daily as needed. May repeat dose after 2 hours up to a maximum of 200mg in 24 hours    tiZANidine (ZANAFLEX) 4 MG tablet TAKE 1 TABLET (4 MG TOTAL) BY MOUTH 2 (TWO) TIMES DAILY AS NEEDED FOR MUSCLE SPASMS *MAY CAUSE DROWSINESS*    topiramate (TOPAMAX) 50 MG tablet Week 1: Take 1/2 tablet in the morning. Week 2: Take 1/2 tablet in the morning and in the evening. Week 3: Take 1 tablet in the morning and 1/2 tablet in the evening. Week 4: 1 tablet in the morning and in the evening.       Review of Systems   Constitutional:  Positive for chills. Negative for fever and weight loss.   HENT:  Negative for ear pain, postnasal drip, rhinorrhea and sore throat.    Respiratory:  Positive for cough and shortness of breath. Negative for hemoptysis and wheezing.    Cardiovascular:  Negative for chest pain.   Gastrointestinal:  Negative for heartburn.   Musculoskeletal:  Positive for myalgias.   Integumentary:  Negative for rash.   Neurological:  Positive for headaches.        Visit Vitals  /80 (Patient Position: Sitting)   Pulse 109   Temp 97.9 °F (36.6 °C) (Temporal)   LMP 04/22/2024   SpO2 97%       Physical Exam  Vitals reviewed.   Constitutional:       General: She is not in acute distress.     Appearance: Normal appearance. She is normal weight. She is not ill-appearing.   HENT:      Right Ear: Tympanic membrane, ear canal and external ear normal.      Left Ear: Tympanic membrane, ear canal and external ear normal.      Nose: Nose normal.      Mouth/Throat:      Mouth: Mucous membranes are moist.      Pharynx: Oropharynx is clear.   Cardiovascular:      Rate and Rhythm: Normal rate and regular rhythm.      Pulses: Normal pulses.      Heart sounds: Normal heart sounds.   Pulmonary:      Effort: Pulmonary effort is normal.      Breath  sounds: Normal breath sounds. No wheezing.   Skin:     General: Skin is warm and dry.   Neurological:      Mental Status: She is alert and oriented to person, place, and time.   Psychiatric:         Mood and Affect: Mood normal.         Behavior: Behavior normal.          Labs Reviewed:  Chemistry:  Lab Results   Component Value Date     05/12/2025    K 3.1 (L) 05/12/2025    BUN 22 (H) 05/12/2025    CREATININE 1.30 (H) 05/12/2025    EGFRNORACEVR 49 05/12/2025    CALCIUM 9.3 05/12/2025    ALKPHOS 122 05/12/2025    ALBUMIN 4.3 05/12/2025    AST 29 05/12/2025    ALT 37 05/12/2025    TSH 1.920 04/23/2025        Lab Results   Component Value Date    HGBA1C 6.1 (H) 04/23/2025        Hematology:  Lab Results   Component Value Date    WBC 9.99 05/12/2025    RBC 4.97 05/12/2025    HGB 14.7 05/12/2025    HCT 43.3 05/12/2025    MCV 87.1 05/12/2025    MCH 29.6 05/12/2025    MCHC 33.9 05/12/2025    RDW 14.0 05/12/2025     (H) 05/12/2025    MPV 9.4 05/12/2025       Lipid Panel:  Lab Results   Component Value Date    CHOL 248 (H) 04/23/2025    HDL 63 (H) 04/23/2025    LDLDIRECT 164.8 (H) 04/23/2025    TRIG 142 04/23/2025        Assessment & Plan:  1. Bronchitis  Comments:  (-) flu and covid. cxr (-), wbc wnl. encourage rest, hydration, symptom management, picking up prescriptions for inhalers/nebs    2. Tobacco user  Overview:  Down to 1/4 ppd. Wellbutrin ineffective. Offered alternatives to aid with smoking cessation. Begin lozenges per patient request.    Orders:  -     nicotine polacrilex 4 MG Lozg; Weeks 1-6: 1 lozenge q1-2hr, Weeks 7-9: 1 lozenge q2-4hr, Weeks 10-12: 1 lozenge q4-8hr. Do not exceed >5 lozenges/6hr. Do not exceed >20 lozenges/day  Dispense: 270 lozenge; Refill: 0         Future Appointments   Date Time Provider Department Center   6/3/2025  3:00 PM Margarita Richards FNP-C [facility] JOHN Dinh [facility]   4/29/2026  8:20 AM LAB, [facility] LABORATORY DRAW STATION [facility] SAHRA Dinh [facility]   5/5/2026  3:00 PM  Margarita Richards, FNP-C [facility] JOHN Hussein       Follow up for work excuse for today and tomorrow. Call sooner if needed.    TRINIDAD CHARLES        Lab Frequency Next Occurrence   Ambulatory referral/consult to Gynecologic Oncology Once 05/28/2024   Ambulatory referral/consult to ENT Once 12/09/2024   Mammo Digital Screening Bilat w/ Bigg (XPD) Once 03/31/2025   Ambulatory referral/consult to [facility] COLONOSCOPY Once 05/08/2025   CBC Auto Differential Once 05/01/2026   Comprehensive Metabolic Panel Once 05/01/2026   Lipid Panel Once 05/01/2026   TSH Once 05/01/2026   Hemoglobin A1C Once 05/01/2026   Vitamin D Once 05/01/2026

## 2025-06-01 DIAGNOSIS — F33.41 RECURRENT MAJOR DEPRESSIVE DISORDER, IN PARTIAL REMISSION: ICD-10-CM

## 2025-06-01 DIAGNOSIS — F41.1 GAD (GENERALIZED ANXIETY DISORDER): ICD-10-CM

## 2025-06-01 DIAGNOSIS — M51.16 LUMBAR DISC DISEASE WITH RADICULOPATHY: ICD-10-CM

## 2025-06-02 RX ORDER — TIZANIDINE 4 MG/1
4 TABLET ORAL EVERY 8 HOURS
Qty: 60 TABLET | Refills: 0 | Status: SHIPPED | OUTPATIENT
Start: 2025-06-02

## 2025-06-02 RX ORDER — DULOXETIN HYDROCHLORIDE 60 MG/1
60 CAPSULE, DELAYED RELEASE ORAL DAILY
Qty: 30 CAPSULE | Refills: 11 | Status: SHIPPED | OUTPATIENT
Start: 2025-06-02

## 2025-06-06 ENCOUNTER — OFFICE VISIT (OUTPATIENT)
Dept: FAMILY MEDICINE | Facility: CLINIC | Age: 55
End: 2025-06-06
Payer: MEDICAID

## 2025-06-06 VITALS
DIASTOLIC BLOOD PRESSURE: 70 MMHG | TEMPERATURE: 98 F | SYSTOLIC BLOOD PRESSURE: 122 MMHG | OXYGEN SATURATION: 98 % | HEART RATE: 80 BPM | BODY MASS INDEX: 37.59 KG/M2 | WEIGHT: 220.19 LBS | HEIGHT: 64 IN

## 2025-06-06 DIAGNOSIS — S39.011A STRAIN OF ABDOMINAL MUSCLE, INITIAL ENCOUNTER: ICD-10-CM

## 2025-06-06 DIAGNOSIS — E66.01 CLASS 2 SEVERE OBESITY DUE TO EXCESS CALORIES WITH SERIOUS COMORBIDITY AND BODY MASS INDEX (BMI) OF 37.0 TO 37.9 IN ADULT: Primary | ICD-10-CM

## 2025-06-06 DIAGNOSIS — E66.812 CLASS 2 SEVERE OBESITY DUE TO EXCESS CALORIES WITH SERIOUS COMORBIDITY AND BODY MASS INDEX (BMI) OF 37.0 TO 37.9 IN ADULT: Primary | ICD-10-CM

## 2025-06-06 RX ORDER — KETOROLAC TROMETHAMINE 10 MG/1
10 TABLET, FILM COATED ORAL EVERY 6 HOURS PRN
COMMUNITY
Start: 2025-06-02

## 2025-07-03 DIAGNOSIS — I10 PRIMARY HYPERTENSION: ICD-10-CM

## 2025-07-03 DIAGNOSIS — M51.16 LUMBAR DISC DISEASE WITH RADICULOPATHY: ICD-10-CM

## 2025-07-03 RX ORDER — TIZANIDINE 4 MG/1
TABLET ORAL
Qty: 60 TABLET | Refills: 0 | Status: SHIPPED | OUTPATIENT
Start: 2025-07-03

## 2025-07-03 RX ORDER — HYDROCHLOROTHIAZIDE 12.5 MG/1
12.5 TABLET ORAL DAILY
Qty: 30 TABLET | Refills: 11 | Status: SHIPPED | OUTPATIENT
Start: 2025-07-03

## 2025-07-10 DIAGNOSIS — C54.1 ENDOMETRIAL SARCOMA: Primary | ICD-10-CM

## 2025-07-21 ENCOUNTER — HOSPITAL ENCOUNTER (OUTPATIENT)
Dept: RADIOLOGY | Facility: HOSPITAL | Age: 55
Discharge: HOME | End: 2025-07-21
Attending: STUDENT IN AN ORGANIZED HEALTH CARE EDUCATION/TRAINING PROGRAM
Payer: MEDICAID

## 2025-07-21 DIAGNOSIS — C54.1 ENDOMETRIAL SARCOMA: ICD-10-CM

## 2025-07-21 PROCEDURE — 25500020 PHARM REV CODE 255

## 2025-07-21 PROCEDURE — A9698 NON-RAD CONTRAST MATERIALNOC: HCPCS

## 2025-07-21 PROCEDURE — 74178 CT ABD&PLV WO CNTR FLWD CNTR: CPT | Mod: TC

## 2025-07-21 RX ADMIN — IOHEXOL 100 ML: 300 INJECTION, SOLUTION INTRAVENOUS at 11:07

## 2025-07-21 RX ADMIN — IOHEXOL 1000 ML: 9 SOLUTION ORAL at 11:07

## 2025-07-29 DIAGNOSIS — M51.16 LUMBAR DISC DISEASE WITH RADICULOPATHY: ICD-10-CM

## 2025-07-29 DIAGNOSIS — F41.1 GAD (GENERALIZED ANXIETY DISORDER): ICD-10-CM

## 2025-07-30 RX ORDER — BUSPIRONE HYDROCHLORIDE 15 MG/1
15 TABLET ORAL 3 TIMES DAILY
Qty: 90 TABLET | Refills: 11 | Status: SHIPPED | OUTPATIENT
Start: 2025-07-30

## 2025-07-30 RX ORDER — TIZANIDINE 4 MG/1
4 TABLET ORAL EVERY 8 HOURS PRN
Qty: 60 TABLET | Refills: 1 | Status: SHIPPED | OUTPATIENT
Start: 2025-07-30

## 2025-08-01 ENCOUNTER — PATIENT MESSAGE (OUTPATIENT)
Dept: FAMILY MEDICINE | Facility: CLINIC | Age: 55
End: 2025-08-01
Payer: MEDICAID

## 2025-08-01 DIAGNOSIS — K21.9 GASTROESOPHAGEAL REFLUX DISEASE WITHOUT ESOPHAGITIS: ICD-10-CM

## 2025-08-04 ENCOUNTER — HOSPITAL ENCOUNTER (OUTPATIENT)
Dept: RADIOLOGY | Facility: HOSPITAL | Age: 55
Discharge: HOME | End: 2025-08-04
Attending: NURSE PRACTITIONER
Payer: MEDICAID

## 2025-08-04 DIAGNOSIS — Z12.31 ENCOUNTER FOR SCREENING MAMMOGRAM FOR BREAST CANCER: ICD-10-CM

## 2025-08-04 PROCEDURE — 77067 SCR MAMMO BI INCL CAD: CPT | Mod: TC

## 2025-08-04 PROCEDURE — 77063 BREAST TOMOSYNTHESIS BI: CPT | Mod: 26,,, | Performed by: STUDENT IN AN ORGANIZED HEALTH CARE EDUCATION/TRAINING PROGRAM

## 2025-08-04 PROCEDURE — 77067 SCR MAMMO BI INCL CAD: CPT | Mod: 26,,, | Performed by: STUDENT IN AN ORGANIZED HEALTH CARE EDUCATION/TRAINING PROGRAM

## 2025-08-05 RX ORDER — OMEPRAZOLE 20 MG/1
20 CAPSULE, DELAYED RELEASE ORAL DAILY
Qty: 30 CAPSULE | Refills: 11 | Status: SHIPPED | OUTPATIENT
Start: 2025-08-05

## 2025-08-07 ENCOUNTER — PATIENT MESSAGE (OUTPATIENT)
Dept: FAMILY MEDICINE | Facility: CLINIC | Age: 55
End: 2025-08-07
Payer: MEDICAID

## 2025-08-13 ENCOUNTER — HOSPITAL ENCOUNTER (OUTPATIENT)
Dept: PREADMISSION TESTING | Facility: HOSPITAL | Age: 55
Discharge: HOME | End: 2025-08-13
Attending: FAMILY MEDICINE
Payer: MEDICAID

## 2025-08-13 VITALS — WEIGHT: 212 LBS | HEIGHT: 64 IN | BODY MASS INDEX: 36.19 KG/M2

## 2025-08-13 DIAGNOSIS — Z12.11 SCREEN FOR COLON CANCER: Primary | ICD-10-CM

## 2025-08-13 DIAGNOSIS — Z12.11 COLON CANCER SCREENING: ICD-10-CM

## 2025-08-13 RX ORDER — TIRZEPATIDE 7.5MG/.3ML
SYRINGE (ML) SUBCUTANEOUS WEEKLY
COMMUNITY

## 2025-08-13 RX ORDER — SODIUM CHLORIDE, SODIUM LACTATE, POTASSIUM CHLORIDE, CALCIUM CHLORIDE 600; 310; 30; 20 MG/100ML; MG/100ML; MG/100ML; MG/100ML
INJECTION, SOLUTION INTRAVENOUS CONTINUOUS
OUTPATIENT
Start: 2025-08-19

## 2025-08-14 DIAGNOSIS — G43.909 MIGRAINE WITHOUT STATUS MIGRAINOSUS, NOT INTRACTABLE, UNSPECIFIED MIGRAINE TYPE: ICD-10-CM

## 2025-08-14 RX ORDER — SUMATRIPTAN SUCCINATE 25 MG/1
25 TABLET ORAL DAILY PRN
Qty: 9 TABLET | Refills: 1 | Status: SHIPPED | OUTPATIENT
Start: 2025-08-14

## 2025-08-15 ENCOUNTER — ANESTHESIA EVENT (OUTPATIENT)
Dept: GASTROENTEROLOGY | Facility: HOSPITAL | Age: 55
End: 2025-08-15
Payer: MEDICAID

## 2025-08-19 ENCOUNTER — ANESTHESIA (OUTPATIENT)
Dept: GASTROENTEROLOGY | Facility: HOSPITAL | Age: 55
End: 2025-08-19
Payer: MEDICAID

## 2025-08-19 ENCOUNTER — HOSPITAL ENCOUNTER (OUTPATIENT)
Dept: GASTROENTEROLOGY | Facility: HOSPITAL | Age: 55
Discharge: HOME | End: 2025-08-19
Attending: FAMILY MEDICINE
Payer: MEDICAID

## 2025-08-19 VITALS
OXYGEN SATURATION: 99 % | WEIGHT: 212 LBS | HEIGHT: 64 IN | TEMPERATURE: 97 F | HEART RATE: 55 BPM | DIASTOLIC BLOOD PRESSURE: 60 MMHG | RESPIRATION RATE: 20 BRPM | SYSTOLIC BLOOD PRESSURE: 95 MMHG | BODY MASS INDEX: 36.19 KG/M2

## 2025-08-19 DIAGNOSIS — Z12.11 COLON CANCER SCREENING: ICD-10-CM

## 2025-08-19 DIAGNOSIS — Z12.11 SCREEN FOR COLON CANCER: ICD-10-CM

## 2025-08-19 PROCEDURE — 37000008 HC ANESTHESIA 1ST 15 MINUTES: Performed by: FAMILY MEDICINE

## 2025-08-19 PROCEDURE — 45385 COLONOSCOPY W/LESION REMOVAL: CPT | Performed by: FAMILY MEDICINE

## 2025-08-19 PROCEDURE — 37000009 HC ANESTHESIA EA ADD 15 MINS: Performed by: FAMILY MEDICINE

## 2025-08-19 PROCEDURE — 63600175 PHARM REV CODE 636 W HCPCS: Performed by: NURSE ANESTHETIST, CERTIFIED REGISTERED

## 2025-08-19 PROCEDURE — 63600175 PHARM REV CODE 636 W HCPCS: Performed by: FAMILY MEDICINE

## 2025-08-19 RX ORDER — LIDOCAINE HYDROCHLORIDE 20 MG/ML
INJECTION INTRAVENOUS
Status: DISCONTINUED | OUTPATIENT
Start: 2025-08-19 | End: 2025-08-19

## 2025-08-19 RX ORDER — PROPOFOL 10 MG/ML
VIAL (ML) INTRAVENOUS
Status: DISCONTINUED | OUTPATIENT
Start: 2025-08-19 | End: 2025-08-19

## 2025-08-19 RX ORDER — GLYCOPYRROLATE 0.2 MG/ML
INJECTION INTRAMUSCULAR; INTRAVENOUS
Status: DISCONTINUED | OUTPATIENT
Start: 2025-08-19 | End: 2025-08-19

## 2025-08-19 RX ORDER — SODIUM CHLORIDE, SODIUM LACTATE, POTASSIUM CHLORIDE, CALCIUM CHLORIDE 600; 310; 30; 20 MG/100ML; MG/100ML; MG/100ML; MG/100ML
INJECTION, SOLUTION INTRAVENOUS CONTINUOUS
Status: DISCONTINUED | OUTPATIENT
Start: 2025-08-19 | End: 2025-08-20 | Stop reason: HOSPADM

## 2025-08-19 RX ADMIN — PROPOFOL 30 MG: 10 INJECTION, EMULSION INTRAVENOUS at 10:08

## 2025-08-19 RX ADMIN — SODIUM CHLORIDE, POTASSIUM CHLORIDE, SODIUM LACTATE AND CALCIUM CHLORIDE: 600; 310; 30; 20 INJECTION, SOLUTION INTRAVENOUS at 09:08

## 2025-08-19 RX ADMIN — LIDOCAINE HYDROCHLORIDE 50 MG: 20 INJECTION, SOLUTION INTRAVENOUS at 10:08

## 2025-08-19 RX ADMIN — PROPOFOL 70 MG: 10 INJECTION, EMULSION INTRAVENOUS at 10:08

## 2025-08-19 RX ADMIN — GLYCOPYRROLATE 0.2 MG: 0.2 INJECTION INTRAMUSCULAR; INTRAVENOUS at 10:08

## 2025-08-21 LAB — BEAKER SEE SCANNED REPORT: NORMAL

## 2025-08-26 ENCOUNTER — TELEPHONE (OUTPATIENT)
Dept: FAMILY MEDICINE | Facility: CLINIC | Age: 55
End: 2025-08-26
Payer: MEDICAID

## 2025-08-29 DIAGNOSIS — M51.16 LUMBAR DISC DISEASE WITH RADICULOPATHY: ICD-10-CM

## 2025-08-29 RX ORDER — GABAPENTIN 600 MG/1
TABLET ORAL
Qty: 90 TABLET | Refills: 11 | Status: SHIPPED | OUTPATIENT
Start: 2025-08-29

## 2025-09-02 ENCOUNTER — PATIENT MESSAGE (OUTPATIENT)
Dept: FAMILY MEDICINE | Facility: CLINIC | Age: 55
End: 2025-09-02
Payer: MEDICAID